# Patient Record
Sex: FEMALE | Race: WHITE | NOT HISPANIC OR LATINO | Employment: OTHER | URBAN - METROPOLITAN AREA
[De-identification: names, ages, dates, MRNs, and addresses within clinical notes are randomized per-mention and may not be internally consistent; named-entity substitution may affect disease eponyms.]

---

## 2017-02-06 NOTE — PROGRESS NOTES
SUBJECTIVE:                                                    Becky Lay is a 56 year old female who presents to clinic today for the following health issues    Gastrointestinal symptoms      Duration: one month on and off    Description:           Nausea only    Intensity:  mild    Accompanying signs and symptoms:  none    History  Previous {similar problem: no   Previous evaluation:  none    Aggravating factors: none    Alleviating factors: antacids x 2 only-helped but doesn't remember how long    Other Therapies tried: None     She notes about 2 mo ago, she had tooth pain and she has a hx of clenching. She started using NSAIDs and splint, and these symptoms improved, but she had worsening nausea. She saw her endodontist and she was dx'ed with infection and suggested tooth removal and abx. She had worsening nausea after 2-3 days and her endodontist switched to azithromycin. Tooth was then extracted. Since then, she noes her nausea would come and go and she had a follow up appt with endodontist yesterday who said her tooth and gums are healing nicely and didn't think it was related.     She notes her appetitive has changed, she stopped drinking coffee, chocolate or alcohol. Her symptoms are only nausea, no vomiting diarrhea or constipation or abdominal pain of any kind. No changes in diet. She does have mild dizziness when she has this. No hx of allergies.     Wt Readings from Last 4 Encounters:   07/27/16 125 lb 12.8 oz (57.063 kg)   07/19/16 125 lb 12.8 oz (57.063 kg)   12/14/15 130 lb (58.968 kg)   03/07/15 130 lb (58.968 kg)     -------------------------------------    Problem list and histories reviewed & adjusted, as indicated.  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, 6, 7 DJD     Other and unspecified malignant neoplasm of skin of other and unspecified parts of face     Generalized hyperhidrosis     CARDIOVASCULAR SCREENING; LDL GOAL LESS THAN 160      Menopausal syndrome (hot flashes)     Cervicalgia     Past Surgical History   Procedure Laterality Date     C arvizu w/o facetec foramot/dskc 1/2 vrt seg, cervical  2004     anterior c- 5, 6 fusion       Social History   Substance Use Topics     Smoking status: Never Smoker      Smokeless tobacco: Never Used     Alcohol Use: 6.0 oz/week      Comment: one glass per week     Family History   Problem Relation Age of Onset     OSTEOPOROSIS Mother      Lipids Mother      HEART DISEASE Father      MI age 50     HEART DISEASE Father      BP and Chol Meds     CANCER Maternal Grandmother      colon     CANCER Maternal Grandfather      lung ca           ROS:  Constitutional, HEENT, cardiovascular, pulmonary, GI, , musculoskeletal, neuro, skin, endocrine and psych systems are negative, except as otherwise noted.    OBJECTIVE:                                                    BP 98/76 mmHg  Pulse 72  Temp(Src) 98.2  F (36.8  C) (Tympanic)  Resp 16  Wt   LMP 06/20/2008  There is no weight on file to calculate BMI.  GENERAL: healthy, alert and no distress  EYES: Eyes grossly normal to inspection, PERRL and conjunctivae and sclerae normal  HENT: ear canals and TM's normal, nose and mouth without ulcers or lesions  NECK: no adenopathy, no asymmetry, masses, or scars and thyroid normal to palpation  RESP: lungs clear to auscultation - no rales, rhonchi or wheezes  CV: regular rate and rhythm, normal S1 S2, no S3 or S4, no murmur, click or rub, no peripheral edema and peripheral pulses strong  SKIN: no suspicious lesions or rashes  PSYCH: mentation appears normal, affect normal/bright         ASSESSMENT/PLAN:                                                      1. Nausea  We reviewed her symptoms in depth today. Her nausea is not accompanied with any other abd pain or symptoms, indicative of GI type phenomenon including h pylori, infection, GERD. She has had minimal dizziness, no fluid observed behind ears today. She does admit  "anxiety could be a factor, stating \"tooth problems\" is a particular fear for her. Will draw labs, could try atarax and monitor. She notes since hearing from her oral surgeon that htings look fine yesterday, she does feel better. These symptoms could resolve on their own, however if she does not see this improvement, she should follow up with me in clinic    - TSH with free T4 reflex  - CBC with platelets differential  - Comprehensive metabolic panel  - Vitamin B6  - Vitamin B12  - Vitamin D Deficiency  - hydrOXYzine (ATARAX) 25 MG tablet; Take 1-2 tablets (25-50 mg) by mouth every 6 hours as needed for anxiety or other  Dispense: 30 tablet; Refill: 0    Patient Instructions   We'll draw labs today to rule out some other potential diagnoses. It may go away with time. You could try atarax when you get this symptoms. Keep an eye on when you've eaten last when you get these symptoms.         NANCY Soriano [facility] [place]    "

## 2017-02-07 ENCOUNTER — OFFICE VISIT (OUTPATIENT)
Dept: PEDIATRICS | Facility: CLINIC | Age: 57
End: 2017-02-07
Payer: COMMERCIAL

## 2017-02-07 VITALS
HEART RATE: 72 BPM | SYSTOLIC BLOOD PRESSURE: 98 MMHG | RESPIRATION RATE: 16 BRPM | DIASTOLIC BLOOD PRESSURE: 76 MMHG | TEMPERATURE: 98.2 F

## 2017-02-07 DIAGNOSIS — R11.0 NAUSEA: Primary | ICD-10-CM

## 2017-02-07 LAB
BASOPHILS # BLD AUTO: 0 10E9/L (ref 0–0.2)
BASOPHILS NFR BLD AUTO: 0.9 %
DIFFERENTIAL METHOD BLD: NORMAL
EOSINOPHIL # BLD AUTO: 0.1 10E9/L (ref 0–0.7)
EOSINOPHIL NFR BLD AUTO: 2 %
ERYTHROCYTE [DISTWIDTH] IN BLOOD BY AUTOMATED COUNT: 12.5 % (ref 10–15)
HCT VFR BLD AUTO: 36.9 % (ref 35–47)
HGB BLD-MCNC: 12 G/DL (ref 11.7–15.7)
LYMPHOCYTES # BLD AUTO: 1.3 10E9/L (ref 0.8–5.3)
LYMPHOCYTES NFR BLD AUTO: 29.5 %
MCH RBC QN AUTO: 28.1 PG (ref 26.5–33)
MCHC RBC AUTO-ENTMCNC: 32.5 G/DL (ref 31.5–36.5)
MCV RBC AUTO: 86 FL (ref 78–100)
MONOCYTES # BLD AUTO: 0.3 10E9/L (ref 0–1.3)
MONOCYTES NFR BLD AUTO: 7.5 %
NEUTROPHILS # BLD AUTO: 2.6 10E9/L (ref 1.6–8.3)
NEUTROPHILS NFR BLD AUTO: 60.1 %
PLATELET # BLD AUTO: 187 10E9/L (ref 150–450)
RBC # BLD AUTO: 4.27 10E12/L (ref 3.8–5.2)
VIT B12 SERPL-MCNC: 464 PG/ML (ref 193–986)
WBC # BLD AUTO: 4.4 10E9/L (ref 4–11)

## 2017-02-07 PROCEDURE — 99000 SPECIMEN HANDLING OFFICE-LAB: CPT | Performed by: NURSE PRACTITIONER

## 2017-02-07 PROCEDURE — 82607 VITAMIN B-12: CPT | Performed by: NURSE PRACTITIONER

## 2017-02-07 PROCEDURE — 82306 VITAMIN D 25 HYDROXY: CPT | Performed by: NURSE PRACTITIONER

## 2017-02-07 PROCEDURE — 80050 GENERAL HEALTH PANEL: CPT | Performed by: NURSE PRACTITIONER

## 2017-02-07 PROCEDURE — 84207 ASSAY OF VITAMIN B-6: CPT | Mod: 90 | Performed by: NURSE PRACTITIONER

## 2017-02-07 PROCEDURE — 99214 OFFICE O/P EST MOD 30 MIN: CPT | Performed by: NURSE PRACTITIONER

## 2017-02-07 PROCEDURE — 36415 COLL VENOUS BLD VENIPUNCTURE: CPT | Performed by: NURSE PRACTITIONER

## 2017-02-07 RX ORDER — HYDROXYZINE HYDROCHLORIDE 25 MG/1
25-50 TABLET, FILM COATED ORAL EVERY 6 HOURS PRN
Qty: 30 TABLET | Refills: 0 | Status: SHIPPED | OUTPATIENT
Start: 2017-02-07 | End: 2018-03-16

## 2017-02-07 NOTE — NURSING NOTE
"Chief Complaint   Patient presents with     Nausea       Initial BP 98/76 mmHg  Pulse 72  Temp(Src) 98.2  F (36.8  C) (Tympanic)  Resp 16  Wt   LMP 06/20/2008 Estimated body mass index is 20.93 kg/(m^2) as calculated from the following:    Height as of 7/27/16: 5' 5\" (1.651 m).    Weight as of 7/27/16: 125 lb 12.8 oz (57.063 kg).  Medication Reconciliation: complete    "

## 2017-02-07 NOTE — PATIENT INSTRUCTIONS
We'll draw labs today to rule out some other potential diagnoses. It may go away with time. You could try atarax when you get this symptoms. Keep an eye on when you've eaten last when you get these symptoms.

## 2017-02-07 NOTE — MR AVS SNAPSHOT
After Visit Summary   2/7/2017    Becky Lay    MRN: [id number]           Patient Information     Date Of Birth          1960        Visit Information        Provider Department      2/7/2017 9:45 AM Paty Thomson APRN CNP [facility]        Today's Diagnoses     Nausea    -  1       Care Instructions    We'll draw labs today to rule out some other potential diagnoses. It may go away with time. You could try atarax when you get this symptoms. Keep an eye on when you've eaten last when you get these symptoms.         Follow-ups after your visit        Who to contact     If you have questions or need follow up information about today's clinic visit or your schedule please contact [facility] directly at [phone].  Normal or non-critical lab and imaging results will be communicated to you by MyChart, letter or phone within 4 business days after the clinic has received the results. If you do not hear from us within 7 days, please contact the clinic through MyChart or phone. If you have a critical or abnormal lab result, we will notify you by phone as soon as possible.  Submit refill requests through [employer] or call your pharmacy and they will forward the refill request to us. Please allow 3 business days for your refill to be completed.          Additional Information About Your Visit        MyChart Information     [employer] gives you secure access to your electronic health record. If you see a primary care provider, you can also send messages to your care team and make appointments. If you have questions, please call your primary care clinic.  If you do not have a primary care provider, please call [phone] and they will assist you.        Care EveryWhere ID     This is your Care EveryWhere ID. This could be used by other organizations to access your [place] medical records  [id number]        Your Vitals Were     Pulse Temperature Respirations Last  Period          72 98.2  F (36.8  C) (Tympanic) 16 06/20/2008         Blood Pressure from Last 3 Encounters:   02/07/17 98/76   07/27/16 106/72   07/19/16 94/60    Weight from Last 3 Encounters:   07/27/16 125 lb 12.8 oz (57.063 kg)   07/19/16 125 lb 12.8 oz (57.063 kg)   12/14/15 130 lb (58.968 kg)              We Performed the Following     CBC with platelets differential     Comprehensive metabolic panel     TSH with free T4 reflex     Vitamin B12     Vitamin B6     Vitamin D Deficiency          Today's Medication Changes          These changes are accurate as of: 2/7/17 10:22 AM.  If you have any questions, ask your nurse or doctor.               Start taking these medicines.        Dose/Directions    hydrOXYzine 25 MG tablet   Commonly known as:  ATARAX   Used for:  Nausea   Started by:  Paty Thomson APRN CNP        Dose:  25-50 mg   Take 1-2 tablets (25-50 mg) by mouth every 6 hours as needed for anxiety or other   Quantity:  30 tablet   Refills:  0            Where to get your medicines      These medications were sent to [id number] - [place] - [address] AT [facility]  [address] [address]     Phone:  [phone]    - hydrOXYzine 25 MG tablet             Primary Care Provider Office Phone # Fax #    NANCY Ramsay [phone]       [address] [place] [address]        Thank you!     Thank you for choosing [facility]  for your care. Our goal is always to provide you with excellent care. Hearing back from our patients is one way we can continue to improve our services. Please take a few minutes to complete the written survey that you may receive in the mail after your visit with us. Thank you!             Your Updated Medication List - Protect others around you: Learn how to safely use, store and throw away your medicines at www.disposemymeds.org.          This list is accurate as  of: 2/7/17 10:22 AM.  Always use your most recent med list.                   Brand Name Dispense Instructions for use    CITRACAL/VITAMIN D PO      2 a day       cyclobenzaprine 10 MG tablet    FLEXERIL    90 tablet    Take 1 tablet (10 mg) by mouth 2 times daily as needed for muscle spasms       hydrOXYzine 25 MG tablet    ATARAX    30 tablet    Take 1-2 tablets (25-50 mg) by mouth every 6 hours as needed for anxiety or other

## 2017-02-08 ENCOUNTER — HOSPITAL ENCOUNTER (OUTPATIENT)
Dept: MAMMOGRAPHY | Facility: CLINIC | Age: 57
Discharge: HOME | End: 2017-02-08
Attending: NURSE PRACTITIONER | Admitting: NURSE PRACTITIONER
Payer: COMMERCIAL

## 2017-02-08 DIAGNOSIS — Z00.00 ROUTINE GENERAL MEDICAL EXAMINATION AT A HEALTH CARE FACILITY: ICD-10-CM

## 2017-02-08 LAB
ALBUMIN SERPL-MCNC: 4.4 G/DL (ref 3.4–5)
ALP SERPL-CCNC: 55 U/L (ref 40–150)
ALT SERPL W P-5'-P-CCNC: 33 U/L (ref 0–50)
ANION GAP SERPL CALCULATED.3IONS-SCNC: 7 MMOL/L (ref 3–14)
AST SERPL W P-5'-P-CCNC: 18 U/L (ref 0–45)
BILIRUB SERPL-MCNC: 0.5 MG/DL (ref 0.2–1.3)
BUN SERPL-MCNC: 11 MG/DL (ref 7–30)
CALCIUM SERPL-MCNC: 8.9 MG/DL (ref 8.5–10.1)
CHLORIDE SERPL-SCNC: 105 MMOL/L (ref 94–109)
CO2 SERPL-SCNC: 27 MMOL/L (ref 20–32)
CREAT SERPL-MCNC: 0.78 MG/DL (ref 0.52–1.04)
DEPRECATED CALCIDIOL+CALCIFEROL SERPL-MC: 27 UG/L (ref 20–75)
GFR SERPL CREATININE-BSD FRML MDRD: 75 ML/MIN/1.7M2
GLUCOSE SERPL-MCNC: 82 MG/DL (ref 70–99)
POTASSIUM SERPL-SCNC: 4 MMOL/L (ref 3.4–5.3)
PROT SERPL-MCNC: 7.1 G/DL (ref 6.8–8.8)
SODIUM SERPL-SCNC: 139 MMOL/L (ref 133–144)
TSH SERPL DL<=0.005 MIU/L-ACNC: 1.8 MU/L (ref 0.4–4)

## 2017-02-08 PROCEDURE — G0202 SCR MAMMO BI INCL CAD: HCPCS

## 2017-02-11 LAB — VIT B6 SERPL-MCNC: 120.4 NG/ML

## 2018-03-16 ENCOUNTER — OFFICE VISIT (OUTPATIENT)
Dept: PEDIATRICS | Facility: CLINIC | Age: 58
End: 2018-03-16
Payer: COMMERCIAL

## 2018-03-16 VITALS
HEIGHT: 65 IN | HEART RATE: 61 BPM | OXYGEN SATURATION: 98 % | TEMPERATURE: 97.3 F | DIASTOLIC BLOOD PRESSURE: 64 MMHG | SYSTOLIC BLOOD PRESSURE: 96 MMHG | WEIGHT: 131.2 LBS | BODY MASS INDEX: 21.86 KG/M2

## 2018-03-16 DIAGNOSIS — M54.50 ACUTE BILATERAL LOW BACK PAIN WITHOUT SCIATICA: Primary | ICD-10-CM

## 2018-03-16 PROCEDURE — 99213 OFFICE O/P EST LOW 20 MIN: CPT | Performed by: INTERNAL MEDICINE

## 2018-03-16 RX ORDER — CYCLOBENZAPRINE HCL 10 MG
10 TABLET ORAL 3 TIMES DAILY PRN
Qty: 45 TABLET | Refills: 1 | Status: SHIPPED | OUTPATIENT
Start: 2018-03-16 | End: 2019-09-17

## 2018-03-16 NOTE — MR AVS SNAPSHOT
After Visit Summary   3/16/2018    Becky Lay    MRN: [id number]           Patient Information     Date Of Birth          1960        Visit Information        Provider Department      3/16/2018 9:40 AM Lisa Guajardo MD [facility]        Today's Diagnoses     Acute bilateral low back pain without sciatica    -  1      Care Instructions    1. Ibuprofen 600 mg (3 pills) three times a day for 5-7 days, then as needed - take with food  (could take up to 800 mg three times a day)  2. Flexeril at bedtime as needed to help relax muscles  3. You will be contacted to set up the physical therapy  4. Stretches below             Low Back Pain            What is low back pain?   Low back pain is pain and stiffness in the lower back. It is one of the most common reasons people miss work.   How does it occur?   Your lower back is called your lumbar spine. It is made up of 5 bones called lumbar vertebrae. In between the vertebrae are shock absorbers called disks. Back pain can occur from an injury to the vertebrae or when a disk bulges or herniates.   Low back pain is usually caused when a ligament or muscle holding a vertebra in its proper position is strained. Vertebrae are bones that make up the spinal column through which the spinal cord passes. When these muscles or ligaments become weak or strained, the spine loses its stability, resulting in pain.   Low back pain can occur if your job involves lifting and carrying heavy objects, or if you spend a lot of time sitting or standing in one position or bending over. It can be caused by a fall or by unusually strenuous exercise. It can be brought on by the tension and stress that cause headaches in some people. It can even be brought on by violent sneezing or coughing.   People who are overweight may have low back pain because of the added stress on their back.   Back pain may occur when the muscles, joints, bones, and connective  tissues of the back become inflamed as a result of an infection or an immune system problem. Arthritic disorders as well as some congenital and degenerative conditions may cause back pain.   Back pain accompanied by loss of bladder or bowel control, trouble moving your legs, or numbness or tingling in your arms or legs requires immediate medical treatment.   What are the symptoms?   Symptoms include:   pain in the back or legs   stiffness, spasm, or limited motion   The pain may be constant or may happen only in certain positions. It may get worse when you cough, sneeze, bend, twist, or strain during a bowel movement. The pain may be in only one spot or may spread to other areas, most commonly down the buttocks and into the back of the thigh.   A low back strain typically does not produce pain past the knee into the calf or foot. Tingling or numbness in the calf or foot may indicate a herniated disk or pinched nerve.   Be sure to see your healthcare provider if:   You have weakness in your leg, especially if you cannot lift your foot, because this may be a sign of nerve damage.   You have new bowel or bladder problems as well as back pain, which may be a sign of severe injury to your spinal cord.   You have pain that gets worse despite treatment.   How is it diagnosed?   Your healthcare provider will review your medical history and examine you. You may have X-rays, an MRI, CT scan, or a bone scan.   How is it treated?   To treat this condition:   Put an ice pack, gel pack, or package of frozen vegetables, wrapped in a cloth on the area every 3 to 4 hours, for up to 20 minutes at a time for the first 2 or 3 days.   Use a heating pad or hot water bottle. Don't let the heating pad get too hot, and don't fall asleep with it. You could get a burn.   Rest in bed on a firm mattress. Often it helps to lie on your back with your knees raised on a pillow. However, some people prefer to lie on their side with their knees bent.  It's best to try to stay active, so try not to rest in bed longer than 1 to 2 days.   Take muscle relaxants as recommended by your healthcare provider.   Take an anti-inflammatory such as ibuprofen, or other medicine as directed by your provider. Nonsteroidal anti-inflammatory medicines (NSAIDs) may cause stomach bleeding and other problems. These risks increase with age. Read the label and take as directed. Unless recommended by your healthcare provider, do not take for more than 10 days.   Get a back massage by a trained person.   Wear a belt or corset to support your back.   Do the exercises recommended by your provider. Your provider may also prescribe physical therapy.   Talk with a counselor, if your back pain is related to tension caused by emotional problems.   When the pain is gone, ask your healthcare provider about starting an exercise program such as the following:   Exercise moderately every day, using stretching and warm-up exercises suggested by your provider or physical therapist.   Exercise vigorously for about 30 minutes 3 times a week by walking, swimming, using a stationary bicycle, or doing low-impact aerobics.   Exercising regularly will not only help your back, it will also help keep you healthier overall.   How long will the effects last?   The effects of back pain last as long as the cause exists or until your body recovers from the strain, usually a day or two but sometimes weeks.   How can I take care of myself?   In addition to the treatment described above, keep in mind these suggestions:   Practice good posture. Stand with your head up, shoulders straight, chest forward, weight balanced evenly on both feet, and pelvis tucked in.   Lose weight if you are overweight   Keep your core muscles strong. These are your abdominal and back muscles.   Sleep without a pillow under your head.   Pain is the best way to  the pace you should set in increasing your activity and exercise. Minor  discomfort, stiffness, soreness, and mild aches need not interfere with activity. However, limit your activities temporarily if:   Your symptoms return.   The pain increases when you are more active.   The pain increases within 24 hours after a new or higher level of activity.   When can I return to my normal activities?   Everyone recovers from an injury at a different rate. Return to your activities depends on how soon your back recovers, not by how many days or weeks it has been since your injury has occurred. In general, the longer you have symptoms before you start treatment, the longer it will take to get better. The goal is to return to your normal activities as soon as is safely possible. If you return too soon you may worsen your injury.   It is important that you have fully recovered from your low back pain before you return to any strenuous activity. You must be able to have the same range of motion that you had before your injury. You must be able to walk and twist without pain.   What can I do to help prevent low back pain?   You can reduce the strain on your back by doing the following:   Don't push with your arms when you move a heavy object. Turn around and push backwards so the strain is taken by your legs.   Whenever you sit, sit in a straight-backed chair and hold your spine against the back of the chair.   Bend your knees and hips and keep your back straight when you lift a heavy object.   Avoid lifting heavy objects higher than your waist.   Hold packages you carry close to your body, with your arms bent.   Use a footrest for one foot when you stand or sit in one spot for a long time. This keeps your back straight.   Bend your knees when you bend over.   Sit close to the pedals when you drive and use your seat belt and a hard backrest or pillow.   Lie on your side with your knees bent when you sleep or rest. It may help to put a pillow between your knees.   Put a pillow under your knees when you  sleep on your back.   Raise the foot of the bed 8 inches to discourage sleeping on your stomach unless you have other problems that require that you keep your head elevated.   To rest your back, hold each of these positions for 5?minutes or longer:   Lie on your back, bend your knees, and put pillows under your knees.   Lie on your back on the floor with a pillow under your neck. Bend your knees to a 90-degree angle, and put your lower legs and feet on a chair.   Lie on your back, bend your knees, and bring one knee up to your chest and hold it there. Repeat with the other knee, then bring both knees to your chest. When holding your knee to your chest, grab your thigh rather than your lower leg to avoid over flexing your knee.     Published by [employer].  This content is reviewed periodically and is subject to change as new health information becomes available. The information is intended to inform and educate and is not a replacement for medical evaluation, advice, diagnosis or treatment by a healthcare professional.   Developed by Marian Bernal RN, [place], and [facility].   ? 2010 [facility] [place] and/or its affiliates. All Rights Reserved.           Low Back Pain Exercise          Standing hamstring stretch: Put the heel of one leg on a stool about 15 inches high. Keep your leg straight. Lean forward, bending at the hips until you feel a mild stretch in the back of your thigh. Make sure you do not roll your shoulders or bend at the waist when doing this. You want to stretch your leg, not your lower back. Hold the stretch for 15 to 30 seconds. Repeat with each leg 3 times.   Cat and camel: Get down on your hands and knees. Let your stomach sag, allowing your back to curve downward. Hold this position for 5 seconds. Then arch your back and hold for 5 seconds. Do 3 sets of 10.   Quadruped arm and leg raise: Get down on your hands and knees. Pull in your belly button and tighten your abdominal muscles to stiffen your  spine. While keeping your abdominals tight, raise one arm and the opposite leg away from you. Hold this position for 5 seconds. Lower your arm and leg slowly and change sides. Do this 10 times on each side.   Pelvic tilt: Lie on your back with your knees bent and your feet flat on the floor. Tighten your abdominal muscles and push your lower back into the floor. Hold this position for 5 seconds, then relax. Do 3 sets of 10.   Partial curl: Lie on your back with your knees bent and your feet flat on the floor. Tighten your stomach muscles. Tuck your chin to your chest. With your hands stretched out in front of you, curl your upper body forward until your shoulders clear the floor. Hold this position for 3 seconds. Don't hold your breath. It helps to breathe out as you lift your shoulders up. Relax back to the floor. Repeat 10 times. Build to 3 sets of 10. To challenge yourself, clasp your hands behind your head and keep your elbows out to the side.   Gluteal stretch: Lie on your back with both knees bent. Rest the ankle of one leg over the knee of your other leg. Grasp the thigh of the bottom leg and pull toward your chest. You will feel a stretch along the buttocks and possibly along the outside of your hip. Hold the stretch for 15 to 30 seconds. Repeat 3 times with each leg.   Extension exercise:   0. Lie face down on the floor for 5 minutes. If this hurts too much, lie face down with a pillow under your stomach. This should relieve your leg or back pain. When you can lie on your stomach for 5 minutes without a pillow, you can continue with Part B of this exercise.   0. After lying on your stomach for 5 minutes, prop yourself up on your elbows for another 5 minutes. If you can do this without having more leg or buttock pain, you can start doing part C of this exercise.   0. Lie on your stomach with your hands under your shoulders. Then press down on your hands and extend your elbows while keeping your hips flat on  the floor. Hold for 1 second and lower yourself to the floor. Do 3 to 5 sets of 10 repetitions. Rest for 1 minute between sets. You should have no pain in your legs when you do this, but it is normal to feel some pain in your lower back.   Do this exercise several times a day.   Side plank: Lie on your side with your legs, hips, and shoulders in a straight line. Prop yourself up onto your forearm so your elbow is directly under your shoulder. Lift your hips off the floor and balance on your forearm and the outside of your foot. Try to hold this position for 15 seconds, then slowly lower your hip to the ground. Switch sides and repeat. Work up to holding for 1 minute or longer. This exercise can be made easier by starting with your knees and hips flexed toward your chest.   Published by [employer].  This content is reviewed periodically and is subject to change as new health information becomes available. The information is intended to inform and educate and is not a replacement for medical evaluation, advice, diagnosis or treatment by a healthcare professional.   Written by Ana Lilia Faustin, MS, PT, and Marian Landa PT, [facility], [facility], for [facility]   ? 2010 [facility] and/or its affiliates. All Rights Reserved.         Copyright   [employer] 2011                      Follow-ups after your visit        Additional Services     [facility] PT, HAND, AND CHIROPRACTIC REFERRAL       === This order will print in the [facility] Scheduling  Office ===    Physical therapy, hand therapy and chiropractic care are available through:    [facility]  [facility]    Call one easy number to schedule at any of the above locations:  [phone].    Your provider has referred you to Physical Therapy at [facility] or [facility]    Indication/Reason for Referral: Low Back Pain  Onset of Illness:  5 days  Therapy Orders:  Evaluate and Treat  Special Programs:  None  Special Request:   "None    Additional Comments for the therapist or chiropractor:  none    Please be aware that coverage of these services is subject to the terms and limitations of your health insurance plan.  Call member services at your health plan with any benefit or coverage questions.      Please bring the following to your appointment:    >>   Your personal calendar for scheduling future appointments  >>   Comfortable clothing                  Who to contact     If you have questions or need follow up information about today's clinic visit or your schedule please contact [facility] directly at [phone].  Normal or non-critical lab and imaging results will be communicated to you by [email or web address], letter or phone within 4 business days after the clinic has received the results. If you do not hear from us within 7 days, please contact the clinic through [employer] or phone. If you have a critical or abnormal lab result, we will notify you by phone as soon as possible.  Submit refill requests through [employer] or call your pharmacy and they will forward the refill request to us. Please allow 3 business days for your refill to be completed.          Additional Information About Your Visit        [employer] Information     [employer] gives you secure access to your electronic health record. If you see a primary care provider, you can also send messages to your care team and make appointments. If you have questions, please call your primary care clinic.  If you do not have a primary care provider, please call [phone] and they will assist you.        Care EveryWhere ID     This is your Care EveryWhere ID. This could be used by other organizations to access your [place] medical records  [id number]        Your Vitals Were     Pulse Temperature Height Last Period Pulse Oximetry BMI (Body Mass Index)    61 97.3  F (36.3  C) (Tympanic) 5' 5\" (1.651 m) 06/20/2008 98% 21.83 kg/m2       Blood Pressure from Last 3 Encounters:   03/16/18 " 96/64   02/07/17 98/76   07/27/16 106/72    Weight from Last 3 Encounters:   03/16/18 131 lb 3.2 oz (59.5 kg)   07/27/16 125 lb 12.8 oz (57.1 kg)   07/19/16 125 lb 12.8 oz (57.1 kg)              We Performed the Following     [facility] PT, HAND, AND CHIROPRACTIC REFERRAL          Today's Medication Changes          These changes are accurate as of 3/16/18 10:13 AM.  If you have any questions, ask your nurse or doctor.               These medicines have changed or have updated prescriptions.        Dose/Directions    * cyclobenzaprine 10 MG tablet   Commonly known as:  FLEXERIL   This may have changed:  Another medication with the same name was added. Make sure you understand how and when to take each.   Used for:  Generalized osteoarthrosis, involving multiple sites, Cervicalgia   Changed by:  Lisa Guajardo MD        Dose:  10 mg   Take 1 tablet (10 mg) by mouth 2 times daily as needed for muscle spasms   Quantity:  90 tablet   Refills:  1       * cyclobenzaprine 10 MG tablet   Commonly known as:  FLEXERIL   This may have changed:  You were already taking a medication with the same name, and this prescription was added. Make sure you understand how and when to take each.   Used for:  Acute bilateral low back pain without sciatica   Changed by:  Lisa Guajardo MD        Dose:  10 mg   Take 1 tablet (10 mg) by mouth 3 times daily as needed for muscle spasms   Quantity:  45 tablet   Refills:  1       * Notice:  This list has 2 medication(s) that are the same as other medications prescribed for you. Read the directions carefully, and ask your doctor or other care provider to review them with you.         Where to get your medicines      These medications were sent to [facility] [id number] [facility]  [address] [address]     Phone:  [phone]     cyclobenzaprine 10 MG tablet                Primary Care Provider Office Phone # Fax #     Paty Thomson, APRN [phone]       [address] [place] [address]        Equal Access to Services     SWAPNA SAMUEL : Sonal sera roth marineo Soyoshi, wasarada luqadaha, qanydiata kaalmada jennifer, yvette diaz laocnlon rajesh. So [facility] [place] [phone].    ATENCIÓN: Si habla español, tiene a john disposición servicios gratuitos de asistencia lingüística. Llame al [phone].    We comply with applicable federal civil rights laws and [place] laws. We do not discriminate on the basis of race, color, national origin, age, disability, sex, sexual orientation, or gender identity.            Thank you!     Thank you for choosing [facility]  for your care. Our goal is always to provide you with excellent care. Hearing back from our patients is one way we can continue to improve our services. Please take a few minutes to complete the written survey that you may receive in the mail after your visit with us. Thank you!             Your Updated Medication List - Protect others around you: Learn how to safely use, store and throw away your medicines at www.disposemymeds.org.          This list is accurate as of 3/16/18 10:13 AM.  Always use your most recent med list.                   Brand Name Dispense Instructions for use Diagnosis    CITRACAL/VITAMIN D PO      2 a day        * cyclobenzaprine 10 MG tablet    FLEXERIL    90 tablet    Take 1 tablet (10 mg) by mouth 2 times daily as needed for muscle spasms    Generalized osteoarthrosis, involving multiple sites, Cervicalgia       * cyclobenzaprine 10 MG tablet    FLEXERIL    45 tablet    Take 1 tablet (10 mg) by mouth 3 times daily as needed for muscle spasms    Acute bilateral low back pain without sciatica       * Notice:  This list has 2 medication(s) that are the same as other medications prescribed for you. Read the directions carefully, and ask your doctor or other care provider to review them with you.

## 2018-03-16 NOTE — PROGRESS NOTES
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  History of back problems: previous osteoarthritis in neck and shoulders but not lumbar  Any previous MRI or X-rays: None  Sees a specialist for back pain:  No  Therapies tried without relief: none    Alleviating factors:   Improved by: cold, heat and NSAIDs      Precipitating factors:  Worsened by: Lifting, Bending, Standing and Lying Flat    Functional and Psychosocial Screen (Franco STarT Back):      Most recent score:    FRANCO START BACK TOTAL SCORE 3/16/2018   Total Score (all 9) 5       Accompanying Signs & Symptoms:  Risk of Fracture:  None  Risk of Cauda Equina:  None  Risk of Infection:  None  Risk of Cancer:  None  Risk of Ankylosing Spondylitis:  Onset at age <35, male, AND morning back stiffness. no     Low back pain started Monday night before went to bed. Worse on the right side. Pain has been constant. Worse with twisting. Able to bend forward ok. Best when standing. No pain in legs, numbness or weakness. Has tried ice and heat. Has been using some ibuprofen - was taking 400 mg. Last night took 800 mg. Takes a couple of times a day on average. Not sure that it helps. No particular injury. 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 Has history of cervical arthritis and radiculopathy for which she has done PT, flexeril, NSAIDs and prednisone  "in the past. Never has had low back pain. Was looking online and became concerned when she saw tumor on the DDx.    Reviewed and updated as needed this visit by clinical staff  Tobacco  Allergies  Meds  Problems  Med Hx  Surg Hx  Fam Hx  Soc Hx        Reviewed and updated as needed this visit by Provider  Allergies  Meds  Problems       -------------------------------------    ROS:  Constitutional, HEENT, cardiovascular, pulmonary, gi and gu systems are negative, except as otherwise noted.    OBJECTIVE:                                                    BP 96/64 (BP Location: Right arm, Patient Position: Sitting, Cuff Size: Adult Regular)  Pulse 61  Temp 97.3  F (36.3  C) (Tympanic)  Ht 5' 5\" (1.651 m)  Wt 131 lb 3.2 oz (59.5 kg)  LMP 06/20/2008  SpO2 98%  BMI 21.83 kg/m2   Body mass index is 21.83 kg/(m^2).  General Appearance: healthy, alert and no distress  Eyes:   no discharge, erythema.  Normal pupils.  Musculoskeletal: normal flexion of lower back. Some decreased extension. No pain to palpation. Points to right mid-lumbar paraspinal muscles as area of pain. Feels somewhat tighter in this area. Negative straight leg raise.  Skin: no rashes or lesions.  Well perfused and normal turgor.  Neurological: normal strength in bilateral lower extremities    Diagnostic Test Results:  none      ASSESSMENT/PLAN:                                                      (M54.5) Acute bilateral low back pain without sciatica  (primary encounter diagnosis)  Comment: likely muscular, no red flag symptoms  Plan: cyclobenzaprine (FLEXERIL) 10 MG tablet, [facility]         PT, HAND, AND CHIROPRACTIC REFERRAL  - reassurance given that tumor unlikely with symptoms  - will start with conservative treatment  - ibuprofen 600 mg TID for 5-7 days, then as needed - take with food  - flexeril at night as needed  - ice or heat to area  - referral to PT  - discussed prednisone unlikely to be helpful as does not seem to have a radicular " component    Follow up with Provider - if worsening or not improving     [facility]

## 2018-03-16 NOTE — PATIENT INSTRUCTIONS
1. Ibuprofen 600 mg (3 pills) three times a day for 5-7 days, then as needed - take with food  (could take up to 800 mg three times a day)  2. Flexeril at bedtime as needed to help relax muscles  3. You will be contacted to set up the physical therapy  4. Stretches below             Low Back Pain            What is low back pain?   Low back pain is pain and stiffness in the lower back. It is one of the most common reasons people miss work.   How does it occur?   Your lower back is called your lumbar spine. It is made up of 5 bones called lumbar vertebrae. In between the vertebrae are shock absorbers called disks. Back pain can occur from an injury to the vertebrae or when a disk bulges or herniates.   Low back pain is usually caused when a ligament or muscle holding a vertebra in its proper position is strained. Vertebrae are bones that make up the spinal column through which the spinal cord passes. When these muscles or ligaments become weak or strained, the spine loses its stability, resulting in pain.   Low back pain can occur if your job involves lifting and carrying heavy objects, or if you spend a lot of time sitting or standing in one position or bending over. It can be caused by a fall or by unusually strenuous exercise. It can be brought on by the tension and stress that cause headaches in some people. It can even be brought on by violent sneezing or coughing.   People who are overweight may have low back pain because of the added stress on their back.   Back pain may occur when the muscles, joints, bones, and connective tissues of the back become inflamed as a result of an infection or an immune system problem. Arthritic disorders as well as some congenital and degenerative conditions may cause back pain.   Back pain accompanied by loss of bladder or bowel control, trouble moving your legs, or numbness or tingling in your arms or legs requires immediate medical treatment.   What are the symptoms?    Symptoms include:   pain in the back or legs   stiffness, spasm, or limited motion   The pain may be constant or may happen only in certain positions. It may get worse when you cough, sneeze, bend, twist, or strain during a bowel movement. The pain may be in only one spot or may spread to other areas, most commonly down the buttocks and into the back of the thigh.   A low back strain typically does not produce pain past the knee into the calf or foot. Tingling or numbness in the calf or foot may indicate a herniated disk or pinched nerve.   Be sure to see your healthcare provider if:   You have weakness in your leg, especially if you cannot lift your foot, because this may be a sign of nerve damage.   You have new bowel or bladder problems as well as back pain, which may be a sign of severe injury to your spinal cord.   You have pain that gets worse despite treatment.   How is it diagnosed?   Your healthcare provider will review your medical history and examine you. You may have X-rays, an MRI, CT scan, or a bone scan.   How is it treated?   To treat this condition:   Put an ice pack, gel pack, or package of frozen vegetables, wrapped in a cloth on the area every 3 to 4 hours, for up to 20 minutes at a time for the first 2 or 3 days.   Use a heating pad or hot water bottle. Don't let the heating pad get too hot, and don't fall asleep with it. You could get a burn.   Rest in bed on a firm mattress. Often it helps to lie on your back with your knees raised on a pillow. However, some people prefer to lie on their side with their knees bent. It's best to try to stay active, so try not to rest in bed longer than 1 to 2 days.   Take muscle relaxants as recommended by your healthcare provider.   Take an anti-inflammatory such as ibuprofen, or other medicine as directed by your provider. Nonsteroidal anti-inflammatory medicines (NSAIDs) may cause stomach bleeding and other problems. These risks increase with age. Read the  label and take as directed. Unless recommended by your healthcare provider, do not take for more than 10 days.   Get a back massage by a trained person.   Wear a belt or corset to support your back.   Do the exercises recommended by your provider. Your provider may also prescribe physical therapy.   Talk with a counselor, if your back pain is related to tension caused by emotional problems.   When the pain is gone, ask your healthcare provider about starting an exercise program such as the following:   Exercise moderately every day, using stretching and warm-up exercises suggested by your provider or physical therapist.   Exercise vigorously for about 30 minutes 3 times a week by walking, swimming, using a stationary bicycle, or doing low-impact aerobics.   Exercising regularly will not only help your back, it will also help keep you healthier overall.   How long will the effects last?   The effects of back pain last as long as the cause exists or until your body recovers from the strain, usually a day or two but sometimes weeks.   How can I take care of myself?   In addition to the treatment described above, keep in mind these suggestions:   Practice good posture. Stand with your head up, shoulders straight, chest forward, weight balanced evenly on both feet, and pelvis tucked in.   Lose weight if you are overweight   Keep your core muscles strong. These are your abdominal and back muscles.   Sleep without a pillow under your head.   Pain is the best way to  the pace you should set in increasing your activity and exercise. Minor discomfort, stiffness, soreness, and mild aches need not interfere with activity. However, limit your activities temporarily if:   Your symptoms return.   The pain increases when you are more active.   The pain increases within 24 hours after a new or higher level of activity.   When can I return to my normal activities?   Everyone recovers from an injury at a different rate. Return to  your activities depends on how soon your back recovers, not by how many days or weeks it has been since your injury has occurred. In general, the longer you have symptoms before you start treatment, the longer it will take to get better. The goal is to return to your normal activities as soon as is safely possible. If you return too soon you may worsen your injury.   It is important that you have fully recovered from your low back pain before you return to any strenuous activity. You must be able to have the same range of motion that you had before your injury. You must be able to walk and twist without pain.   What can I do to help prevent low back pain?   You can reduce the strain on your back by doing the following:   Don't push with your arms when you move a heavy object. Turn around and push backwards so the strain is taken by your legs.   Whenever you sit, sit in a straight-backed chair and hold your spine against the back of the chair.   Bend your knees and hips and keep your back straight when you lift a heavy object.   Avoid lifting heavy objects higher than your waist.   Hold packages you carry close to your body, with your arms bent.   Use a footrest for one foot when you stand or sit in one spot for a long time. This keeps your back straight.   Bend your knees when you bend over.   Sit close to the pedals when you drive and use your seat belt and a hard backrest or pillow.   Lie on your side with your knees bent when you sleep or rest. It may help to put a pillow between your knees.   Put a pillow under your knees when you sleep on your back.   Raise the foot of the bed 8 inches to discourage sleeping on your stomach unless you have other problems that require that you keep your head elevated.   To rest your back, hold each of these positions for 5?minutes or longer:   Lie on your back, bend your knees, and put pillows under your knees.   Lie on your back on the floor with a pillow under your neck. Bend  your knees to a 90-degree angle, and put your lower legs and feet on a chair.   Lie on your back, bend your knees, and bring one knee up to your chest and hold it there. Repeat with the other knee, then bring both knees to your chest. When holding your knee to your chest, grab your thigh rather than your lower leg to avoid over flexing your knee.     Published by [employer].  This content is reviewed periodically and is subject to change as new health information becomes available. The information is intended to inform and educate and is not a replacement for medical evaluation, advice, diagnosis or treatment by a healthcare professional.   Developed by Marian Bernal RN, [place], and [facility].   ? 2010 [facility] and/or its affiliates. All Rights Reserved.           Low Back Pain Exercise          Standing hamstring stretch: Put the heel of one leg on a stool about 15 inches high. Keep your leg straight. Lean forward, bending at the hips until you feel a mild stretch in the back of your thigh. Make sure you do not roll your shoulders or bend at the waist when doing this. You want to stretch your leg, not your lower back. Hold the stretch for 15 to 30 seconds. Repeat with each leg 3 times.   Cat and camel: Get down on your hands and knees. Let your stomach sag, allowing your back to curve downward. Hold this position for 5 seconds. Then arch your back and hold for 5 seconds. Do 3 sets of 10.   Quadruped arm and leg raise: Get down on your hands and knees. Pull in your belly button and tighten your abdominal muscles to stiffen your spine. While keeping your abdominals tight, raise one arm and the opposite leg away from you. Hold this position for 5 seconds. Lower your arm and leg slowly and change sides. Do this 10 times on each side.   Pelvic tilt: Lie on your back with your knees bent and your feet flat on the floor. Tighten your abdominal muscles and push your lower back into the floor. Hold this position for  5 seconds, then relax. Do 3 sets of 10.   Partial curl: Lie on your back with your knees bent and your feet flat on the floor. Tighten your stomach muscles. Tuck your chin to your chest. With your hands stretched out in front of you, curl your upper body forward until your shoulders clear the floor. Hold this position for 3 seconds. Don't hold your breath. It helps to breathe out as you lift your shoulders up. Relax back to the floor. Repeat 10 times. Build to 3 sets of 10. To challenge yourself, clasp your hands behind your head and keep your elbows out to the side.   Gluteal stretch: Lie on your back with both knees bent. Rest the ankle of one leg over the knee of your other leg. Grasp the thigh of the bottom leg and pull toward your chest. You will feel a stretch along the buttocks and possibly along the outside of your hip. Hold the stretch for 15 to 30 seconds. Repeat 3 times with each leg.   Extension exercise:   0. Lie face down on the floor for 5 minutes. If this hurts too much, lie face down with a pillow under your stomach. This should relieve your leg or back pain. When you can lie on your stomach for 5 minutes without a pillow, you can continue with Part B of this exercise.   0. After lying on your stomach for 5 minutes, prop yourself up on your elbows for another 5 minutes. If you can do this without having more leg or buttock pain, you can start doing part C of this exercise.   0. Lie on your stomach with your hands under your shoulders. Then press down on your hands and extend your elbows while keeping your hips flat on the floor. Hold for 1 second and lower yourself to the floor. Do 3 to 5 sets of 10 repetitions. Rest for 1 minute between sets. You should have no pain in your legs when you do this, but it is normal to feel some pain in your lower back.   Do this exercise several times a day.   Side plank: Lie on your side with your legs, hips, and shoulders in a straight line. Prop yourself up onto  your forearm so your elbow is directly under your shoulder. Lift your hips off the floor and balance on your forearm and the outside of your foot. Try to hold this position for 15 seconds, then slowly lower your hip to the ground. Switch sides and repeat. Work up to holding for 1 minute or longer. This exercise can be made easier by starting with your knees and hips flexed toward your chest.   Published by [employer].  This content is reviewed periodically and is subject to change as new health information becomes available. The information is intended to inform and educate and is not a replacement for medical evaluation, advice, diagnosis or treatment by a healthcare professional.   Written by Ana Lilia Faustin, MS, PT, and Marian Landa PT, [facility], [facility], for [facility]   ? 2010 [facility] and/or its affiliates. All Rights Reserved.         Copyright   [employer] 2011

## 2018-04-14 ENCOUNTER — HEALTH MAINTENANCE LETTER (OUTPATIENT)
Age: 58
End: 2018-04-14

## 2018-06-19 ENCOUNTER — HOSPITAL ENCOUNTER (OUTPATIENT)
Dept: MAMMOGRAPHY | Facility: CLINIC | Age: 58
Discharge: HOME | End: 2018-06-19
Attending: NURSE PRACTITIONER | Admitting: NURSE PRACTITIONER
Payer: COMMERCIAL

## 2018-06-19 DIAGNOSIS — Z12.39 BREAST CANCER SCREENING: ICD-10-CM

## 2018-06-19 PROCEDURE — 77067 SCR MAMMO BI INCL CAD: CPT

## 2018-06-29 ENCOUNTER — OFFICE VISIT (OUTPATIENT)
Dept: PEDIATRICS | Facility: CLINIC | Age: 58
End: 2018-06-29
Payer: COMMERCIAL

## 2018-06-29 ENCOUNTER — MYC MEDICAL ADVICE (OUTPATIENT)
Dept: PEDIATRICS | Facility: CLINIC | Age: 58
End: 2018-06-29

## 2018-06-29 VITALS
OXYGEN SATURATION: 98 % | WEIGHT: 130.3 LBS | HEIGHT: 65 IN | DIASTOLIC BLOOD PRESSURE: 76 MMHG | SYSTOLIC BLOOD PRESSURE: 110 MMHG | TEMPERATURE: 97.5 F | BODY MASS INDEX: 21.71 KG/M2 | HEART RATE: 68 BPM

## 2018-06-29 DIAGNOSIS — M54.2 CERVICALGIA: ICD-10-CM

## 2018-06-29 DIAGNOSIS — Z23 NEED FOR TDAP VACCINATION: ICD-10-CM

## 2018-06-29 DIAGNOSIS — Z00.00 ROUTINE GENERAL MEDICAL EXAMINATION AT A HEALTH CARE FACILITY: Primary | ICD-10-CM

## 2018-06-29 PROCEDURE — 99213 OFFICE O/P EST LOW 20 MIN: CPT | Mod: 25 | Performed by: NURSE PRACTITIONER

## 2018-06-29 PROCEDURE — 90471 IMMUNIZATION ADMIN: CPT | Performed by: NURSE PRACTITIONER

## 2018-06-29 PROCEDURE — 99396 PREV VISIT EST AGE 40-64: CPT | Performed by: NURSE PRACTITIONER

## 2018-06-29 PROCEDURE — 90715 TDAP VACCINE 7 YRS/> IM: CPT | Performed by: NURSE PRACTITIONER

## 2018-06-29 ASSESSMENT — ENCOUNTER SYMPTOMS
CHILLS: 0
CONSTIPATION: 0
ABDOMINAL PAIN: 0
COUGH: 0
EYE PAIN: 0
HEMATOCHEZIA: 0
HEMATURIA: 0
DIARRHEA: 0
DIZZINESS: 0

## 2018-06-29 NOTE — PROGRESS NOTES
SUBJECTIVE:   CC: Becky Lay is an 57 year old woman who presents for preventive health visit.     Physical   Annual:     Getting at least 3 servings of Calcium per day:  Yes    Bi-annual eye exam:  Yes    Dental care twice a year:  Yes    Sleep apnea or symptoms of sleep apnea:  None    Diet:  Regular (no restrictions)    Frequency of exercise:  4-5 days/week    Duration of exercise:  30-45 minutes    Taking medications regularly:  Yes    Medication side effects:  Not applicable    Additional concerns today:  No    Hx of neck pain. She uses flexeril. Has done PT in the past, uses exercises and NSAIDs and flexeril. Can trigger migraine HAs.       Today's PHQ-2 Score:   PHQ-2 ( 1999 Pf[employer]) 6/29/2018   Q1: Little interest or pleasure in doing things 0   Q2: Feeling down, depressed or hopeless 0   PHQ-2 Score 0   Q1: Little interest or pleasure in doing things Not at all   Q2: Feeling down, depressed or hopeless Not at all   PHQ-2 Score 0       Abuse: Current or Past(Physical, Sexual or Emotional)- No  Do you feel safe in your environment - Yes    Social History   Substance Use Topics     Smoking status: Never Smoker     Smokeless tobacco: Never Used     Alcohol use 6.0 oz/week      Comment: one glass per week     Alcohol Use 6/29/2018   If you drink alcohol do you typically have greater than 3 drinks per day OR greater than 7 drinks per week? No   No flowsheet data found.    Reviewed orders with patient.  Reviewed health maintenance and updated orders accordingly - Yes  Labs reviewed in [facility]    Patient over age 50, mutual decision to screen reflected in health maintenance.    Pertinent mammograms are reviewed under the imaging tab.  History of abnormal Pap smear: NO - age 30-65 PAP every 5 years with negative HPV co-testing recommended  PAP / HPV 7/23/2014 8/18/2010 7/11/2007   PAP NIL NIL NIL     Reviewed and updated as needed this visit by clinical staff  Tobacco  Allergies  Med Hx  Surg Hx  Fam Hx   "Soc Hx        Reviewed and updated as needed this visit by Provider            Review of Systems   Constitutional: Negative for chills.   HENT: Negative for congestion and ear pain.    Eyes: Negative for pain.   Respiratory: Negative for cough.    Cardiovascular: Negative for chest pain.   Gastrointestinal: Negative for abdominal pain, constipation, diarrhea and hematochezia.   Genitourinary: Negative for hematuria.   Neurological: Negative for dizziness.        OBJECTIVE:   /76 (BP Location: Right arm, Patient Position: Chair, Cuff Size: Adult Regular)  Pulse 68  Temp 97.5  F (36.4  C) (Tympanic)  Ht 5' 5\" (1.651 m)  Wt 130 lb 4.8 oz (59.1 kg)  LMP 06/20/2008  SpO2 98%  BMI 21.68 kg/m2  Physical Exam  GENERAL: healthy, alert and no distress  EYES: Eyes grossly normal to inspection, PERRL and conjunctivae and sclerae normal  HENT: ear canals and TM's normal, nose and mouth without ulcers or lesions  NECK: no adenopathy, no asymmetry, masses, or scars and thyroid normal to palpation  RESP: lungs clear to auscultation - no rales, rhonchi or wheezes  CV: regular rate and rhythm, normal S1 S2, no S3 or S4, no murmur, click or rub, no peripheral edema and peripheral pulses strong  MS: no gross musculoskeletal defects noted, no edema  SKIN: no suspicious lesions or rashes  PSYCH: mentation appears normal, affect normal/bright      ASSESSMENT/PLAN:   1. Routine general medical examination at a health care facility    - GASTROENTEROLOGY ADULT REF PROCEDURE ONLY    2. Need for Tdap vaccination    - TDAP VACCINE (ADACEL)  - ADMIN 1st VACCINE    3. Cervicalgia  Order placed      COUNSELING:  Reviewed preventive health counseling, as reflected in patient instructions  Special attention given to:        Regular exercise       Healthy diet/nutrition    BP Readings from Last 1 Encounters:   06/29/18 110/76     Estimated body mass index is 21.68 kg/(m^2) as calculated from the following:    Height as of this encounter: " "5' 5\" (1.651 m).    Weight as of this encounter: 130 lb 4.8 oz (59.1 kg).           reports that she has never smoked. She has never used smokeless tobacco.      Counseling Resources:  ATP IV Guidelines  Pooled Cohorts Equation Calculator  Breast Cancer Risk Calculator  FRAX Risk Assessment  ICSI Preventive Guidelines  Dietary Guidelines for Americans, 2010  USDA's MyPlate  ASA Prophylaxis  Lung CA Screening    Paty Mason, NANCY [facility] SANDIE  "

## 2018-06-29 NOTE — PATIENT INSTRUCTIONS
I will connect with you via [employer] about trigger point injections (we could also consider dry needling).       Preventive Health Recommendations  Female Ages 50 - 64    Yearly exam: See your health care provider every year in order to  o Review health changes.   o Discuss preventive care.    o Review your medicines if your doctor has prescribed any.      Get a Pap test every three years (unless you have an abnormal result and your provider advises testing more often).    If you get Pap tests with HPV test, you only need to test every 5 years, unless you have an abnormal result.     You do not need a Pap test if your uterus was removed (hysterectomy) and you have not had cancer.    You should be tested each year for STDs (sexually transmitted diseases) if you're at risk.     Have a mammogram every 1 to 2 years.    Have a colonoscopy at age 50, or have a yearly FIT test (stool test). These exams screen for colon cancer.      Have a cholesterol test every 5 years, or more often if advised.    Have a diabetes test (fasting glucose) every three years. If you are at risk for diabetes, you should have this test more often.     If you are at risk for osteoporosis (brittle bone disease), think about having a bone density scan (DEXA).    Shots: Get a flu shot each year. Get a tetanus shot every 10 years.    Nutrition:     Eat at least 5 servings of fruits and vegetables each day.    Eat whole-grain bread, whole-wheat pasta and brown rice instead of white grains and rice.    Get adequate Calcium and Vitamin D.     Lifestyle    Exercise at least 150 minutes a week (30 minutes a day, 5 days a week). This will help you control your weight and prevent disease.    Limit alcohol to one drink per day.    No smoking.     Wear sunscreen to prevent skin cancer.     See your dentist every six months for an exam and cleaning.    See your eye doctor every 1 to 2 years.

## 2018-06-29 NOTE — MR AVS SNAPSHOT
After Visit Summary   6/29/2018    Becky Lay    MRN: [id number]           Patient Information     Date Of Birth          1960        Visit Information        Provider Department      6/29/2018 9:30 AM Paty Thomson APRN [facility]        Today's Diagnoses     Routine general medical examination at a health care facility    -  1    Need for Tdap vaccination        Cervicalgia          Care Instructions    I will connect with you via [email or web address] about trigger point injections (we could also consider dry needling).       Preventive Health Recommendations  Female Ages 50 - 64    Yearly exam: See your health care provider every year in order to  o Review health changes.   o Discuss preventive care.    o Review your medicines if your doctor has prescribed any.      Get a Pap test every three years (unless you have an abnormal result and your provider advises testing more often).    If you get Pap tests with HPV test, you only need to test every 5 years, unless you have an abnormal result.     You do not need a Pap test if your uterus was removed (hysterectomy) and you have not had cancer.    You should be tested each year for STDs (sexually transmitted diseases) if you're at risk.     Have a mammogram every 1 to 2 years.    Have a colonoscopy at age 50, or have a yearly FIT test (stool test). These exams screen for colon cancer.      Have a cholesterol test every 5 years, or more often if advised.    Have a diabetes test (fasting glucose) every three years. If you are at risk for diabetes, you should have this test more often.     If you are at risk for osteoporosis (brittle bone disease), think about having a bone density scan (DEXA).    Shots: Get a flu shot each year. Get a tetanus shot every 10 years.    Nutrition:     Eat at least 5 servings of fruits and vegetables each day.    Eat whole-grain bread, whole-wheat pasta and brown rice instead of white grains and  rice.    Get adequate Calcium and Vitamin D.     Lifestyle    Exercise at least 150 minutes a week (30 minutes a day, 5 days a week). This will help you control your weight and prevent disease.    Limit alcohol to one drink per day.    No smoking.     Wear sunscreen to prevent skin cancer.     See your dentist every six months for an exam and cleaning.    See your eye doctor every 1 to 2 years.            Follow-ups after your visit        Additional Services     GASTROENTEROLOGY ADULT REF PROCEDURE ONLY       Last Lab Result: Creatinine (mg/dL)       Date                     Value                 02/07/2017               0.78             ----------  Body mass index is 21.68 kg/(m^2).      Patient will be contacted to schedule procedure.     Please be aware that coverage of these services is subject to the terms and limitations of your health insurance plan.  Call member services at your health plan with any benefit or coverage questions.  Any procedures must be performed at a [facility] facility OR coordinated by your clinic's referral office.    Please bring the following with you to your appointment:    (1) Any X-Rays, CTs or MRIs which have been performed.  Contact the facility where they were done to arrange for  prior to your scheduled appointment.    (2) List of current medications   (3) This referral request   (4) Any documents/labs given to you for this referral                  Follow-up notes from your care team     Return in about 1 year (around 6/29/2019) for Annual preventative exam.      Who to contact     If you have questions or need follow up information about today's clinic visit or your schedule please contact [facility] directly at [phone].  Normal or non-critical lab and imaging results will be communicated to you by MyChart, letter or phone within 4 business days after the clinic has received the results. If you do not hear from us within 7 days, please contact the clinic  "through [employer] or phone. If you have a critical or abnormal lab result, we will notify you by phone as soon as possible.  Submit refill requests through [employer] or call your pharmacy and they will forward the refill request to us. Please allow 3 business days for your refill to be completed.          Additional Information About Your Visit        [employer] Information     [employer] gives you secure access to your electronic health record. If you see a primary care provider, you can also send messages to your care team and make appointments. If you have questions, please call your primary care clinic.  If you do not have a primary care provider, please call [phone] and they will assist you.        Care EveryWhere ID     This is your Care EveryWhere ID. This could be used by other organizations to access your [facility] medical records  [id number]        Your Vitals Were     Pulse Temperature Height Last Period Pulse Oximetry BMI (Body Mass Index)    68 97.5  F (36.4  C) (Tympanic) 5' 5\" (1.651 m) 06/20/2008 98% 21.68 kg/m2       Blood Pressure from Last 3 Encounters:   06/29/18 110/76   03/16/18 96/64   02/07/17 98/76    Weight from Last 3 Encounters:   06/29/18 130 lb 4.8 oz (59.1 kg)   03/16/18 131 lb 3.2 oz (59.5 kg)   07/27/16 125 lb 12.8 oz (57.1 kg)              We Performed the Following     ADMIN 1st VACCINE     GASTROENTEROLOGY ADULT REF PROCEDURE ONLY     TDAP VACCINE (ADACEL)        Primary Care Provider Office Phone # Fax #    NANCY Ramsay [phone] [address] [place] [address]        Equal Access to Services     [facility]: Hadii sera Sosa, waaxda lujennadaha, qaybta yvette preciado . So [facility] [phone].    ATENCIÓN: Si habla español, tiene a john disposición servicios gratuitos de asistencia lingüística. Llame al [phone].    We comply with applicable federal civil rights laws and " [place] laws. We do not discriminate on the basis of race, color, national origin, age, disability, sex, sexual orientation, or gender identity.            Thank you!     Thank you for choosing [facility]  for your care. Our goal is always to provide you with excellent care. Hearing back from our patients is one way we can continue to improve our services. Please take a few minutes to complete the written survey that you may receive in the mail after your visit with us. Thank you!             Your Updated Medication List - Protect others around you: Learn how to safely use, store and throw away your medicines at www.disposemymeds.org.          This list is accurate as of 6/29/18 10:28 AM.  Always use your most recent med list.                   Brand Name Dispense Instructions for use Diagnosis    CITRACAL/VITAMIN D PO      2 a day        cyclobenzaprine 10 MG tablet    FLEXERIL    45 tablet    Take 1 tablet (10 mg) by mouth 3 times daily as needed for muscle spasms    Acute bilateral low back pain without sciatica

## 2018-07-24 ENCOUNTER — OFFICE VISIT (OUTPATIENT)
Dept: PALLIATIVE MEDICINE | Facility: CLINIC | Age: 58
End: 2018-07-24
Payer: COMMERCIAL

## 2018-07-24 VITALS
BODY MASS INDEX: 21.63 KG/M2 | SYSTOLIC BLOOD PRESSURE: 108 MMHG | DIASTOLIC BLOOD PRESSURE: 74 MMHG | WEIGHT: 130 LBS | OXYGEN SATURATION: 100 % | HEART RATE: 72 BPM

## 2018-07-24 DIAGNOSIS — M79.18 MYOFASCIAL MUSCLE PAIN: Primary | ICD-10-CM

## 2018-07-24 PROCEDURE — 99207 ZZC NO BILLABLE SERVICE THIS VISIT: CPT | Performed by: PAIN MEDICINE

## 2018-07-24 ASSESSMENT — PAIN SCALES - GENERAL: PAINLEVEL: MODERATE PAIN (5)

## 2018-07-24 NOTE — PROGRESS NOTES
Patient reports that her neck/trap pain is relatively under control today.  On exam she has no tenderness to palpation.  Spent 10 minutes discussing the risks and benefits of the procedure.  After conversation decided to not to procedure today.  Instructed patient to call PCP if the symptoms returned.  May double block

## 2018-07-24 NOTE — MR AVS SNAPSHOT
After Visit Summary   7/24/2018    Becky Lay    MRN: [id number]           Patient Information     Date Of Birth          1960        Visit Information        Provider Department      7/24/2018 1:00 PM Ruiz Vigil MD [facility]        Today's Diagnoses     Myofascial muscle pain    -  1      Care Instructions    [facility]   Post Procedure Instructions    Today you had:  trigger point injections        Medications used:     bupivicaine   kenolog   dexamethasone          Go to the emergency room if you develop any shortness of breath    Monitor the injection sites for signs and symptoms of infection-fever, chills, redness, swelling, warmth, or drainage to areas.    You may have soreness at injection sites for up to 24 hours.    You may apply ice to the painful areas to help minimize the discomfort of the needle pokes.    Do not apply heat to sites for at least 12 hours.    You may use anti-inflammatory medications or Tylenol for pain control if necessary    Pain Clinic phone number during work hours Monday-Friday:  [phone]    After hours provider line: [phone]                Follow-ups after your visit        Your next 10 appointments already scheduled     Aug 06, 2018   Procedure with Maikol Bryan MD   [facility] ([facility])    [address] [place] [address] [phone]           [facility] is located at [address] [place]              Who to contact     If you have questions or need follow up information about today's clinic visit or your schedule please contact [facility] directly at [phone].  Normal or non-critical lab and imaging results will be communicated to you by MyChart, letter or phone within 4 business days after the clinic has received the results. If you do not hear from us within 7 days, please contact the clinic  through [employer] or phone. If you have a critical or abnormal lab result, we will notify you by phone as soon as possible.  Submit refill requests through [employer] or call your pharmacy and they will forward the refill request to us. Please allow 3 business days for your refill to be completed.          Additional Information About Your Visit        [employer] Information     [employer] gives you secure access to your electronic health record. If you see a primary care provider, you can also send messages to your care team and make appointments. If you have questions, please call your primary care clinic.  If you do not have a primary care provider, please call [phone] and they will assist you.        Care EveryWhere ID     This is your Care EveryWhere ID. This could be used by other organizations to access your [facility] medical records  [id number]        Your Vitals Were     Pulse Last Period Pulse Oximetry BMI (Body Mass Index)          72 06/20/2008 100% 21.63 kg/m2         Blood Pressure from Last 3 Encounters:   07/24/18 108/74   06/29/18 110/76   03/16/18 96/64    Weight from Last 3 Encounters:   07/24/18 59 kg (130 lb)   06/29/18 59.1 kg (130 lb 4.8 oz)   03/16/18 59.5 kg (131 lb 3.2 oz)              Today, you had the following     No orders found for display       Primary Care Provider Office Phone # Fax #    Paty CHADWICK NANCY Thomson [phone] [address] [place] [address]        Equal Access to Services     [facility]: Hadii aad ku hadasho Soomaali, waaxda luqadaha, qaybta kaalmada adeegyada, waxay omari fernandezn arun méndez . So [facility] [phone].    ATENCIÓN: Si habla español, tiene a john disposición servicios gratuitos de asistencia lingüística. Llame al [phone].    We comply with applicable federal civil rights laws and [place] laws. We do not discriminate on the basis of race, color, national origin, age, disability, sex, sexual orientation,  or gender identity.            Thank you!     Thank you for choosing [facility]  for your care. Our goal is always to provide you with excellent care. Hearing back from our patients is one way we can continue to improve our services. Please take a few minutes to complete the written survey that you may receive in the mail after your visit with us. Thank you!             Your Updated Medication List - Protect others around you: Learn how to safely use, store and throw away your medicines at www.disposemymeds.org.          This list is accurate as of 7/24/18  2:27 PM.  Always use your most recent med list.                   Brand Name Dispense Instructions for use Diagnosis    CITRACAL/VITAMIN D PO      2 a day        cyclobenzaprine 10 MG tablet    FLEXERIL    45 tablet    Take 1 tablet (10 mg) by mouth 3 times daily as needed for muscle spasms    Acute bilateral low back pain without sciatica

## 2018-07-24 NOTE — PATIENT INSTRUCTIONS
[facility]   Post Procedure Instructions    Today you had:  trigger point injections        Medications used:     bupivicaine   kenolog   dexamethasone          Go to the emergency room if you develop any shortness of breath    Monitor the injection sites for signs and symptoms of infection-fever, chills, redness, swelling, warmth, or drainage to areas.    You may have soreness at injection sites for up to 24 hours.    You may apply ice to the painful areas to help minimize the discomfort of the needle pokes.    Do not apply heat to sites for at least 12 hours.    You may use anti-inflammatory medications or Tylenol for pain control if necessary    Pain Clinic phone number during work hours Monday-Friday:  [phone]    After hours provider line: [phone]

## 2018-08-06 ENCOUNTER — HOSPITAL ENCOUNTER (OUTPATIENT)
Facility: CLINIC | Age: 58
Discharge: HOME | End: 2018-08-06
Attending: INTERNAL MEDICINE | Admitting: INTERNAL MEDICINE
Payer: COMMERCIAL

## 2018-08-06 VITALS
SYSTOLIC BLOOD PRESSURE: 99 MMHG | OXYGEN SATURATION: 96 % | DIASTOLIC BLOOD PRESSURE: 77 MMHG | RESPIRATION RATE: 12 BRPM

## 2018-08-06 LAB — COLONOSCOPY: NORMAL

## 2018-08-06 PROCEDURE — 45385 COLONOSCOPY W/LESION REMOVAL: CPT | Mod: PT | Performed by: INTERNAL MEDICINE

## 2018-08-06 PROCEDURE — G0500 MOD SEDAT ENDO SERVICE >5YRS: HCPCS | Performed by: INTERNAL MEDICINE

## 2018-08-06 PROCEDURE — 25000128 H RX IP 250 OP 636: Performed by: INTERNAL MEDICINE

## 2018-08-06 PROCEDURE — 88305 TISSUE EXAM BY PATHOLOGIST: CPT | Performed by: INTERNAL MEDICINE

## 2018-08-06 PROCEDURE — 88305 TISSUE EXAM BY PATHOLOGIST: CPT | Mod: 26 | Performed by: INTERNAL MEDICINE

## 2018-08-06 RX ORDER — LIDOCAINE 40 MG/G
CREAM TOPICAL
Status: DISCONTINUED | OUTPATIENT
Start: 2018-08-06 | End: 2018-08-06 | Stop reason: HOSPADM

## 2018-08-06 RX ORDER — NALOXONE HYDROCHLORIDE 0.4 MG/ML
.1-.4 INJECTION, SOLUTION INTRAMUSCULAR; INTRAVENOUS; SUBCUTANEOUS
Status: DISCONTINUED | OUTPATIENT
Start: 2018-08-06 | End: 2018-08-06 | Stop reason: HOSPADM

## 2018-08-06 RX ORDER — FLUMAZENIL 0.1 MG/ML
0.2 INJECTION, SOLUTION INTRAVENOUS
Status: DISCONTINUED | OUTPATIENT
Start: 2018-08-06 | End: 2018-08-06 | Stop reason: HOSPADM

## 2018-08-06 RX ORDER — ONDANSETRON 4 MG/1
4 TABLET, ORALLY DISINTEGRATING ORAL EVERY 6 HOURS PRN
Status: DISCONTINUED | OUTPATIENT
Start: 2018-08-06 | End: 2018-08-06 | Stop reason: HOSPADM

## 2018-08-06 RX ORDER — ONDANSETRON 2 MG/ML
4 INJECTION INTRAMUSCULAR; INTRAVENOUS
Status: DISCONTINUED | OUTPATIENT
Start: 2018-08-06 | End: 2018-08-06 | Stop reason: HOSPADM

## 2018-08-06 RX ORDER — ONDANSETRON 2 MG/ML
4 INJECTION INTRAMUSCULAR; INTRAVENOUS EVERY 6 HOURS PRN
Status: DISCONTINUED | OUTPATIENT
Start: 2018-08-06 | End: 2018-08-06 | Stop reason: HOSPADM

## 2018-08-06 RX ORDER — FENTANYL CITRATE 50 UG/ML
INJECTION, SOLUTION INTRAMUSCULAR; INTRAVENOUS PRN
Status: DISCONTINUED | OUTPATIENT
Start: 2018-08-06 | End: 2018-08-06 | Stop reason: HOSPADM

## 2018-08-06 NOTE — LETTER
July 16, 2018      Becky Lay  [address] [place] [address]       Thank you for choosing [facility]. You are scheduled for the following service.     Date:  Monday August 6, 2018             Procedure:  COLONOSCOPY  Doctor:        Dr Bryan   Arrival Time:  1pm  *Check in at Emergency/Endoscopy desk*  Procedure Time:  130p    Location:   [facility]        Endoscopy Department, First Floor (Enter through ER Doors) *        [address] [place], [place] [address]      [phone] or [phone] () to reschedule      MIRALAX -GATORADE  PREP  Colonoscopy is the most accurate test to detect colon polyps and colon cancer; and the only test where polyps can be removed. During this procedure, a doctor examines the lining of your large intestine and rectum through a flexible tube.           Transportation  Arrange for a ride for the day of your procedure with a responsible adult.  A taxi ride is not an option unless you are accompanied by a responsible adult. If you fail to arrange transportation with a responsible adult, your procedure will be cancelled and rescheduled.    Purchase the  following supplies at your local pharmacy:  - 2 (two) bisacodyl tablets: each tablet contains 5 mg.  (Dulcolax  laxative NOT Dulcolax  stool softener)   - 1 (one) 8.3 oz bottle of Polyethylene Glycol (PEG) 3350 Powder   (MiraLAX , Smooth LAX , ClearLAX  or equivalent)  - 64 oz Gatorade    Regular Gatorade, Gatorade G2 , Powerade , Powerade Zero  or Pedialyte  is acceptable. Red colored flavors are not allowed; all other colors (yellow, green, orange, purple and blue) are okay. It is also okay to buy two 2.12 oz packets of powdered Gatorade that can be mixed with water to a total volume of 64 oz of liquid.  - 1 (one) 10 oz bottle of Magnesium Citrate (Red colored flavors are not allowed)  It is also okay for you to use a 0.5 oz package of powdered magnesium  citrate (17 g) mixed with 10 oz of water.    PREPARATION FOR COLONOSCOPY    7 days before:    Discontinue fiber supplements and medications containing iron. This includes Metamucil  and Fibercon ; and multivitamins with iron.  3 days before:    Begin a low-fiber diet. A low-fiber diet helps making the cleanout more effective.     Examples of a low-fiber diet include (but are not limited to): white bread, white rice, pasta, crackers, fish, chicken, eggs, ground beef, creamy peanut butter, cooked/steamed/boiled vegetables, canned fruit, bananas, melons, milk, plain yogurt cheese, salad dressing and other condiments.     The following are not allowed on a low-fiber diet: seeds, nuts, popcorn, bran, whole wheat, corn, quinoa, raw fruits and vegetables, berries and dried fruit, beans and lentils.    For additional details on low-fiber diet, please refer to the table on the last page.  2 days before:    Continue the low-fiber diet.     Drink at least 8 glasses of water throughout the day.     Stop eating solid foods at 11:45 pm.  1 day before:    In the morning: begin a clear liquid diet (liquids you can see through).     Examples of a clear liquid diet include: water, clear broth or bouillon, Gatorade, Pedialyte or Powerade, carbonated and non-carbonated soft drinks (Sprite , 7-Up , ginger ale), strained fruit juices without pulp (apple, white grape, white cranberry), Jell-O  and popsicles.     The following are not allowed on a clear liquid diet: red liquids, alcoholic beverages, dairy products (milk, creamer, and yogurt), protein shakes, creamy broths, juice with pulp and chewing tobacco.    At noon: take 2 (two) bisacodyl tablets     At 4 (and no later than 6pm): start drinking the Miralax-Gatorade preparation (8.3 oz of Miralax mixed with 64 oz of Gatorade in a large pitcher). Drink 1(one) 8 oz glass every 15 minutes thereafter, until the mixture is gone.    COLON CLEANSING TIPS: drink adequate amounts of fluids  before and after your colon cleansing to prevent dehydration. Stay near a toilet because you will have diarrhea. Even if you are sitting on the toilet, continue to drink the cleansing solution every 15 minutes. If you feel nauseous or vomit, rinse your mouth with water, take a 15 to 30-minute-break and then continue drinking the solution. You will be uncomfortable until the stool has flushed from your colon (in about 2 to 4 hours). You may feel chilled.              Day of your procedure  You may take all of your morning medications including blood pressure medications, blood thinners (if you have not been instructed to stop these by our office), methadone, anti-seizure medications with sips of water 3 hours prior to your procedure or earlier. Do not take insulin or vitamins prior to your procedure. Continue the clear liquid diet.   4 hours prior: drink 10 oz of magnesium citrate. It may be easier to drink it with a straw.    STOP consuming all liquids after that.     Do not take anything by mouth during this time.     Allow extra time to travel to your procedure as you may need to stop and use a restroom along the way.  You are ready for the procedure, if you followed all instructions and your stool is no longer formed, but clear or yellow liquid. If you are unsure whether your colon is clean, please call our office at [phone] before you leave for your appointment.  Bring the following to your procedure:  - Insurance Card/Photo ID.   - List of current medications including over-the-counter medications and supplements.   - Your rescue inhaler if you currently use one to control asthma.      Canceling or rescheduling your appointment:   If you must cancel or reschedule your appointment, please call [phone] as soon as possible.      COLONOSCOPY PRE-PROCEDURE CHECKLIST  If you have diabetes, ask your regular doctor for diet and medication restrictions.  If you take an anticoagulant or anti-platelet medication  (such as Coumadin , Lovenox , Pradaxa , Xarelto , Eliquis , etc.), please call your primary doctor for advice on holding this medication.  If you take aspirin you may continue to do so.  If you are or may be pregnant, please discuss the risks and benefits of this procedure with your doctor.          What happens during a colonoscopy?    Plan to spend up to two hours, starting at registration time, at the endoscopy center the day of your procedure. The colonoscopy takes an average of 15 to 30 minutes. Recovery time is about 30 minutes.    Before the exam:    You will change into a gown.    Your medical history and medication list will be reviewed with you, unless that has been done over the phone prior to the procedure.     A nurse will insert an intravenous (IV) line into your hand or arm.    The doctor will meet with you and will give you a consent form to sign.    During the exam:     Medicine will be given through the IV line to help you relax.     Your heart rate and oxygen levels will be monitored. If your blood pressure is low, you may be given fluids through the IV line.     The doctor will insert a flexible hollow tube, called a colonoscope, into your rectum. The scope will be advanced slowly through the large intestine (colon).    You may have a feeling of fullness or pressure.     If an abnormal tissue or a polyp is found, the doctor may remove it through the endoscope for closer examination, or biopsy. Tissue removal is painless    After the exam:           Any tissue samples removed during the exam will be sent to a lab for evaluation. It may take 5-7 working days for you to be notified of the results.     A nurse will provide you with complete discharge instructions before you leave the endoscopy center. Be sure to ask the nurse for specific instructions if you take blood thinners such as Aspirin, Coumadin or Plavix.     The doctor will prepare a full report for you and for the physician who referred  you for the procedure.     Your doctor will talk with you about the initial results of your exam.      Medication given during the exam will prohibit you from driving for the rest of the day.     Following the exam, you may resume your normal diet. Your first meal should be light, no greasy foods. Avoid alcohol until the next day.     You may resume your regular activities the day after the procedure.     LOW-FIBER DIET    Foods RECOMMENDED Foods to AVOID   Breads, Cereal, Rice and Pasta:   White bread, rolls, biscuits, croissant and khris toast.   Waffles, Tamazight toast and pancakes.   White rice, noodles, pasta, macaroni and peeled cooked potatoes.   Plain crackers and saltines.   Cooked cereals: farina, cream of rice.   Cold cereals: Puffed Rice , Rice Krispies , Corn Flakes  and Special K    Breads, Cereal, Rice and Pasta:   Breads or rolls with nuts, seeds or fruit.   Whole wheat, pumpernickel, rye breads and cornbread.   Potatoes with skin, brown or wild rice, and kasha (buckwheat).     Vegetables:   Tender cooked and canned vegetables without seeds: carrots, asparagus tips, green or wax beans, pumpkin, spinach, lima beans. Vegetables:   Raw or steamed vegetables.   Vegetables with seeds.   Sauerkraut.   Winter squash, peas, broccoli, Brussel sprouts, cabbage, onions, cauliflower, baked beans, peas and corn.   Fruits:   Strained fruit juice.   Canned fruit, except pineapple.   Ripe bananas and melon. Fruits:   Prunes and prune juice.   Raw fruits.   Dried fruits: figs, dates and raisins.   Milk/Dairy:   Milk: plain or flavored.   Yogurt, custard and ice cream.   Cheese and cottage cheese Milk/Dairy:     Meat and other proteins:   ground, well-cooked tender beef, lamb, ham, veal, pork, fish, poultry and organ meats.   Eggs.   Peanut butter without nuts. Meat and other proteins:   Tough, fibrous meats with gristle.   Dry beans, peas and lentils.   Peanut butter with nuts.   Tofu.   Fats, Snack, Sweets, Condiments  and Beverages:   Margarine, butter, oils, mayonnaise, sour cream and salad dressing, plain gravy.   Sugar, hard candy, clear jelly, honey and syrup.   Spices, cooked herbs, bouillon, broth and soups made with allowed vegetable, ketchup and mustard.   Coffee, tea and carbonated drinks.   Plain cakes, cookies and pretzels.   Gelatin, plain puddings, custard, ice cream, sherbet and popsicles. Fats, Snack, Sweets, Condiments and Beverages:   Nuts, seeds and coconut.   Jam, marmalade and preserves.   Pickles, olives, relish and horseradish.   All desserts containing nuts, seeds, dried fruit and coconut; or made from whole grains or bran.   Candy made with nuts or seeds.   Popcorn.                     DIRECTIONS TO THE ENDOSCOPY DEPARTMENT     From the north ([facility])  Take [address] South, exit on [address] [address]. Get into the left hand pranav, turn left (east), go one-half mile to [address] and turn left. Go north to the first stoplight, take a right on [address] and follow it to the Emergency entrance.    From the south ([facility])  Take 35N to the 35E split and exit on [address] [address]. On [address] [address], turn left (west) to [address]. Turn right (north) on [address]. Go north to the first stoplight, take a right on [address] and follow it to the Emergency entrance.    From the east via 35E ([facility])  Take [address] south to [address] [address] exit. Turn right on [address] . Go west to [address]. Turn right (north) on [address]. Go to the first stoplight, take a right and follow on [address] to the Emergency entrance.    From the east via [address] ([facility])  Take [address] to [address]. Turn left (south) on [address] to [address]. Turn left (east) on [address] and follow it to the Emergency entrance.    From the west via [address] 13 ([place], [place])  Take [address] east to [address]. Turn right  (south) on [address] to [address]. Turn left (east) on [address] and follow it to the Emergency entrance.

## 2018-08-06 NOTE — H&P
Pre-Endoscopy History and Physical     Becky Lay MRN# [id number]   YOB: 1960 Age: 58 year old     Date of Procedure: 8/6/2018  Primary care provider: Paty Thomson  Type of Endoscopy: Colonoscopy with possible biopsy, possible polypectomy  Reason for Procedure: screen  Type of Anesthesia Anticipated: Conscious Sedation    HPI:    Becky is a 58 year old female who will be undergoing the above procedure.      A history and physical has been performed. The patient's medications and allergies have been reviewed. The risks and benefits of the procedure and the sedation options and risks were discussed with the patient.  All questions were answered and informed consent was obtained.      She denies a personal or family history of anesthesia complications or bleeding disorders.     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, 6, 7 DJD     Other and unspecified malignant neoplasm of skin of other and unspecified parts of face     Generalized hyperhidrosis     CARDIOVASCULAR SCREENING; LDL GOAL LESS THAN 160     Menopausal syndrome (hot flashes)     Cervicalgia        Past Medical History:   Diagnosis Date     DJD C56,7         Past Surgical History:   Procedure Laterality Date     C VASQUES W/O FACETEC FORAMOT/DSKC 1/2 VRT SEG, CERVICAL  2004    anterior c- 5, 6 fusion       Social History   Substance Use Topics     Smoking status: Never Smoker     Smokeless tobacco: Never Used     Alcohol use 6.0 oz/week      Comment: one glass per week       Family History   Problem Relation Age of Onset     Osteoperosis Mother      Lipids Mother      HEART DISEASE Father      MI age 50/BP and Chol Meds     Cancer Maternal Grandmother      colon     Cancer Maternal Grandfather      lung ca     Colon Cancer No family hx of        Prior to Admission medications    Medication Sig Start Date End Date Taking? Authorizing Provider   CITRACAL/VITAMIN D PO 2 a day    Reported, Patient   cyclobenzaprine (FLEXERIL) 10  "MG tablet Take 1 tablet (10 mg) by mouth 3 times daily as needed for muscle spasms 3/16/18   Casandra, Lisa Martínez MD       Allergies   Allergen Reactions     No Known Allergies         REVIEW OF SYSTEMS:   5 point ROS negative except as noted above in HPI, including Gen., Resp., CV, GI &  system review.    PHYSICAL EXAM:   [facility] 06/20/2008 Estimated body mass index is 21.63 kg/(m^2) as calculated from the following:    Height as of 6/29/18: 1.651 m (5' 5\").    Weight as of 7/24/18: 59 kg (130 lb).   GENERAL APPEARANCE: alert, and oriented  MENTAL STATUS: alert  AIRWAY EXAM: Mallampatti Class I (visualization of the soft palate, fauces, uvula, anterior and posterior pillars)  RESP: lungs clear to auscultation - no rales, rhonchi or wheezes  CV: regular rates and rhythm  DIAGNOSTICS:    Not indicated    IMPRESSION   ASA Class 2 - Mild systemic disease    PLAN:   Plan for Colonoscopy with possible biopsy, possible polypectomy. We discussed the risks, benefits and alternatives and the patient wished to proceed.    The above has been forwarded to the consulting provider.      Signed Electronically by: Maikol Bryan  August 6, 2018          "

## 2018-08-07 LAB — COPATH REPORT: NORMAL

## 2018-09-24 ENCOUNTER — OFFICE VISIT (OUTPATIENT)
Dept: PEDIATRICS | Facility: CLINIC | Age: 58
End: 2018-09-24
Payer: COMMERCIAL

## 2018-09-24 VITALS
DIASTOLIC BLOOD PRESSURE: 75 MMHG | WEIGHT: 127.3 LBS | HEART RATE: 80 BPM | BODY MASS INDEX: 21.18 KG/M2 | TEMPERATURE: 97 F | SYSTOLIC BLOOD PRESSURE: 106 MMHG

## 2018-09-24 DIAGNOSIS — R14.0 BLOATED ABDOMEN: Primary | ICD-10-CM

## 2018-09-24 DIAGNOSIS — Z23 NEED FOR PROPHYLACTIC VACCINATION AND INOCULATION AGAINST INFLUENZA: ICD-10-CM

## 2018-09-24 LAB
ALBUMIN SERPL-MCNC: 4.3 G/DL (ref 3.4–5)
ALP SERPL-CCNC: 57 U/L (ref 40–150)
ALT SERPL W P-5'-P-CCNC: 23 U/L (ref 0–50)
AMYLASE SERPL-CCNC: 55 U/L (ref 30–110)
ANION GAP SERPL CALCULATED.3IONS-SCNC: 7 MMOL/L (ref 3–14)
AST SERPL W P-5'-P-CCNC: 21 U/L (ref 0–45)
BASOPHILS # BLD AUTO: 0 10E9/L (ref 0–0.2)
BASOPHILS NFR BLD AUTO: 0.5 %
BILIRUB SERPL-MCNC: 0.6 MG/DL (ref 0.2–1.3)
BUN SERPL-MCNC: 12 MG/DL (ref 7–30)
CALCIUM SERPL-MCNC: 9.2 MG/DL (ref 8.5–10.1)
CHLORIDE SERPL-SCNC: 103 MMOL/L (ref 94–109)
CO2 SERPL-SCNC: 26 MMOL/L (ref 20–32)
CREAT SERPL-MCNC: 0.8 MG/DL (ref 0.52–1.04)
DIFFERENTIAL METHOD BLD: NORMAL
EOSINOPHIL # BLD AUTO: 0.1 10E9/L (ref 0–0.7)
EOSINOPHIL NFR BLD AUTO: 2.2 %
ERYTHROCYTE [DISTWIDTH] IN BLOOD BY AUTOMATED COUNT: 12.3 % (ref 10–15)
GFR SERPL CREATININE-BSD FRML MDRD: 73 ML/MIN/1.7M2
GLUCOSE SERPL-MCNC: 86 MG/DL (ref 70–99)
HCT VFR BLD AUTO: 39.3 % (ref 35–47)
HGB BLD-MCNC: 12.8 G/DL (ref 11.7–15.7)
LIPASE SERPL-CCNC: 148 U/L (ref 73–393)
LYMPHOCYTES # BLD AUTO: 1.5 10E9/L (ref 0.8–5.3)
LYMPHOCYTES NFR BLD AUTO: 27.1 %
MCH RBC QN AUTO: 28.1 PG (ref 26.5–33)
MCHC RBC AUTO-ENTMCNC: 32.6 G/DL (ref 31.5–36.5)
MCV RBC AUTO: 86 FL (ref 78–100)
MONOCYTES # BLD AUTO: 0.5 10E9/L (ref 0–1.3)
MONOCYTES NFR BLD AUTO: 8.6 %
NEUTROPHILS # BLD AUTO: 3.4 10E9/L (ref 1.6–8.3)
NEUTROPHILS NFR BLD AUTO: 61.6 %
PLATELET # BLD AUTO: 200 10E9/L (ref 150–450)
POTASSIUM SERPL-SCNC: 4.6 MMOL/L (ref 3.4–5.3)
PROT SERPL-MCNC: 7.6 G/DL (ref 6.8–8.8)
RBC # BLD AUTO: 4.56 10E12/L (ref 3.8–5.2)
SODIUM SERPL-SCNC: 136 MMOL/L (ref 133–144)
TSH SERPL DL<=0.005 MIU/L-ACNC: 1.63 MU/L (ref 0.4–4)
WBC # BLD AUTO: 5.6 10E9/L (ref 4–11)

## 2018-09-24 PROCEDURE — 99214 OFFICE O/P EST MOD 30 MIN: CPT | Mod: 25 | Performed by: NURSE PRACTITIONER

## 2018-09-24 PROCEDURE — 36415 COLL VENOUS BLD VENIPUNCTURE: CPT | Performed by: NURSE PRACTITIONER

## 2018-09-24 PROCEDURE — 85025 COMPLETE CBC W/AUTO DIFF WBC: CPT | Performed by: NURSE PRACTITIONER

## 2018-09-24 PROCEDURE — 82150 ASSAY OF AMYLASE: CPT | Performed by: NURSE PRACTITIONER

## 2018-09-24 PROCEDURE — 84443 ASSAY THYROID STIM HORMONE: CPT | Performed by: NURSE PRACTITIONER

## 2018-09-24 PROCEDURE — 90471 IMMUNIZATION ADMIN: CPT | Performed by: NURSE PRACTITIONER

## 2018-09-24 PROCEDURE — 80053 COMPREHEN METABOLIC PANEL: CPT | Performed by: NURSE PRACTITIONER

## 2018-09-24 PROCEDURE — 83690 ASSAY OF LIPASE: CPT | Performed by: NURSE PRACTITIONER

## 2018-09-24 PROCEDURE — 90682 RIV4 VACC RECOMBINANT DNA IM: CPT | Performed by: NURSE PRACTITIONER

## 2018-09-24 NOTE — MR AVS SNAPSHOT
After Visit Summary   9/24/2018    Becky Lay    MRN: [id number]           Patient Information     Date Of Birth          1960        Visit Information        Provider Department      9/24/2018 10:45 AM Paty Thomson APRN [facility]        Today's Diagnoses     Bloated abdomen    -  1    Need for prophylactic vaccination and inoculation against influenza          Care Instructions    Continue the gluten free diet for now. Expect a call to schedule the US and expect to hear from me within 2 days. We'll do lab work for now.           Follow-ups after your visit        Future tests that were ordered for you today     Open Future Orders        Priority Expected Expires Ordered    US Pelvic Complete with Transvaginal Routine  9/24/2019 9/24/2018            Who to contact     If you have questions or need follow up information about today's clinic visit or your schedule please contact [facility] directly at [phone].  Normal or non-critical lab and imaging results will be communicated to you by Quosishart, letter or phone within 4 business days after the clinic has received the results. If you do not hear from us within 7 days, please contact the clinic through Quosishart or phone. If you have a critical or abnormal lab result, we will notify you by phone as soon as possible.  Submit refill requests through PlayRaven or call your pharmacy and they will forward the refill request to us. Please allow 3 business days for your refill to be completed.          Additional Information About Your Visit        MyChart Information     PlayRaven gives you secure access to your electronic health record. If you see a primary care provider, you can also send messages to your care team and make appointments. If you have questions, please call your primary care clinic.  If you do not have a primary care provider, please call [phone] and they will assist you.        Care  EveryWhere ID     This is your Care EveryWhere ID. This could be used by other organizations to access your [place] medical records  [id number]        Your Vitals Were     Pulse Temperature Last Period BMI (Body Mass Index)          80 97  F (36.1  C) (Tympanic) 06/20/2008 21.18 kg/m2         Blood Pressure from Last 3 Encounters:   09/24/18 106/75   08/06/18 99/77   07/24/18 108/74    Weight from Last 3 Encounters:   09/24/18 127 lb 4.8 oz (57.7 kg)   07/24/18 130 lb (59 kg)   06/29/18 130 lb 4.8 oz (59.1 kg)              We Performed the Following     Amylase     CBC with platelets differential     Comprehensive metabolic panel     FLU VACCINE, (RIV4) RECOMBINANT HA  , IM (FluBlok, egg free) [23018]- >18 YRS (FMG recommended  50-64 YRS)     Lipase     TSH with free T4 reflex     Vaccine Administration, Initial [37692]        Primary Care Provider Office Phone # Fax #    NANCY Ramsay [phone] [address] [place] [address]        Equal Access to Services     [facility]: Hadii aad ku hadasho Soyoshi, waaxda luqadaha, qaybta kaalmada adestephie, yvette méndez . So [facility] [phone].    ATENCIÓN: Si habla español, tiene a john disposición servicios gratuitos de asistencia lingüística. Llame al [phone].    We comply with applicable federal civil rights laws and [place] laws. We do not discriminate on the basis of race, color, national origin, age, disability, sex, sexual orientation, or gender identity.            Thank you!     Thank you for choosing [facility]  for your care. Our goal is always to provide you with excellent care. Hearing back from our patients is one way we can continue to improve our services. Please take a few minutes to complete the written survey that you may receive in the mail after your visit with us. Thank you!             Your Updated Medication List - Protect others around you: Learn  how to safely use, store and throw away your medicines at www.disposemymeds.org.          This list is accurate as of 9/24/18 11:16 AM.  Always use your most recent med list.                   Brand Name Dispense Instructions for use Diagnosis    CITRACAL/VITAMIN D PO      2 a day        cyclobenzaprine 10 MG tablet    FLEXERIL    45 tablet    Take 1 tablet (10 mg) by mouth 3 times daily as needed for muscle spasms    Acute bilateral low back pain without sciatica

## 2018-09-24 NOTE — PROGRESS NOTES
SUBJECTIVE:   Becky Lay is a 58 year old female who presents to clinic today for the following health issues    Bloating      Duration: 2wls    Description (location/character/radiation): mid torso       Associated flank pain: None    Intensity:  moderate    Accompanying signs and symptoms:        Fever/Chills: no        Gas/Bloating: YES- bloating only       Nausea/vomitting: YES- slight nausea, no vomitting       Diarrhea: no        Dysuria or Hematuria: no     History (previous similar pain/trauma/previous testing): no    Precipitating or alleviating factors:       Pain worse with eating/BM/urination: with eating, feels full quicker       Pain relieved by BM: no     Therapies tried and outcome: Just started gluten free diet yesterday    LMP:  not applicable      She notes over the past 2 wks, she has had increase in abd bloating without pain. She does feel eating makes it worse, she has lost 3 labs since last office visit 2 month ago. She started gluten free yesterday. She notes her stools have been sluggish, constipated, stooling every 2-4 days in the past 2 wks, and typically she stools every day. She denies changes in consistency. She had a colonoscopy 8/6/18, and had polyp and was tubular adenoma, recommended Q5 yr colonoscopies. No blood in stool, nausea and vomiting. No recent changes in diet, fever. No specific foods that trigger her symptoms. She is lactose intolerant.     Wt Readings from Last 4 Encounters:   09/24/18 127 lb 4.8 oz (57.7 kg)   07/24/18 130 lb (59 kg)   06/29/18 130 lb 4.8 oz (59.1 kg)   03/16/18 131 lb 3.2 oz (59.5 kg)       Problem list and histories reviewed & adjusted, as indicated.  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, 6, 7 DJD     Other and unspecified malignant neoplasm of skin of other and unspecified parts of face     Generalized hyperhidrosis     CARDIOVASCULAR SCREENING; LDL GOAL LESS THAN 160     Menopausal syndrome  (hot flashes)     Cervicalgia     Past Surgical History:   Procedure Laterality Date     C VASQUES W/O FACETEC FORAMOT/DSKC 1/2 VRT SEG, CERVICAL  2004    anterior c- 5, 6 fusion       Social History   Substance Use Topics     Smoking status: Never Smoker     Smokeless tobacco: Never Used     Alcohol use 6.0 oz/week      Comment: one glass per week     Family History   Problem Relation Age of Onset     Osteoporosis Mother      Lipids Mother      HEART DISEASE Father      MI age 50/BP and Chol Meds     Cancer Maternal Grandmother      colon     Cancer Maternal Grandfather      lung ca     Colon Cancer No family hx of            Reviewed and updated as needed this visit by clinical staff       Reviewed and updated as needed this visit by Provider         ROS:  Constitutional, HEENT, cardiovascular, pulmonary, gi and gu systems are negative, except as otherwise noted.    OBJECTIVE:     /75  Pulse 80  Temp 97  F (36.1  C) (Tympanic)  Wt 127 lb 4.8 oz (57.7 kg)  LMP 06/20/2008  BMI 21.18 kg/m2  Body mass index is 21.18 kg/(m^2).  GENERAL: healthy, alert and no distress  ABDOMEN: soft, nontender, no hepatosplenomegaly, no masses and bowel sounds normal  SKIN: no suspicious lesions or rashes  PSYCH: mentation appears normal, affect normal/bright      ASSESSMENT/PLAN:     1. Bloated abdomen  We reviewed her symptoms in depth - she has had a 3 lbs weight loss, bloating symptoms without increase is gas, pain, fever, nausea or other concerning symptoms. She is specifically worried about ovarian cancer - will do US to rule this out. She did just have a colonoscopy recently which found polyp and no other concerning findings. She does admit to decreased stooling frequency which she does not think is contributory to her bloating symptoms, however I did point out that this could be the cause. She has not had diarrhea - suspicion is low for celiac, however encouraged her to stay on gluten free diet to see if it makes any  difference. Will also draw labs to r/o other potential causes.   - US Pelvic Complete with Transvaginal; Future  - TSH with free T4 reflex  - CBC with platelets differential  - Comprehensive metabolic panel  - Amylase  - Lipase    2. Need for prophylactic vaccination and inoculation against influenza    - FLU VACCINE, (RIV4) RECOMBINANT HA  , IM (FluBlok, egg free) [98138]- >18 YRS (FMG recommended  50-64 YRS)  - Vaccine Administration, Initial [40593]    Patient Instructions   Continue the gluten free diet for now. Expect a call to schedule the US and expect to hear from me within 2 days. We'll do lab work for now.       NANCY Soriano [facility] SANDIE    Injectable Influenza Immunization Documentation    1.  Is the person to be vaccinated sick today?   No    2. Does the person to be vaccinated have an allergy to a component   of the vaccine?   No  Egg Allergy Algorithm Link    3. Has the person to be vaccinated ever had a serious reaction   to influenza vaccine in the past?   No    4. Has the person to be vaccinated ever had Guillain-Barré syndrome?   No    Form completed by self       123

## 2018-09-24 NOTE — PATIENT INSTRUCTIONS
Continue the gluten free diet for now. Expect a call to schedule the US and expect to hear from me within 2 days. We'll do lab work for now.

## 2018-09-28 ENCOUNTER — HOSPITAL ENCOUNTER (OUTPATIENT)
Dept: ULTRASOUND IMAGING | Facility: CLINIC | Age: 58
Discharge: HOME | End: 2018-09-28
Attending: NURSE PRACTITIONER | Admitting: NURSE PRACTITIONER
Payer: COMMERCIAL

## 2018-09-28 ENCOUNTER — MYC MEDICAL ADVICE (OUTPATIENT)
Dept: PEDIATRICS | Facility: CLINIC | Age: 58
End: 2018-09-28

## 2018-09-28 DIAGNOSIS — R14.0 BLOATED ABDOMEN: ICD-10-CM

## 2018-09-28 PROCEDURE — 76830 TRANSVAGINAL US NON-OB: CPT

## 2018-09-28 NOTE — TELEPHONE ENCOUNTER
Patient reports symptoms improved with the GF diet, wants to hold on CT.     I updated Paty who agrees with the plan.

## 2019-08-19 ENCOUNTER — TELEPHONE (OUTPATIENT)
Dept: PEDIATRICS | Facility: CLINIC | Age: 59
End: 2019-08-19

## 2019-08-19 DIAGNOSIS — M54.2 CERVICALGIA: Primary | ICD-10-CM

## 2019-08-19 NOTE — TELEPHONE ENCOUNTER
Reason for Call:  Call back    Detailed comments: Needs new referral for pain clinic in [place] as the past referral was over a year old and that they stated she needs a new referral from provider in order to schedule with them.     Phone Number Patient can be reached at: Home number on file [phone] (home)    Best Time: any    Can we leave a detailed message on this number? YES    Call taken on 8/19/2019 at 12:17 PM by Jaycee Mayen

## 2019-08-21 NOTE — TELEPHONE ENCOUNTER
Routing to Paty - Ok for order? If ok, please route to MA/TC pool to give patient scheduling number.     Called patient, is having a flare of her neck arthritis. Was intermittent, but becoming more daily. Also having headaches with it.   Using Flexeril prn, icing, ibuprofen prn, PT exercises.     Patient would like Trigger Point injection order from  Pain Clinic.   Last order was placed 6/29/18.   Was seen by  Pain Clinic them on 7/24/18, but symptoms were resolved, so no injection was done.

## 2019-08-21 NOTE — TELEPHONE ENCOUNTER
I called and spoke to the pt. She is aware of the scheduling number.   Jalyn Alejo on 8/21/2019 at 6:26 PM

## 2019-08-22 NOTE — PROGRESS NOTES
[facility] - Procedure Note    Date of Visit: 8/23/2019    Pre procedure Diagnosis: myofascial pain/myositis 60.9   Post procedure Diagnosis: Same  Procedure performed: trigger point injections  Complications: none  Operators: Jacquelyn CRUZ [facility]    /77   Pulse 77   LMP 06/20/2008   SpO2 98%     Indications:   Becky Lay is a 59 year old female with a history of neck pain.  Exam shows myofascial pain of the muscle groups listed below and they have tried conservative treatment including PT and medications.    Options/alternatives, benefits and risks were discussed with the patient including bleeding, infection, tissue trauma and pnuemothorax.  Questions were answered to her satisfaction and she agrees to proceed. Voluntary informed consent was obtained and signed.     Vitals allergies and medications were reviewed.    This is her first trigger point injections.    Procedure:  After getting informed consent, a Pause for the Cause was performed.    Trigger points were identified by patient, and marked when appropriate.  The area was prepped with Chloroprep.    Using clean technique, injections were completed using a 25G, 1.5 inch needle.  After negative aspiration, injection was completed.  A total of 6 locations were injected.  When possible, tissue was retracted from the chest wall to avoid lung injury.    Muscle groups injected:  Bilateral rhomboids, trapezius, splenius capitis, and levator scapulae.     Injection solution contained:  9ml of 0.5% bupivacaine    Hemostasis was achieved, the area was cleaned, and bandaids were placed when appropriate.  The patient tolerated the procedure well.  Breath sounds were normal.      Follow-up includes:   -f/u with the referring provider, ask for referral for comprehensive management if needed  -repeat as needed        Jacquelyn CRUZ CNP  [facility]

## 2019-08-23 ENCOUNTER — OFFICE VISIT (OUTPATIENT)
Dept: PALLIATIVE MEDICINE | Facility: CLINIC | Age: 59
End: 2019-08-23
Payer: COMMERCIAL

## 2019-08-23 VITALS — OXYGEN SATURATION: 98 % | SYSTOLIC BLOOD PRESSURE: 116 MMHG | HEART RATE: 77 BPM | DIASTOLIC BLOOD PRESSURE: 77 MMHG

## 2019-08-23 DIAGNOSIS — M79.18 MYOFASCIAL PAIN: Primary | ICD-10-CM

## 2019-08-23 PROCEDURE — 20553 NJX 1/MLT TRIGGER POINTS 3/>: CPT | Performed by: NURSE PRACTITIONER

## 2019-08-23 RX ORDER — BUPIVACAINE HYDROCHLORIDE 5 MG/ML
9 INJECTION, SOLUTION EPIDURAL; INTRACAUDAL ONCE
Status: COMPLETED | OUTPATIENT
Start: 2019-08-23 | End: 2019-08-23

## 2019-08-23 RX ORDER — BUPIVACAINE HYDROCHLORIDE 5 MG/ML
9 INJECTION, SOLUTION PERINEURAL ONCE
Status: DISCONTINUED | OUTPATIENT
Start: 2019-08-23 | End: 2019-08-23 | Stop reason: CLARIF

## 2019-08-23 RX ADMIN — BUPIVACAINE HYDROCHLORIDE 45 MG: 5 INJECTION, SOLUTION EPIDURAL; INTRACAUDAL at 09:45

## 2019-08-23 ASSESSMENT — PAIN SCALES - GENERAL: PAINLEVEL: SEVERE PAIN (6)

## 2019-08-23 NOTE — PATIENT INSTRUCTIONS
[facility]   Post Procedure Instructions    Today you had:  trigger point injections        Medications used:  bupivicaine          Go to the emergency room if you develop any shortness of breath    Monitor the injection sites for signs and symptoms of infection-fever, chills, redness, swelling, warmth, or drainage to areas.    You may have soreness at injection sites for up to 24 hours.    You may apply ice to the painful areas to help minimize the discomfort of the needle pokes.    Do not apply heat to sites for at least 12 hours.    You may use anti-inflammatory medications or Tylenol for pain control if necessary    Pain Clinic phone number during work hours Monday-Friday:  [phone]    After hours provider line: [phone]

## 2019-08-27 ENCOUNTER — TELEPHONE (OUTPATIENT)
Dept: PALLIATIVE MEDICINE | Facility: CLINIC | Age: 59
End: 2019-08-27

## 2019-08-27 DIAGNOSIS — M54.2 CERVICALGIA: Primary | ICD-10-CM

## 2019-08-27 NOTE — TELEPHONE ENCOUNTER
Received call from patient who had TPI last week and just has a question about what to expect post-procedure. She states the muscles around her shoulders/neck have relaxed however she is still having headaches. She is wondering if it's normal to still have headaches or should that be resolved? Phone #[phone] -- ok to leave message        Hanna Nayak    [facility]

## 2019-08-27 NOTE — TELEPHONE ENCOUNTER
Called pt and LM to call back to discuss    Alyssa Ann  BSN, RN Care Coordinator  [facility]

## 2019-08-27 NOTE — TELEPHONE ENCOUNTER
Called patient. She reports that does have intermittent HA still, has had some relief from muscle tension in neck/shoulder area. Advised that she may be sore for first few days afterward due to needle stick tenderness. Advised that only numbing medication was used, no steroid. Advised that she may find more benefit in a few days when tenderness dissipates. Advised that still has some potential for HA relief, try adding heat and gentle stretching. She is currently using ice and Aleve. Advised that can contact PCP if would like to be seen in clinic for further discussion of other options, needs referral.     Alyssa HASTINGSN, RN Care Coordinator  [facility]

## 2019-09-03 ENCOUNTER — TELEPHONE (OUTPATIENT)
Dept: PEDIATRICS | Facility: CLINIC | Age: 59
End: 2019-09-03

## 2019-09-03 NOTE — LETTER
September 3, 2019      Becky Lay  [address] [place] [address]        Dear Becky,       We care about your health and have reviewed your health plan including your medical conditions, medications, and lab results.  Based on this review, it is recommended that you follow up regarding the following health topic(s):  -Cervical Cancer Screening    We recommend you take the following action(s):  -schedule a PAP SMEAR EXAM which is due.  Please disregard this reminder if you have had this exam elsewhere within the last year.  It would be helpful for us to have a copy of your recent pap smear report to update your records.     Please call us at the [facility] - [phone] (or use [email or web address]) to address the above recommendations.     Thank you for trusting [facility] and we appreciate the opportunity to serve you.  We look forward to supporting your healthcare needs in the future.    Healthy Regards,    Your Health Care Team  [facility]

## 2019-09-03 NOTE — TELEPHONE ENCOUNTER
Panel Management Review            Composite cancer screening  Chart review shows that this patient is due/due soon for the following Pap Smear  Summary:    Patient is due/failing the following:   PAP    Action needed:   Patient needs office visit for pap smear.    Type of outreach:    Sent letter.    Questions for provider review:    None                                                                                                                                    Darcy Alfaro CMA([facility])

## 2019-09-16 NOTE — TELEPHONE ENCOUNTER
Patient called requested another order so that the patient can return to the pain clinic, please place order so that she can go back as she is still having pain     Please call the patient and let her know that this was done so she can call and make the appt

## 2019-09-16 NOTE — TELEPHONE ENCOUNTER
Please see messages below patient is still having headaches and pain.     8/23/19 visit note:  Follow-up includes:   -f/u with the referring provider, ask for referral for comprehensive management if needed  -repeat as needed      Please review and advise.     Lawanda Briceño RN Flex

## 2019-09-17 ENCOUNTER — OFFICE VISIT (OUTPATIENT)
Dept: PEDIATRICS | Facility: CLINIC | Age: 59
End: 2019-09-17
Payer: COMMERCIAL

## 2019-09-17 ENCOUNTER — TELEPHONE (OUTPATIENT)
Dept: PALLIATIVE MEDICINE | Facility: CLINIC | Age: 59
End: 2019-09-17

## 2019-09-17 ENCOUNTER — ANCILLARY PROCEDURE (OUTPATIENT)
Dept: MAMMOGRAPHY | Facility: CLINIC | Age: 59
End: 2019-09-17
Attending: NURSE PRACTITIONER
Payer: COMMERCIAL

## 2019-09-17 VITALS
OXYGEN SATURATION: 97 % | BODY MASS INDEX: 22.02 KG/M2 | DIASTOLIC BLOOD PRESSURE: 73 MMHG | HEART RATE: 90 BPM | TEMPERATURE: 99.1 F | SYSTOLIC BLOOD PRESSURE: 110 MMHG | WEIGHT: 132.2 LBS | HEIGHT: 65 IN

## 2019-09-17 DIAGNOSIS — M79.18 MYOFASCIAL PAIN: Primary | ICD-10-CM

## 2019-09-17 DIAGNOSIS — Z12.31 VISIT FOR SCREENING MAMMOGRAM: ICD-10-CM

## 2019-09-17 DIAGNOSIS — Z12.4 SCREENING FOR MALIGNANT NEOPLASM OF CERVIX: ICD-10-CM

## 2019-09-17 DIAGNOSIS — N95.2 POST-MENOPAUSAL ATROPHIC VAGINITIS: ICD-10-CM

## 2019-09-17 DIAGNOSIS — M54.50 ACUTE BILATERAL LOW BACK PAIN WITHOUT SCIATICA: ICD-10-CM

## 2019-09-17 DIAGNOSIS — Z00.00 ROUTINE GENERAL MEDICAL EXAMINATION AT A HEALTH CARE FACILITY: Primary | ICD-10-CM

## 2019-09-17 PROCEDURE — 90682 RIV4 VACC RECOMBINANT DNA IM: CPT | Performed by: NURSE PRACTITIONER

## 2019-09-17 PROCEDURE — G0145 SCR C/V CYTO,THINLAYER,RESCR: HCPCS | Performed by: NURSE PRACTITIONER

## 2019-09-17 PROCEDURE — 90471 IMMUNIZATION ADMIN: CPT | Performed by: NURSE PRACTITIONER

## 2019-09-17 PROCEDURE — 87624 HPV HI-RISK TYP POOLED RSLT: CPT | Performed by: NURSE PRACTITIONER

## 2019-09-17 PROCEDURE — 99396 PREV VISIT EST AGE 40-64: CPT | Mod: 25 | Performed by: NURSE PRACTITIONER

## 2019-09-17 PROCEDURE — 77067 SCR MAMMO BI INCL CAD: CPT | Mod: TC

## 2019-09-17 RX ORDER — ESTRADIOL 0.1 MG/G
2 CREAM VAGINAL
Qty: 42.5 G | Refills: 3 | Status: SHIPPED | OUTPATIENT
Start: 2019-09-19 | End: 2019-09-25

## 2019-09-17 RX ORDER — CYCLOBENZAPRINE HCL 10 MG
10 TABLET ORAL 3 TIMES DAILY PRN
Qty: 45 TABLET | Refills: 1 | Status: SHIPPED | OUTPATIENT
Start: 2019-09-17 | End: 2019-09-23 | Stop reason: ALTCHOICE

## 2019-09-17 ASSESSMENT — ENCOUNTER SYMPTOMS
JOINT SWELLING: 0
PALPITATIONS: 0
NAUSEA: 0
BREAST MASS: 0
HEADACHES: 1
ARTHRALGIAS: 1
FEVER: 0
HEMATOCHEZIA: 0
CHILLS: 0
NERVOUS/ANXIOUS: 0
DIZZINESS: 0
EYE PAIN: 0
CONSTIPATION: 0
SORE THROAT: 0
FREQUENCY: 0
PARESTHESIAS: 0
DYSURIA: 0
HEARTBURN: 0
COUGH: 0
DIARRHEA: 0
MYALGIAS: 0
ABDOMINAL PAIN: 0
SHORTNESS OF BREATH: 0
WEAKNESS: 0
HEMATURIA: 0

## 2019-09-17 ASSESSMENT — MIFFLIN-ST. JEOR: SCORE: 1161.57

## 2019-09-17 NOTE — TELEPHONE ENCOUNTER
Hello -      Order received from your clinic states Trigger Point Injection and Comprehensive Management. If you would like the patient scheduled for both of these they would need separate orders. If you are wanting the patient to be scheduled for a Comprehensive Evaluation we would need a new order (9050.118) with all of the drop down questions answered. This current referral is not correct for that as that part of the referral has been deleted. Thank you.      Hanna Nayak    [facility]

## 2019-09-18 ENCOUNTER — TELEPHONE (OUTPATIENT)
Dept: PEDIATRICS | Facility: CLINIC | Age: 59
End: 2019-09-18

## 2019-09-18 DIAGNOSIS — M54.2 CERVICALGIA: Primary | ICD-10-CM

## 2019-09-18 NOTE — TELEPHONE ENCOUNTER
New order came in yesterday for Comprehensive Management however the pt would like to be seen at [facility] and not [facility]. Pt calling to get order corrected. Order has to stated [facility].      Isabel SIMPSON    [facility]

## 2019-09-18 NOTE — TELEPHONE ENCOUNTER
"Reason for Call:  Other     Detailed comments: Referral for Pain Management Clinic is supposed to state \"[facility]\" not [facility] per the pt while she tried to make an appt with them and was told this. Please change as soon as possible and advise further if necessary. Thanks!    Phone Number Patient can be reached at: Home number on file [phone] (home)    Best Time: any    Can we leave a detailed message on this number? YES    Call taken on 9/18/2019 at 10:06 AM by Jaycee Mayen      "

## 2019-09-18 NOTE — TELEPHONE ENCOUNTER
1st attempt l/m to let the pt know the referral has been placed.   Jalyn Alejo on 9/18/2019 at 5:51 PM

## 2019-09-19 NOTE — TELEPHONE ENCOUNTER
Pain Management Center Referral      1. Confirmed address with patient? Yes  2. Confirmed phone number with patient? Yes  3. Confirmed referring provider? Yes  4. Is the PCP the same as the referring provider? Yes  5. Has the patient been to any previous pain clinics? Yes  (If yes, send BIBIANA with welcome letter)  6. Which insurance are we to bill for this appointment?  [employer]    7. Informed pt of cancellation (48 hour) policy? Yes    REGARDING OPIOID MEDICATIONS: We will always address appropriateness of opioid pain medications, but we generally will not automatically take on a prescribing role. When we do take on prescribing of opioids for chronic pain, it is in collaboration with the referring physician for an intermediate period of time (months), with an expectation that the primary physician or provider will assume the prescribing role if medications are effective at stable doses with demonstrated compliance. Therefore, please do not assume that your prescribing responsibilities end on the day of pain clinic consultation.  8. Informed pt of prescribing policy? Yes    9.Please be aware that once you are established with a pain provider and location, you will need to continue have all future visits with that provider and location. It is best to determine what location is the most convenient for you and schedule with that one.    ** PATIENT INFORMED OF THIS POLICY Yes      9. Referring Provider: Paty Thomson     10. Criteria for Triage Eval:   -Missed/Failed 1st DUAL appointment? N/A   -Medication Focused? N/A   -Mental Health Concerns? (e.g. Recent psych hospitalization/snap shot)? N/A   -Active substance abuse? N/A   -Patient behaviors (e.g. Offensive language/raised voice)? N/A

## 2019-09-19 NOTE — PROGRESS NOTES
"      [facility]     Date of visit: 9/19/2019    Reason for consultation:    Becky Lay is a 59 year old female who is seen in consultation today at the request of her PCP,  Paty Thomson for evaluation of her pain issues and recommendations for management, with specific emphasis on  Reason for Referral: Evaluation for comprehensive services- patients will be evaluated if appropriate for comprehensive service including medication changes, procedures, pain psychology, and pain physical therapy.  While involved with comprehensive services, pain providers will work with referring provider/PCP to stabilize appropriate medication management, with long-term plan of transition of prescribing back to referring provider/PCP upon completion of comprehensive services.      Please complete the following questions:    Do you have any specific questions for the pain specialist? No    Are there any red flags that may impact the assessment or management of the patient? None      What is your diagnosis for the patient's pain? cervalgia     Please see the [facility] and Home health questionnaire which the patient completed and reviewed with me in detail.    Review of [place] Prescription Monitoring Program (): No concern for abuse or misuse of controlled medications based on this report. No .    Pain medications are being prescribed by NA.     Subjective:    Chief Complaint:    Chief Complaint   Patient presents with     Pain       Pain history:  Becky Lay is a 59 year old female who presents for initial evaluation of chief complaint of neck pain and headaches.      She first started having problems with neck pain and headaches in her 20s. States she, \"tweaked her neck easily.\" Insidious onset, without acute precipitating event. Her pain gradually worsened over the years, significantly in 2002. She completed physical therapy without benefit. She was referred to " neurosurgery in 2003. She had a C5-6 discectomy and fusion with Dr. Jose at [facility] in March of 2003. She notes her neck pain improved greatly post operatively, numbness/ache in left arm subsided. Her neck pain has gradually worsened since 2013, states neck pain usually comes in bouts. She was referred to [facility] around 2013, had x1 visit (?) and a cervical MRI completed. She usually manages her flares with ibuprofen, Flexeril, and occasionally Prednisone with good benefit. This most recent flare started in mid July while on vacation, reports the most significant flare in last six years. Her pain initially subsided with Aleve but then returned. She was referred to me for trigger point injections in August, had on 8/23/19. She notes 2 weeks of significant benefit, possibly 2.5 weeks. Pain returned and has persisted. The pain is located at the right base of her neck, often in left side as well. She has a hx of migraines, the pain starts in the back of her head, can be right sided or bilateral and often radiates over head. Describes the pain as an ache, has photophobia and phonophobia with headaches, also has nausea with headaches. Her headaches are equally as painful as her neck pain. Headaches have been nonstop over the last week, usually 15+ a month, lasting for 24 hours or longer. Very rare numbness and tingling in left forearm only. Subjective weakness in left arm since surgery.       Pain description:  Location: neck/head  Quality: aching  Severity/Intensity: 2/10 at best, 9/10 at worst, 5/10 on average  Aggravating factors include: turning head to left or right, up and down  Relieving factors include: ice, tylenol, ibuprofen, Flexeril    The patient otherwise denies bowel or bladder incontinence, parasthesias, weakness, saddle anesthesia, unintentional weight loss, or fever/chills/sweats.     Becky Lay has not been seen at a pain clinic in the past.  x1 trigger point  "injections with me.     Pain Treatments:  (H--helped; HI--Helped initially; [facility]--Somewhat helpful; NH--No help; W--worse; SE--side effects; ?--Unsure if helpful)   1. Medications:       Current pain medications:   Flexeril 10mg TID prn- [facility], usually once a month, this last week every night, not helping now   Ibuprofen 400mg prn- ?   Tylenol 1000mg prn - ?      Current calculated MME: 0    1. Previous Pain Relevant Medications:  NOTE: This medication information taken from patient's intake form, not medical records.    Opiates:  Vicodin- SE, nausea/vomiting, \"no I don't want it\"    NSAIDS: ibuprofen- NH, naproxen- H    Muscle Relaxants: Flexeril- [facility] usually   Anti-migraine mediations: no   Anti-depressants: no   Sleep aids: no   Anxiolytics: no   Neuropathics: no    Topicals: no   Other medications not covered above: Tylenol- ?, Prednisone- H    2. Physical Therapy: yes prior to surgery and after- NH, practices occasionally  3. Pain Psychology: no  4. Surgery:  C5-6 discectomy and fusion with Dr. Jose at [facility] in March of 2003  5. Injections: trigger point injections with me 8/23/19 - H for 2 weeks neck pain and headaches, 2.5 weeks tops  6. Chiropractic: no  7. Acupuncture: no  8. TENS Unit: no    Imaging:  [facility] for [facility] completed today.     Past Medical History:  Past Medical History:   Diagnosis Date     DJD C56,7        Past Surgical History:  Past Surgical History:   Procedure Laterality Date     C VASQUES W/O FACETEC FORAMOT/DSKC 1/2 VRT SEG, CERVICAL  2004    anterior c- 5, 6 fusion       Medications:  Current Outpatient Medications   Medication Sig Dispense Refill     acetaminophen (TYLENOL) 500 MG tablet Take 500-1,000 mg by mouth every 6 hours as needed for mild pain       CITRACAL/VITAMIN D PO 2 a day       estradiol (ESTRACE VAGINAL) 0.1 MG/GM vaginal cream Place 2 g vaginally twice a week 42.5 g 3     ibuprofen (ADVIL/MOTRIN) 200 MG tablet Take 200 mg by mouth every 4 " hours as needed for mild pain       methocarbamol (ROBAXIN) 500 MG tablet Take 1-2 tablets (500-1,000 mg) by mouth 3 times daily as needed for muscle spasms 60 tablet 0       Allergies:     Allergies   Allergen Reactions     No Known Allergies        Social History:  Home situation: lives in a town home  Support system:  and kids  Occupation/Schooling: retired  Tobacco use: no  Drug use: no  Alcohol use: once in while  History of chemical dependency treatment: no  Mental health admissions: no    Family history:  Family History   Problem Relation Age of Onset     Osteoporosis Mother      Lipids Mother      Dementia Mother      Heart Disease Father         MI age 50/BP and Chol Meds     Cancer Maternal Grandmother         colon     Cancer Maternal Grandfather         lung ca     Colon Cancer No family hx of        Review of Systems:    POSTIVE IN BOLD  GENERAL: fever/chills, fatigue, general unwell feeling, weight gain/loss.  HEAD/EYES:  headache, dizziness, or vision changes.    EARS/NOSE/THROAT: nosebleeds, hearing loss, sinus infection, earache, tinnitus.  IMMUNE:  allergies, cancer, immune deficiency, or infections.  SKIN:  itching, rash, hives  HEME/Lymphatic: anemia, easy bruising, easy bleeding.  RESPIRATORY: cough, wheezing, or shortness of breath  CARDIOVASCULAR/Circulation: extremity edema, syncope, hypertension, tachycardia, or angina.  GASTROINTESTINAL: abdominal pain, nausea/emesis, diarrhea, constipation,  hematochezia, or melena.  ENDOCRINE:  diabetes, steroid use,  thyroid disease or osteoporosis.  MUSCULOSKELETAL: joint pain, stiffness, neck pain, back pain, arthritis, or gout.  GENITOURINARY: frequency, urgency, dysuria, difficulty voiding, hematuria or incontinence.  NEUROLOGIC: weakness, numbness, paresthesias, seizure, tremor, stroke or memory loss.  PSYCHIATRIC: depression, anxiety, stress, suicidal thoughts or mood swings.     Objective:    Physical Exam:  Vitals:    09/23/19 1247   BP:  136/85   Pulse: 110   SpO2: 96%     Exam:  Constitutional: Well developed, well nourished, appears stated age.  HEENT: Head atraumatic, normocephalic. Eyes without conjunctival injection or jaundice. Oropharynx clear. Neck supple. No obvious neck masses.  Skin: No rash, lesions, or petechiae of exposed skin.   Extremities: Peripheral pulses intact. No clubbing, cyanosis, or edema.  Psychiatric/mental status: Alert, without lethargy or stupor. Speech fluent. Appropriate affect. Mood normal. Tearful at times. Able to follow commands without difficulty.     Musculoskeletal exam:  Able to walk on the heels and toes without difficulty. Patient does not have an antalgic gait.   Normal bulk and tone. Unremarkable spinal curvature.     Cervical spine:  Range of motion within normal limits.   Tenderness in the cervical paraspinal muscles.Yes  Rotation/ext to right: painful   Rotation/ext to left: painful     Thoracic spine:    Kyphosis. No   Tenderness in the thoracic paraspinal muscles.No    Lumbar spine:    Flex:  90 degrees   Ext: 25 degrees   Tenderness in the lumbar paraspinal muscles.No    Myofascial tenderness:  Cervical paraspinals  Focal tenderness: No SI joint, gluteal, piriformis, or GT tenderness    Neurologic exam:  CN:  Cranial nerves 2-12 are grossly intact  Motor:  5/5 UE and LE strength  Strength:       C4 (shoulder shrug)  symmetric 5/5       C5 (shoulder abduction) symmetric 5/5       C6 (elbow flexion) symmetric 5/5       C7 (elbow extension) symmetric 5/5       C8 (finger abduction, thumb flexion) symmetric 5/5    Reflexes:     Biceps:     R:  2/4 L: 2/4   Brachioradialis   R:  2/4 L: 21/4   Patella:  R:  1/4 L: 1/4   Achilles:  R:  2/4 L: 2/4    Sensory:   Light touch: normal bilateral upper and lower extremities    No allodynia, dysesthesia, or hyperalgesia.    Assessment:  Becky Lay is a 59 year old female with a past medical history significant for hyperhidrosis who presents with complaints of  neck pain.     1. Neck pain- etiology likely combination of facet arthropathy and myofascial pain, s/p C5-6 discectomy and fusion with Dr. Jose at [facility] in March of 2003  2. Mental Health - the patient's mental health concerns, specifically situational depression, affect her experience of pain and contribute to her clinically significant distress.    1. Cervical spondylosis without myelopathy    2. Bilateral occipital neuralgia    3. Myofascial pain    4. Muscle spasm        Plan:  The following recommendations were given to the patient. Diagnosis, treatment options, risks, benefits, and alternatives were discussed, and all questions were answered. The patient expressed understanding of the plan for management.     I am recommending a multidisciplinary treatment plan to help this patient better manage her pain.  This includes:      1.  Pain Physical Therapy:  YES      Discussed pain physical therapy today, would highly recommend and she is interested. Notes previous physical therapy has been too aggressive. Schedule first visit with Yoselin Duarte PT and try to have 2-3 sessions prior to next visit.    2.  Pain Psychologist to address relaxation, behavioral change, coping style, and other factors important to improvement.  YES     Discussed pain psychology today, would also recommend. Schedule first visit with Meg Hammer PsyD, LP.    3.  Diagnostic Studies: no cervical MRI available for review. Request for records from [facility] completed today.    4.  Medication Management:     1. Becky would prefer not to start a daily medication if possible. Flexeril was previously helpful but less so recently. We will discontinue Flexeril and start Robaxin. Take 1-2 tablets, up to three times daily as needed for muscle spasm/tightness.     2. Discussed TCA antidepessants or gabapentin as options to start next. She would like to hold off for now but would consider.      3. Can continue  ibuprofen and Tylenol.    5.  Potential procedures: trigger point injections completed today. If not helpful, could consider an occipital nerve block. If this isn't helpful, could consider TON, C3,4 medial branch blocks. Briefly discussed these options today.    6.  Consider the purchase of a Theracane.     7.  Follow up with NANCY Henderson CNP in 4 weeks.    Review of Electronic Chart: Today I have also reviewed available medical information in the patient's medical record at [facility] ([facility]), including relevant provider notes, laboratory work, and imaging.       I spent 60 minutes of time face to face with the patient.  Greater than 50% of this time was spent in patient counseling and/or coordination of care regarding principles of multidisciplinary care, medication management, and treatment options as discussed above.      NANCY Henderson CNP  [facility]    [facility] - Procedure Note    Date of Visit: 9/23/2019    Pre procedure Diagnosis: myofascial pain/myositis 60.9   Post procedure Diagnosis: Same  Procedure performed: trigger point injections  Complications: none  Operators: Jacquelyn CRUZ CNP    /85   Pulse 110   LMP 06/20/2008   SpO2 96%     Indications:   Becky Lay is a 59 year old female with a history of neck pain.  Exam shows myofascial pain of the muscle groups listed below and they have tried conservative treatment including PT and medications.    Options/alternatives, benefits and risks were discussed with the patient including bleeding, infection, tissue trauma and pnuemothorax.  Questions were answered to her satisfaction and she agrees to proceed. Voluntary informed consent was obtained and signed.     Vitals allergies and medications were reviewed.    This is a repeat injection. Previous TPI on 8/23/19 was helpful for 2.5 weeks.     Procedure:  After getting informed consent, with review of additional risks with steroid, ella  Pause for the Cause was performed.    Trigger points were identified by patient, and marked when appropriate.  The area was prepped with Chloroprep.    Using clean technique, injections were completed using a 25G, 1.5 inch needle.  After negative aspiration, injection was completed.  A total of 6 locations were injected.  When possible, tissue was retracted from the chest wall to avoid lung injury.    Muscle groups injected:  Bilateral trapezius, splenius capitis, levator scapulae       Injection solution contained:  9ml of 0.5% bupivacaine & 20mg kenolog.    Hemostasis was achieved, the area was cleaned, and bandaids were placed when appropriate.  The patient tolerated the procedure well.  Respirations appeared equal and unlabored.     Follow-up includes:   -f/u with the referring provider  -repeat as needed      Jacquelyn CRUZ [facility] [place]

## 2019-09-23 ENCOUNTER — OFFICE VISIT (OUTPATIENT)
Dept: PALLIATIVE MEDICINE | Facility: CLINIC | Age: 59
End: 2019-09-23
Attending: NURSE PRACTITIONER
Payer: COMMERCIAL

## 2019-09-23 VITALS — OXYGEN SATURATION: 96 % | DIASTOLIC BLOOD PRESSURE: 85 MMHG | HEART RATE: 110 BPM | SYSTOLIC BLOOD PRESSURE: 136 MMHG

## 2019-09-23 DIAGNOSIS — M79.18 MYOFASCIAL PAIN: ICD-10-CM

## 2019-09-23 DIAGNOSIS — M62.838 MUSCLE SPASM: ICD-10-CM

## 2019-09-23 DIAGNOSIS — M54.81 BILATERAL OCCIPITAL NEURALGIA: ICD-10-CM

## 2019-09-23 DIAGNOSIS — M47.812 CERVICAL SPONDYLOSIS WITHOUT MYELOPATHY: Primary | ICD-10-CM

## 2019-09-23 PROCEDURE — 99215 OFFICE O/P EST HI 40 MIN: CPT | Mod: 25 | Performed by: NURSE PRACTITIONER

## 2019-09-23 PROCEDURE — 20553 NJX 1/MLT TRIGGER POINTS 3/>: CPT | Performed by: NURSE PRACTITIONER

## 2019-09-23 RX ORDER — METHOCARBAMOL 500 MG/1
500-1000 TABLET, FILM COATED ORAL 3 TIMES DAILY PRN
Qty: 60 TABLET | Refills: 0 | Status: SHIPPED | OUTPATIENT
Start: 2019-09-23 | End: 2019-12-27

## 2019-09-23 RX ORDER — ACETAMINOPHEN 500 MG
500-1000 TABLET ORAL EVERY 6 HOURS PRN
COMMUNITY

## 2019-09-23 RX ORDER — IBUPROFEN 200 MG
200 TABLET ORAL EVERY 4 HOURS PRN
COMMUNITY
End: 2024-05-16

## 2019-09-23 ASSESSMENT — PAIN SCALES - GENERAL: PAINLEVEL: EXTREME PAIN (8)

## 2019-09-24 LAB
COPATH REPORT: NORMAL
PAP: NORMAL

## 2019-09-25 ENCOUNTER — HOSPITAL ENCOUNTER (EMERGENCY)
Facility: CLINIC | Age: 59
Discharge: HOME | End: 2019-09-25
Attending: EMERGENCY MEDICINE | Admitting: EMERGENCY MEDICINE
Payer: COMMERCIAL

## 2019-09-25 VITALS
DIASTOLIC BLOOD PRESSURE: 85 MMHG | HEIGHT: 64 IN | HEART RATE: 82 BPM | BODY MASS INDEX: 22.2 KG/M2 | SYSTOLIC BLOOD PRESSURE: 124 MMHG | WEIGHT: 130 LBS | TEMPERATURE: 98.1 F | RESPIRATION RATE: 16 BRPM | OXYGEN SATURATION: 98 %

## 2019-09-25 DIAGNOSIS — R51.9 ACUTE NONINTRACTABLE HEADACHE, UNSPECIFIED HEADACHE TYPE: ICD-10-CM

## 2019-09-25 LAB
FINAL DIAGNOSIS: NORMAL
HPV HR 12 DNA CVX QL NAA+PROBE: NEGATIVE
HPV16 DNA SPEC QL NAA+PROBE: NEGATIVE
HPV18 DNA SPEC QL NAA+PROBE: NEGATIVE
SPECIMEN DESCRIPTION: NORMAL
SPECIMEN SOURCE CVX/VAG CYTO: NORMAL

## 2019-09-25 PROCEDURE — 99284 EMERGENCY DEPT VISIT MOD MDM: CPT | Mod: 25

## 2019-09-25 PROCEDURE — 25000128 H RX IP 250 OP 636: Performed by: EMERGENCY MEDICINE

## 2019-09-25 PROCEDURE — 96376 TX/PRO/DX INJ SAME DRUG ADON: CPT

## 2019-09-25 PROCEDURE — 96374 THER/PROPH/DIAG INJ IV PUSH: CPT

## 2019-09-25 PROCEDURE — 96361 HYDRATE IV INFUSION ADD-ON: CPT

## 2019-09-25 PROCEDURE — 96375 TX/PRO/DX INJ NEW DRUG ADDON: CPT

## 2019-09-25 RX ORDER — DIPHENHYDRAMINE HYDROCHLORIDE 50 MG/ML
25 INJECTION INTRAMUSCULAR; INTRAVENOUS ONCE
Status: COMPLETED | OUTPATIENT
Start: 2019-09-25 | End: 2019-09-25

## 2019-09-25 RX ORDER — DEXAMETHASONE SODIUM PHOSPHATE 10 MG/ML
10 INJECTION, SOLUTION INTRAMUSCULAR; INTRAVENOUS ONCE
Status: COMPLETED | OUTPATIENT
Start: 2019-09-25 | End: 2019-09-25

## 2019-09-25 RX ORDER — KETOROLAC TROMETHAMINE 15 MG/ML
15 INJECTION, SOLUTION INTRAMUSCULAR; INTRAVENOUS ONCE
Status: COMPLETED | OUTPATIENT
Start: 2019-09-25 | End: 2019-09-25

## 2019-09-25 RX ORDER — SODIUM CHLORIDE 9 MG/ML
1000 INJECTION, SOLUTION INTRAVENOUS CONTINUOUS
Status: DISCONTINUED | OUTPATIENT
Start: 2019-09-25 | End: 2019-09-25 | Stop reason: HOSPADM

## 2019-09-25 RX ORDER — DIPHENHYDRAMINE HCL 25 MG
25 TABLET ORAL 3 TIMES DAILY PRN
Qty: 15 TABLET | Refills: 0 | Status: SHIPPED | OUTPATIENT
Start: 2019-09-25 | End: 2019-12-27

## 2019-09-25 RX ORDER — METOCLOPRAMIDE 10 MG/1
10 TABLET ORAL 3 TIMES DAILY PRN
Qty: 12 TABLET | Refills: 0 | Status: SHIPPED | OUTPATIENT
Start: 2019-09-25 | End: 2019-12-27

## 2019-09-25 RX ADMIN — SODIUM CHLORIDE 1000 ML: 9 INJECTION, SOLUTION INTRAVENOUS at 10:49

## 2019-09-25 RX ADMIN — KETOROLAC TROMETHAMINE 15 MG: 15 INJECTION, SOLUTION INTRAMUSCULAR; INTRAVENOUS at 11:52

## 2019-09-25 RX ADMIN — DEXAMETHASONE SODIUM PHOSPHATE 10 MG: 10 INJECTION, SOLUTION INTRAMUSCULAR; INTRAVENOUS at 10:53

## 2019-09-25 RX ADMIN — DIPHENHYDRAMINE HYDROCHLORIDE 25 MG: 50 INJECTION, SOLUTION INTRAMUSCULAR; INTRAVENOUS at 11:20

## 2019-09-25 RX ADMIN — DIPHENHYDRAMINE HYDROCHLORIDE 25 MG: 50 INJECTION, SOLUTION INTRAMUSCULAR; INTRAVENOUS at 10:50

## 2019-09-25 RX ADMIN — PROCHLORPERAZINE EDISYLATE 10 MG: 5 INJECTION, SOLUTION INTRAMUSCULAR; INTRAVENOUS at 10:57

## 2019-09-25 ASSESSMENT — ENCOUNTER SYMPTOMS
FEVER: 1
NAUSEA: 1
HEADACHES: 1
NUMBNESS: 0
WEAKNESS: 0
NECK PAIN: 1
CHILLS: 1
PHOTOPHOBIA: 1
APPETITE CHANGE: 1

## 2019-09-25 ASSESSMENT — MIFFLIN-ST. JEOR: SCORE: 1139.68

## 2019-09-25 NOTE — ED TRIAGE NOTES
"ABCs intact. Pt c/o headaches/neck pain since July. Pt has been going to a pain management clinic. Pt c/o headache ongoing \"for weeks\", no relief. Pt had a steroid injection on Monday. Pt took tylenol 1000mg about 0730 this morning.   "

## 2019-09-25 NOTE — ED PROVIDER NOTES
History     Chief Complaint:  Headache      The history is provided by the patient, the spouse and medical records.      Becky Lay is a 59 year old female with a history of degenerative joint disease C5, 6 and 7 with chronic headaches and neck pain s/p C5-6 discectomy and fusion (2003 by Dr. Jose) who presents with a headache.  She had previously been seen by neurosurgery in 2013 with cervical MRI completed (not available in Epic). She had acute worsening of her pain in July, continuing to worsen into August. Becky has flare ups about once a year -- she does have extended periods without headaches. For her current flare, she received trigger point injections in August with 2.5 weeks of alleviation of her pain. Patient was most recently seen by pain management in an intake appointment 9/23 for chronic headaches and neck pain. She was given trigger point injections again 9/23, referred to pain PT, pain psychology, and started on Robaxin. Becky comes into the emergency department today for worsening headache and neck pain. She states that the trigger point injection she most recently received has not alleviated any pain whatsoever. Her  states that her current episode of headaches is the most debilitating that they have been. There has been nothing that alleviates her pain since she received the trigger point injection. The neck pain occurs bilaterally, shifting between sides. In addition, she endorses decreased appetite, nausea, photophobia, subjective fever last night, and chills. Denies numbness or weakness.     Allergies:  No Known Drug Allergies      Medications:    Estradiol   Robaxin     Past Medical History:    Degenerative joint disease C5, 6, and 7  Generalized hyperhidrosis     Past Surgical History:    Tim w/o Facetec foramot/dskc 1/2 VRT seg, cervical     Family History:    Mother - osteoporosis, lipids, dementia  Father - myocardial infarction, hypertension, hyperlipidemia      Social  "History:  The patient was accompanied to the ED by her .  Smoking Status: Never  Smokeless Tobacco: Never  Alcohol Use: Yes   Marital Status:        Review of Systems   Constitutional: Positive for appetite change, chills and fever.   Eyes: Positive for photophobia.   Gastrointestinal: Positive for nausea.   Musculoskeletal: Positive for neck pain.   Neurological: Positive for headaches. Negative for weakness and numbness.   All other systems reviewed and are negative.    Physical Exam     Patient Vitals for the past 24 hrs:   BP Temp Temp src Pulse Resp SpO2 Height Weight   09/25/19 0925 106/85 98.1  F (36.7  C) Temporal 114 18 97 % 1.626 m (5' 4\") 59 kg (130 lb)      Physical Exam  VS: Reviewed per above  HENT: Mucous membranes moist, no nuchal rigidity. TTP at skull base where paraspinal cervical musculature inserts BL  EYES: sclera anicteric  CV: Rate as noted,  regular rhythm.   RESP: Effort normal. Breath sounds are normal bilaterally.  GI: tenderness/rebound/guarding, not distended.  NEURO: GCS 15, cranial nerves II through XII are intact, 5 out of 5 strength in all 4 extremities, sensation is intact light touch in all 4 extremities  MSK: No deformity of the extremities  SKIN: Warm and dry    Emergency Department Course     Procedures:  None     Interventions:  1049 - NS Bolus 1,000mL IV   1050 - Benadryl 25mg IV   1053 - Decadron 10mg IV   1057 - Compazine 10mg IV   1120 - Benadryl 25mg IV   1152 - Toradol 15mg IV      Emergency Department Course:  Past medical records, nursing notes, and vitals reviewed.    1029: I performed an exam of the patient as documented above.     1226: Patient rechecked and updated.      Findings and plan explained to the Patient and spouse. Patient discharged home with instructions regarding supportive care, medications, and reasons to return. The importance of close follow-up was reviewed. The patient was prescribed Benadryl and Reglan.     Impression & Plan " "    Medical Decision Making:  Patient presents to the ER for evaluation of skull base/upper neck pain.  Unfortunately Becky has been dealing with this discomfort for the past few months.  She has a history of similar headache/pain dating back to 2013 when she was seen in the ER and had negative CT of the head and negative CTA of the head and neck.  She most recently had been seen in pain clinic a few days ago for trigger point injections which only provided transient relief.  Patient has not had new numbness or weakness in the extremities. No vision changes. She describes nausea and photophobia but no ataxia. Based on my exam and hx, I do not see signs of neuro deficits associated with this HA to suggest occult stroke, dissection, increased ICP secondary to processes such as tumor, intracranial hemorrhage, nor venous sinus thrombosis.  Given that she does have some transient relief with trigger point injections and has tenderness localized over the paraspinal cervical musculature near the insertion of the skull, it seems to have an alternate etiology for her headaches.  There is no fever or nuchal rigidity or history to suggest occult meningitis either.  Patient and  were interested in obtaining CNS imaging today.  I explained that based on my exam and assessment, I have a low suspicion for occult aforementioned sinister intracranial processes.  I did recommend a follow-up with neurology to discuss ongoing management of headaches and determine if they felt that additional imaging would be indicated.  After IV medications per above, her headache had \"been the best that it had been over the past few days\".  I placed a referral to the [place] [facility].  Close return precautions discussed prior to discharge.    Discharge Diagnosis:    ICD-10-CM    1. Acute nonintractable headache, unspecified headache type R51        Disposition:  Discharged to home.     Discharge Medications:  New Prescriptions    " DIPHENHYDRAMINE (BENADRYL) 25 MG TABLET    Take 1 tablet (25 mg) by mouth 3 times daily as needed (take with reglan for headache)    METOCLOPRAMIDE (REGLAN) 10 MG TABLET    Take 1 tablet (10 mg) by mouth 3 times daily as needed (take with benadryl for headache)       Scribe Disclosure:  I, Jennifer Cueto, am serving as a scribe at 10:16 AM on 9/25/2019 to document services personally performed by Emanuel Pardo MD based on my observations and the provider's statements to me.     Jennifer Cueto  9/25/2019   [facility] EMERGENCY DEPARTMENT       Emanuel Pardo MD  09/25/19 5481

## 2019-09-25 NOTE — ED AVS SNAPSHOT
[facility] Emergency Department  [address]  [facility] [address]  Phone:  [phone]  Fax:  [phone]                                    Becky Lay   MRN: [id number]    Department:  [facility] Emergency Department   Date of Visit:  9/25/2019           After Visit Summary Signature Page    I have received my discharge instructions, and my questions have been answered. I have discussed any challenges I see with this plan with the nurse or doctor.    ..........................................................................................................................................  Patient/Patient Representative Signature      ..........................................................................................................................................  Patient Representative Print Name and Relationship to Patient    ..................................................               ................................................  Date                                   Time    ..........................................................................................................................................  Reviewed by Signature/Title    ...................................................              ..............................................  Date                                               Time          22EPIC Rev 08/18

## 2019-09-30 ENCOUNTER — HEALTH MAINTENANCE LETTER (OUTPATIENT)
Age: 59
End: 2019-09-30

## 2019-09-30 ENCOUNTER — MYC MEDICAL ADVICE (OUTPATIENT)
Dept: PALLIATIVE MEDICINE | Facility: CLINIC | Age: 59
End: 2019-09-30

## 2019-10-23 ENCOUNTER — TRANSFERRED RECORDS (OUTPATIENT)
Dept: HEALTH INFORMATION MANAGEMENT | Facility: CLINIC | Age: 59
End: 2019-10-23

## 2019-10-24 NOTE — TELEPHONE ENCOUNTER
Moy message from patient on 10/23 at 1142:    Brodie Valladares,   Since seeing you last I was in the ER on 9/25 for a severe headache.  After receiving a IV headache cocktail I was discharged with a recommendation to continue with the Pain Clinic and to also see a Neurologist.  I was seen by Dr. Asa Encinas at [facility] this morning and he recommended a MRI scan and also to continue working with the Pain Clinic.  Two of his recommendations were to have an occipital block and to have PT.  I did cancel my psych and PT appointments scheduled for 9/26 as I was in the ER and wasn't sure I would be up to going to the appointments the next day. My plan is to reschedule with you, psych and PT.   Becky Lay   ------------  Will route to Jacquelyn Nj CNP for review.     Venecia Gallardo, DARLINGN, RN-BC  Patient Care Supervisor  [facility] [place]

## 2019-10-25 NOTE — TELEPHONE ENCOUNTER
Thank you, information noted. It sounds like she has a good plan in place. Fine to reschedule, please advise her to be careful with physical therapy- insurance only covers one form at a time.     NANCY Henderson [facility] [place]

## 2019-10-25 NOTE — TELEPHONE ENCOUNTER
Will leave encounter open for patient response/chart review by nursing.     Francheskat below sent to patient    Benny MontenegroJacquelyn was able to review your messages. She thought that it was a good plan and that you can go ahead and reschedule for appointments.  However, if you are going to do a traditional physical therapy and also pain physical therapy, I would suggest checking with your insurance to make sure you will be covered for both at the same time. Thanks!    Alyssa FLOWERS, RN Care Coordinator  [facility]  Pain Management

## 2019-10-28 ENCOUNTER — OFFICE VISIT (OUTPATIENT)
Dept: PALLIATIVE MEDICINE | Facility: CLINIC | Age: 59
End: 2019-10-28
Payer: COMMERCIAL

## 2019-10-28 DIAGNOSIS — M47.812 CERVICAL SPONDYLOSIS WITHOUT MYELOPATHY: Primary | ICD-10-CM

## 2019-10-28 DIAGNOSIS — M54.81 BILATERAL OCCIPITAL NEURALGIA: ICD-10-CM

## 2019-10-28 DIAGNOSIS — M79.18 MYOFASCIAL PAIN: ICD-10-CM

## 2019-10-28 PROCEDURE — 97162 PT EVAL MOD COMPLEX 30 MIN: CPT | Mod: GP | Performed by: PHYSICAL THERAPIST

## 2019-10-28 PROCEDURE — 97530 THERAPEUTIC ACTIVITIES: CPT | Mod: GP | Performed by: PHYSICAL THERAPIST

## 2019-10-28 NOTE — PROGRESS NOTES
[facility]     Date of visit: 10/29/2019    Chief complaint:   Chief Complaint   Patient presents with     Pain       Interval history:  Becky Lay is a 59 year old female last seen by me on 9/23/19.      Recommendations/plan at the last visit included:              1.  Pain Physical Therapy:  YES                 Discussed pain physical therapy today, would highly recommend and she is interested. Notes previous physical therapy has been too aggressive. Schedule first visit with Yoselin Duarte PT and try to have 2-3 sessions prior to next visit.               2.  Pain Psychologist to address relaxation, behavioral change, coping style, and other factors important to improvement.  YES                Discussed pain psychology today, would also recommend. Schedule first visit with Meg Hammer PsyD, LP.               3.  Diagnostic Studies: no cervical MRI available for review. Request for records from [facility] completed today.               4.  Medication Management:                           1. Becky would prefer not to start a daily medication if possible. Flexeril was previously helpful but less so recently. We will discontinue Flexeril and start Robaxin. Take 1-2 tablets, up to three times daily as needed for muscle spasm/tightness.                           2. Discussed TCA antidepessants or gabapentin as options to start next. She would like to hold off for now but would consider.                            3. Can continue ibuprofen and Tylenol.               5.  Potential procedures: trigger point injections completed today. If not helpful, could consider an occipital nerve block. If this isn't helpful, could consider TON, C3,4 medial branch blocks. Briefly discussed these options today.               6.  Consider the purchase of a Theracane.                7.  Follow up with NANCY Henderson CNP in 4 weeks.    Since her last visit, Becky Lay  "reports:  -Her pain is much better than it was at last visit.   -She had trigger point injections with me on 9/23/19. States she did have some improvement in neck pain, reports, \"some loosening of the neck.\"   -Unfortunately her headache did not improve after TPI. She went into the ED on 9/25/19 for evaluation. She was given a headache cocktail with improvement. She was given Benadryl and Reglan to take prn.  -She states that she thinks it was a combination of factors that contributed to the problematic headache. States she did not sleep over the previous weekend, took care of her three young grandchildren for three days, and already had an ongoing headache prior to this..   -She tried Robaxin in place of Flexeril. She finds these medications to be comparably effective but Robaxin is less sedating. She is not interested in adding on more medications at this time.   -She had a visit with Yoselin Duarte PT yesterday, held off on starting because of everything else going on. She plans to continue, has follow up next week. Starts pain psychology next week as well.   -She had a visit with [facility], Dr. Encinas. He did not think that her headaches were typical of migraines, more suggestive of tension headaches and headaches associated with neck pathology. He recommend a cervical MRI, pain physical therapy, and consideration of an occipital nerve block . He also suggested consideration of cervical medial branch block to radiofrequency ablation if not helpful.   -She had her cervical MRI completed this morning.       Pain Information:   Pain quality: aching and tiring    Pain timing: constant     Pain rating: intensity ranges from 2/10 to 6/10, and averages 4/10 on a 0-10 scale.   Aggravating factors include: turning head   Relieving factors include: medicine, ice    Current pain medications:               Robaxin 500-1000mg prn- [facility], less sedating than Flexeril, most days 1-3 tabs/day              " "Ibuprofen 400mg BID or TID- [facility], tried taking 800mg at a time with comparable benefit              Tylenol 1000mg prn - ?    Current MME: 0    Review of [place] Prescription Monitoring Program (): No concern for abuse or misuse of controlled medications based on this report.     Annual Controlled Substance Agreement/UDS due date: NA    Past pain treatments:  1. Previous Pain Relevant Medications:  NOTE: This medication information taken from patient's intake form, not medical records.               Opiates:  Vicodin- [facility], nausea/vomiting, \"no I don't want it\"               NSAIDS: ibuprofen- NH, naproxen- H              Muscle Relaxants: Flexeril- [facility] usually              Anti-migraine mediations: no              Anti-depressants: no              Sleep aids: no              Anxiolytics: no              Neuropathics: no                       Topicals: no              Other medications not covered above: Tylenol- ?, Prednisone- H     2. Physical Therapy: yes prior to surgery and after- NH, practices occasionally  3. Pain Psychology: no  4. Surgery:  C5-6 discectomy and fusion with Dr. Jose at [facility] in March of 2003  5. Injections: trigger point injections with me on 9/23/19- [facility] for neck pain  trigger point injections with me 8/23/19 - H for 2 weeks neck pain and headaches, 2.5 weeks tops  6. Chiropractic: no  7. Acupuncture: no  8. TENS Unit: no    Medications:  Current Outpatient Medications   Medication Sig Dispense Refill     acetaminophen (TYLENOL) 500 MG tablet Take 500-1,000 mg by mouth every 6 hours as needed for mild pain       CITRACAL/VITAMIN D PO 2 a day       ibuprofen (ADVIL/MOTRIN) 200 MG tablet Take 200 mg by mouth every 4 hours as needed for mild pain       methocarbamol (ROBAXIN) 500 MG tablet Take 1-2 tablets (500-1,000 mg) by mouth 3 times daily as needed for muscle spasms 60 tablet 0     diphenhydrAMINE (BENADRYL) 25 MG tablet Take 1 tablet (25 mg) by mouth 3 times daily as " needed (take with reglan for headache) 15 tablet 0     metoclopramide (REGLAN) 10 MG tablet Take 1 tablet (10 mg) by mouth 3 times daily as needed (take with benadryl for headache) 12 tablet 0       Medical History: any changes in medical history since they were last seen? No    Review of Systems:  The 14 system ROS was reviewed from the intake questionnaire, and is positive for: neck pain, joint pain, stiffness  Any bowel or bladder problems: denies  Mood: denies depression or anxiety    Physical Exam:  Blood pressure 127/82, pulse 67, last menstrual period 06/20/2008, SpO2 100 %, not currently breastfeeding.  General: A&O x4, no signs of distress. Well groomed.   Gait: Normal  Neuro exam: 5/5 upper and lower extremity strength.    Imaging:  Had cervical MRI completed today, awaiting records.     Assessment:   Becky Lay is a 59 year old female with a past medical history significant for hyperhidrosis who presents with complaints of neck pain.      1. Neck pain- etiology likely combination of facet arthropathy and occipital neuralgia, s/p C5-6 discectomy and fusion with Dr. Jose at [facility] in March of 2003  2. Mental Health - the patient's mental health concerns, specifically situational depression, affect her experience of pain and contribute to her clinically significant distress.     1. Bilateral occipital neuralgia    2. Cervical spondylosis without myelopathy        Plan:     1.  Pain Physical Therapy:     YES  Has had x1 visit with pain physical therapy so far, unsure of if benefit. Recommend she continue visits with Yoselin Duarte PT as able.     2.  Pain Psychologist to address relaxation, behavioral change, coping style, and other factors important to improvement.     YES  Go to  First visit with Meg Hammer PsyD, LP as planned.    3.  Medication Management:     1. Continue ibuprofen and Robaxin as needed.    2. Becky Montenegro is not interested in starting a daily preventative medication at  this time. She will let me know if interested.   4.  Potential procedures: it sounds at though her neck pain and headaches may be due to a combination of occipital neuralgia and neck pathology. We will start with bilateral occipital nerve blocks, ordered today. Hopefully this will be of great benefit. If not, would recommend cervical medial branch block to radiofrequency ablation (likely TON, C3,4 but will review results of cervical MRI first). She will call if not having benefit about 2 weeks after occipital nerve block.     5.  Continue to work with Dr. Encinas with [facility]. She will request cervical MRI be sent to our clinic.    6.  Follow up with NANCY Henderson CNP in 6 weeks.       I spent 30 minutes of time face to face with the patient.  Greater than 50% of this time was spent in patient counseling and/or coordination of care.    Jacquelyn CRUZ CNP  [facility]

## 2019-10-28 NOTE — PROGRESS NOTES
"PHYSICAL THERAPY INITIAL EVALUATION and PLAN OF CARE    Patient Name: Becky Lay     : 1960    MRN: [id number]   Pain Management Provider:  NANCY Singletary CNP    Diagnosis:    Cervical spondylosis without myelopathy  Bilateral occipital neuralgia  Myofascial pain    SUBJECTIVE:    PRESENTATION AND ETIOLOGY    Chief Complaint: headache pain that starts in occipital area; started having problems with HA pain in 2019; diagnosed with cervicogenic HA; HA's occur daily; pain and muscle tension vary in intensity  TPI in August helped to relax neck muscles and lasted a few weeks    She first started having problems with neck pain and headaches in her 20s. States she, \"tweaked her neck easily.\" Insidious onset, without acute precipitating event. Her pain gradually worsened over the years, significantly in . She completed physical therapy without benefit. She was referred to neurosurgery in . She had a C5-6 discectomy and fusion with Dr. Jose at [facility] in 2003. She notes her neck pain improved greatly post operatively, numbness/ache in left arm subsided. Her neck pain has gradually worsened since , states neck pain usually comes in bouts. She was referred to [place] [facility] around , had x1 visit (?) and a cervical MRI completed. She usually manages her flares with ibuprofen, Flexeril, and occasionally Prednisone with good benefit. This most recent flare started in mid July while on vacation, reports the most significant flare in last six years. Her pain initially subsided with Aleve but then returned. She was referred to me for trigger point injections in August, had on 19. She notes 2 weeks of significant benefit, possibly 2.5 weeks. Pain returned and has persisted. The pain is located at the right base of her neck, often in left side as well. She has a hx of migraines, the pain starts in the back of her head, can be right sided or bilateral " and often radiates over head. Describes the pain as an ache, has photophobia and phonophobia with headaches, also has nausea with headaches. Her headaches are equally as painful as her neck pain. Headaches have been nonstop over the last week, usually 15+ a month, lasting for 24 hours or longer. Very rare numbness and tingling in left forearm only. Subjective weakness in left arm since surgery.      Pain description:  Location: neck/head  Quality: aching  Severity/Intensity: 2/10 at best, 9/10 at worst, 5/10 on average  Aggravating factors include: turning head to left or right, up and down  Relieving factors include: ice, tylenol, ibuprofen, Flexeril  LEVEL OF FUNCTION AT START OF CARE  Walking tolerance: next  Sitting tolerance: next  Standing tolerance: next  Housework tolerance: next  Sleep: next  CURRENT / PREVIOUS INTERVENTION(S):   Previous / Current therapies for current chief complaint:   2. Physical Therapy: yes prior to surgery and after- NH, practices occasionally  3. Pain Psychology: no  4. Surgery:  C5-6 discectomy and fusion with Dr. Jose at [facility] in March of 2003  5. Injections: trigger point injections with me 8/23/19 - H for 2 weeks neck pain and headaches, 2.5 weeks tops  6. Chiropractic: no  7. Acupuncture: no  8. TENS Unit: no  DEMOGRAPHICS  Employment Status: Retired school nurse  Social Support: lives in town house with   Pertinent Medical  / Surgical History: Epic Snapshot Reviewed, See provider's note: Neck pain- etiology likely combination of facet arthropathy and myofascial pain, s/p C5-6 discectomy and fusion March of 2003    Patient's goals for physical therapy: to be headache free; be able to read and go dancing; be able to enjoy spending time (taking care x 10 hours once per week) with grandchildren ages 4 and 2 yrs old    ===============================================================  OBJECTIVE:  POSTURE:  Observation: Patient demonstrates forward head,  protracted shoulders and thoracic kyphosis in standing and sitting posture.   Noted reduced lumbar lordosis and posterior weight shift in standing.  Poor kinesthetic/body awareness  GAIT, LOCOMOTION, and BALANCE:  Gait and Locomotion: normal velocity; impaired arm swing R>L  RANGE OF MOTION:   Cervical: limited to 75% WFL all directions  Lumbar: limited to 75% WFL right SB, limited 50% left SB mostly upper ribcage/thoracic spine  Shoulders: WFL  Hips: next  MUSCLE PERFORMANCE:   Strength: demonstrates core and scapular weakness  Flexibility: tightness noted in upper traps, cervical paraspinals, suboccipitals L > R  FUNCTIONAL TESTING/OBSERVATION: independent chair mobility; not able to turn head easily so repositions with chair  Pain behaviors: None  ===============================================================  Today's Treatment:  Initial evaluation  Therapeutic Activity:  For 30 minutes including Pt educated on the concept of the nervous system as a hypersensitive and hyperactive alarm system and the role of physical therapy in desensitizing the nerves and reducing pain.  Issued self cares handout, continue next  Focus next session: self cares, body scan, SL left shoulder glides  ===============================================================  ASSESSMENT:  Physical Therapy Diagnosis:Impaired Posture and Impaired Muscle Performance    Patient requires PT intervention for the following impairments: Limited knowledge of condition and / or self care - inability to control symptoms, Impaired functional mobility, Impaired gait / weight bearing tolerance, Impaired posture / muscle imbalance, Pain, ROM limitations and Deconditioning    Anticipated Goals and Expected Outcomes:  8 weeks  Patient will report the use of 2 self care practices during their day.  Patient will report the participation in 20 minutes of aerobic activity daily and practicing stretching daily.  Patient will demonstrate the ability to find core  strength in neutral posture.  Patient will demonstrate the ability to relax muscle group before stretching.  16 weeks  Patient will be independent with a home exercise program.  Patient will be independent with posture correction.  Patient will report independence with a self care/flare management program.  Patient will demonstrate improved functional strength and endurance as reports by increased tolerance for IADLs and more consistent participation in daily activity.     Rehab potential for achieving goals: good.    ===============================================================  PLAN:   Patient will benefit from skilled physical therapy consisting of:  neuromuscular reeducation of: kinesthetic sense and posture for sitting and/or standing activities, education in self care / home management training to include instruction in: symptom control techniques, therapeutic activities to achieve improved functional performance in: daily actvities and therapeutic exercise to develop: strength and endurance, flexibility and core stability    Assessment will be ongoing with changes in treatment as indicated.  Benefits/risks/alternatives to treatment have been reviewed and the patient has been instructed to contact this office if they have any questions or concerns.  This plan of care has been discussed with the patient and the patient is in agreement.     Frequency / Duration:  Patient will be seen for a total of 4-6 visits; 12 weeks    Total Visit Time: 45  minutes            Yoselin Duarte, PT                                  Date:  10/28/2019      =====================================================  **  Referring Provider Certification: Referring Provider reviewing certifies that the above treatment / plan of care is required and authorized, and that the patient's plan will be reviewed every thirty (30) days **.   ======================================================     PRESENT:  NA    MULTIDISCIPLINARY  PATIENT / FAMILY EDUCATION RECORD  Department:  Physical Therapy    Readiness to Learn: Ability to understand verbal instructions, Ability to understand written instructions, Knowledge of educational needs / treatment plan  Specific Barriers to Learning: None  Referrals: None  Learning Needs: Rehabilitation techniques to improve functional independence Pain management education to improve daily activity tolerance.  Who: Patient  How: Demonstration, Verbal instructions, Written instructions  Response: Appropriate verbal response, Asked questions, Demonstrated ability, Verbalized recall / understanding

## 2019-10-29 ENCOUNTER — TRANSFERRED RECORDS (OUTPATIENT)
Dept: HEALTH INFORMATION MANAGEMENT | Facility: CLINIC | Age: 59
End: 2019-10-29

## 2019-10-29 ENCOUNTER — OFFICE VISIT (OUTPATIENT)
Dept: PALLIATIVE MEDICINE | Facility: CLINIC | Age: 59
End: 2019-10-29
Payer: COMMERCIAL

## 2019-10-29 VITALS — SYSTOLIC BLOOD PRESSURE: 127 MMHG | DIASTOLIC BLOOD PRESSURE: 82 MMHG | HEART RATE: 67 BPM | OXYGEN SATURATION: 100 %

## 2019-10-29 DIAGNOSIS — M54.81 BILATERAL OCCIPITAL NEURALGIA: Primary | ICD-10-CM

## 2019-10-29 DIAGNOSIS — M47.812 CERVICAL SPONDYLOSIS WITHOUT MYELOPATHY: ICD-10-CM

## 2019-10-29 PROCEDURE — 99214 OFFICE O/P EST MOD 30 MIN: CPT | Performed by: NURSE PRACTITIONER

## 2019-10-29 SDOH — HEALTH STABILITY: MENTAL HEALTH: HOW OFTEN DO YOU HAVE A DRINK CONTAINING ALCOHOL?: 2-4 TIMES A MONTH

## 2019-10-29 ASSESSMENT — PAIN SCALES - GENERAL: PAINLEVEL: MILD PAIN (2)

## 2019-10-29 NOTE — PATIENT INSTRUCTIONS
1.  Pain Physical Therapy:     YES  Continue visits with Yoselin Duarte PT as recommended.    2.  Pain Psychologist to address relaxation, behavioral change, coping style, and other factors important to improvement.     YES  Go to visit with Meg Hammer PsyD, LP as planned.    3.  Medication Management:     1. Continue ibuprofen as needed.     2. Continue Robaxin as needed. Let me know if interested in taking a daily preventative medication.   4.  Potential procedures: bilateral occipital nerve blocks ordered today. Hopefully this will be greatly beneficial. If not, would recommend cervical medial branch block to radiofrequency ablation. Call if not having benefit about 2 weeks after injections.     5.  Continue to work with Dr. Encinas as planned.    .  Follow up with NANCY Henderson CNP in 6 weeks.       ----------------------------------------------------------------  Clinic Number:  [phone]     Call with any questions about your care and for scheduling assistance.     Calls are returned Monday through Friday between 8 AM and 4:30 PM. We usually get back to you within 2 business days depending on the issue/request.    If we are prescribing your medications:    For opioid medication refills, call the clinic or send a Mobile [employer] message 7 days in advance.  Please include:    Name of requested medication    Name of the pharmacy.    For non-opioid medications, call your pharmacy directly to request a refill. Please allow 3-4 days to be processed.     Per [place] State Law:    All controlled substance prescriptions must be filled within 30 days of being written.      For those controlled substances allowing refills, pickup must occur within 30 days of last fill.      We believe regular attendance is key to your success in our program!      Any time you are unable to keep your appointment we ask that you call us at least 24 hours in advance to cancel.This will allow us to offer the appointment time to another  patient.     Multiple missed appointments may lead to dismissal from the clinic.

## 2019-10-31 NOTE — PROGRESS NOTES
Pre procedure Diagnosis: Occipital neuralgia   Post procedure Diagnosis: Same  Procedure performed: Bilateral occipital nerve blocks  Anesthesia: none  Complications: none  Operators: Domi Gross MD    Indications:   Becky Lay is a 59 year old female with a history of neck pain and headaches and they have tried conservative treatment including medications and trigger point injections.     Options/alternatives, benefits and risks were discussed with the patient including bleeding, infection, hematoma, nerve damage, and stroke, Questions were answered to her satisfaction and she agrees to proceed. Voluntary informed consent was obtained and signed.     Vitals were reviewed: Yes  Allergies were reviewed:  Yes   Medications were reviewed:  Yes   Pre-procedure pain score: 4/10    Procedure:  After getting informed consent, a Pause for the Cause was performed.    The occipital ridge, occipital protuberance, and mastoid process were palpated on the right and left sides.  The location of maximal tenderness which was consistent with the location of the greater occipital nerve was palpated.  The area was cleaned.    Palpation for a pulse was completed, with no pulse noted at the site of the injection.  A 25G, 1.5 inch needle was introduced, aimed cephalad, in the superficial tissues at this area of tenderness.  The injection was completed at this location, fanning in 3 different directions.  Aspiration for heme was negative before all injections.    In total, 3 ml of 0.5% bupivacaine, 3ml of 1% lidocaine was injected equally between both sides.     The patient tolerated the procedure well, hemostasis was achieved.      Post-procedure pain score: 2/10  Follow-up includes:   -f/u with NANCY Mora CNP, MD  [facility]

## 2019-11-01 ENCOUNTER — OFFICE VISIT (OUTPATIENT)
Dept: PALLIATIVE MEDICINE | Facility: CLINIC | Age: 59
End: 2019-11-01
Payer: COMMERCIAL

## 2019-11-01 VITALS — SYSTOLIC BLOOD PRESSURE: 116 MMHG | OXYGEN SATURATION: 99 % | HEART RATE: 69 BPM | DIASTOLIC BLOOD PRESSURE: 83 MMHG

## 2019-11-01 DIAGNOSIS — M54.81 BILATERAL OCCIPITAL NEURALGIA: Primary | ICD-10-CM

## 2019-11-01 PROCEDURE — 64405 NJX AA&/STRD GR OCPL NRV: CPT | Mod: 50 | Performed by: PHYSICAL MEDICINE & REHABILITATION

## 2019-11-01 RX ORDER — BUPIVACAINE HYDROCHLORIDE 5 MG/ML
3 INJECTION, SOLUTION EPIDURAL; INTRACAUDAL ONCE
Status: COMPLETED | OUTPATIENT
Start: 2019-11-01 | End: 2019-11-01

## 2019-11-01 RX ADMIN — BUPIVACAINE HYDROCHLORIDE 15 MG: 5 INJECTION, SOLUTION EPIDURAL; INTRACAUDAL at 13:45

## 2019-11-01 ASSESSMENT — PAIN SCALES - GENERAL: PAINLEVEL: MODERATE PAIN (4)

## 2019-11-01 NOTE — PATIENT INSTRUCTIONS
[facility] [place] [facility]   Post Procedure Instructions    Today you had:   occipital nerve block     Medications used:  lidocaine   bupivicaine        Monitor the injection sites for signs and symptoms of infection-fever, chills, redness, swelling, warmth, or drainage to areas.    You may have soreness at injection sites for up to 24 hours.    You may apply ice to the painful areas to help minimize the discomfort of the needle pokes.    Do not apply heat to sites for at least 12 hours.    You may use anti-inflammatory medications or Tylenol for pain control if necessary    Pain Clinic phone number during work hours Monday-Friday:  [phone]    After hours provider line: [phone]

## 2019-11-08 ENCOUNTER — OFFICE VISIT (OUTPATIENT)
Dept: PALLIATIVE MEDICINE | Facility: CLINIC | Age: 59
End: 2019-11-08
Payer: COMMERCIAL

## 2019-11-08 DIAGNOSIS — M54.81 BILATERAL OCCIPITAL NEURALGIA: Primary | ICD-10-CM

## 2019-11-08 DIAGNOSIS — M79.18 MYOFASCIAL PAIN: ICD-10-CM

## 2019-11-08 DIAGNOSIS — M47.812 CERVICAL SPONDYLOSIS WITHOUT MYELOPATHY: ICD-10-CM

## 2019-11-08 DIAGNOSIS — M62.838 MUSCLE SPASM: ICD-10-CM

## 2019-11-08 DIAGNOSIS — G89.4 CHRONIC PAIN SYNDROME: ICD-10-CM

## 2019-11-08 PROCEDURE — 97112 NEUROMUSCULAR REEDUCATION: CPT | Mod: GP | Performed by: PHYSICAL THERAPIST

## 2019-11-08 PROCEDURE — 96150 HC HEALTH & BEHAVIOR ASSESS INITIAL, EA 15MIN: CPT | Performed by: PSYCHOLOGIST

## 2019-11-08 NOTE — PROGRESS NOTES
[facility]     BEHAVIORAL DIAGNOSTIC ASSESSMENT    IDENTIFYING INFORMATION:IDENTIFYING INFORMATION: The patient is a 59 year old, , ,  Individual, who was seen today for a behavioral assessment as part of the evaluation process at the [facility].         This patient is referred for consultation by NANCY Henderson CNP; please see their notes for more details of their pain symptoms. This patient is referred to pain services by NANCY Stover CNP. Patient's primary complaint today is chronic pain.     Patient reports difficulty with function in relationship to their pain. Patient reports onset of their pain symptoms longer standing, first addressed in 2003, with worsening of symptoms in July 2019. Describes flares of pain with periods of little to no pain. Patient  believes the following factors contribute to their pain: arthritis and tension. Their pain is generally located in her neck.  Factors that increase their pain include movement of her neck, lifting her grandchildren. Patient utilizes the following strategies to find relief from their pain: ice, muscle relaxants, baths, exercising, socializing. Patient reports their pain ranges in intensity from 1 to 9. They describe their pain as knot, tension, starts in occipital area and radiates up her head, photophobia . 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 Patient spends time thinking about their pain only when it is preventing her from engaging in activity. Patient is able to pace their activity or obey limitations their chronic pain places on them. Patient s pain negatively impacts their ability to relax. Patient has not worked with a pain clinic in the past. As a result of their pain, they rate their stress level as low to severe depending on pain flare. Patient reports current stressors include pain, mother's health issues.    Patient reports the following as it relates to how their pain impacts their sleep hygiene (endorsed in BOLD):  Difficulty falling asleep / problems mid-awakenings / poor quality of sleep / daytime sleepiness or fatigue / napping    Patient reports obtaining approximately 7-9 hours of sleep per night. This sleep is occasionally by insomnia, however not from her pain.   Patient reports their exercise regimen currently includes walking with her  3-4 times per week, or walks on her own, used to dance with her  however has taken a break recently.    PSYCHOLOGY SYMPTOMS:     DEPRESSION: Denied         Symptom List: ENDORSED in bold: Anhedonia / depressed or sad / hopelessness / sleep impairment / low energy / appetite changes / low self-esteem / concentration issues / restlessness or slowed down / suicidal ideation        Total PHQ-9 = 1 (5-9 mild, 10-14 mod, 15-19 mod sev,          >20 Sev). Note: The full PHQ-9 has been scanned - see media tab.      ANXIETY: Denied            Symptom List :  ENDORSED in bold:  Nervous, anxious or on edge / can t stop worry / too much worry about different things / trouble relaxing / restlessness or hard to sit still / easily annoyed or irritable / fearful of awful thing happening       Total FRANCISCO-7= 0{ (5-9 mild, 10-14 mod, 15-21 severe)          Note the full FRANCISCO-7 has been scanned-see media tab     MENTAL  HEALTH HISTORY:        Patient reports being diagnosed with nothing in the past. Patient reports no history of hospitalization for mental health reasons.     Patient reports the following psychotropic medications are currently prescribed: no  and the following have been prescribed in the past: n/a. Patient reports n/a side effects from their medications. Patient s mental health history and support includes nothing. Patient reports no history of suicidal ideation. Patient reports no history of homicidal ideation. Patient reports no history of  abuse. Patient denies symptoms of eren, psychosis, disordered eating, PTSD. Other symptoms patient reports include past anxiety related to public speaking.               STRENGTHS INCLUDE:    Caring person, simple pleasures make her happy, overall happy, easy going, good wife and mother and friend    SOCIAL HISTORY:  [place]  [facility] Patient has 4 children. Patient's social support network includes , daughters, friends, Faith friends. Patient was raised in [place] and has 1 brother (-4). Patient states her parents relationship with each other was wonderful. Describes her family as very loving, mother was stay at home mom until father's heart attack at age 14. Reports her parents immigrated to [place] after WWII from [place]. Parents were both used for labor by the Nazis. Father was a , mother worked for [employer] after father's education. Father had 2nd grade education and mother had a 5th grade education. Patient reports positive family history of mental health issues - states her mother was quite anxious and her mother has Lewy-Body dementia. Patient reports no family history of substance abuse issues.                CHEMICAL HEALTH BEHAVIORS:        Patient reports no current alcohol use - doesn't mix well with headaches, no problem drinking.  Patient reports no recreational drug use. Patient  reports no tobacco use. Patient reports no caffeine use - drinks decaf occasionally and herbal tea. Patient reports no previous chemical dependency or other addictive behavior treatment.            CAGE/ AID QUESTIONNAIRE:             1. Have you ever felt you should cut down on your drinking of drug use?   no            2. Have people annoyed you by criticizing your drinking or drug use?  no            3. Have you ever felt bad or guilty about your drinking of drug use?  no            4. Have you ever had a drink of used drugs the first thing in the morning to steady      your nerves or get rid of a hangover?  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 Patient's highest level of education completed is BA in nursing. Patient has no history in the . Patient is not pursuing disability status.    PATIENT'S GOALS:   'I'd like to learn more coping skills to reduce headaches.'    MINI MENTAL STATUS EXAM  Assessment: Current Emotional / Mental Status    Appearance:   Appropriate   Eye Contact:   Fair   Psychomotor Behavior: Normal   Attitude:   Cooperative  Guarded   Orientation:   All  Speech Rate              Normal   Speech Volume:                     Normal   Mood:    Normal  Affect:    Appropriate    Thought Content:  Clear   Thought Form:  Coherent  Logical   Insight:               Fair         Pain Diagnoses:  Per pain provider:   Cervical spondylosis without myelopathy, Bilateral occipital neuralgia,    Myofascial pain, Muscle spasm, chronic pain syndrome      PLAN:  The importance of pain treatment planning was briefly discussed with the patient.            ASSESSMENT:   Patient is here today to determine whether pain psychology could be of benefit to their pain management services.  Patient reports longer-standing pain in her neck that radiates up and over her cranium, leading to headaches with photophobia. Her pain has impacted her in several life areas, including socially, occupationally, more emotional than typical for her, however, she denies any diagnosis of or symptoms of mental health disorder. She reports good relief from her pain by using ice, muscle relaxants, baths, exercising, socializing. Discussed that she may benefit from shorter-term pain psychology to address development of a pain self-care plan and pain flare plan, exploration of trigger points that she can use during pain flares, relaxation techniques to include hypnosis and glove anaesthesia, and basic psychoeducation on how pain and mood interplay.      The patient participated in a health and behavioral evaluation (billed 27874).  The limits of confidentiality and mandated reporting requirements were discussed.   Time spent with patient: 1 hour in direct patient contact for a psychological diagnostic assessment and pain evaluation.               Meg Hammer PsyD, LP ...............  November 8, 2019  Outpatient Clinic Therapist  [facility]    Disclaimer: This note consists of symbols derived from keyboarding, dictation and/or voice recognition software. As a result, there may be errors in the script that have gone undetected. Please consider this when interpreting information found in this chart.

## 2019-11-08 NOTE — PROGRESS NOTES
"PAIN PHYSICAL THERAPY PROGRESS NOTE  Patient Name: Becky Lay      YOB: 1960     Medical Record Number: [id number]  Diagnosis:    Bilateral occipital neuralgia  Cervical spondylosis without myelopathy  Myofascial pain    Visit: 2/4-6  Chief Complaint: headache pain that starts in occipital area; started having problems with HA pain in July 2019; diagnosed with cervicogenic HA; HA's occur daily; pain and muscle tension vary in intensity  TPI in August helped to relax neck muscles and lasted a few weeks    She first started having problems with neck pain and headaches in her 20s. States she, \"tweaked her neck easily.\" Insidious onset, without acute precipitating event. Her pain gradually worsened over the years, significantly in 2002. She completed physical therapy without benefit. She was referred to neurosurgery in 2003. She had a C5-6 discectomy and fusion with Dr. Jose at [facility] in March of 2003. She notes her neck pain improved greatly post operatively, numbness/ache in left arm subsided. Her neck pain has gradually worsened since 2013, states neck pain usually comes in bouts. She was referred to [place] [facility] around 2013, had x1 visit (?) and a cervical MRI completed. She usually manages her flares with ibuprofen, Flexeril, and occasionally Prednisone with good benefit. This most recent flare started in mid July while on vacation, reports the most significant flare in last six years. Her pain initially subsided with Aleve but then returned. She was referred to me for trigger point injections in August, had on 8/23/19. She notes 2 weeks of significant benefit, possibly 2.5 weeks. Pain returned and has persisted. The pain is located at the right base of her neck, often in left side as well. She has a hx of migraines, the pain starts in the back of her head, can be right sided or bilateral and often radiates over head. Describes the pain as an ache, has photophobia and " "phonophobia with headaches, also has nausea with headaches. Her headaches are equally as painful as her neck pain. Headaches have been nonstop over the last week, usually 15+ a month, lasting for 24 hours or longer. Very rare numbness and tingling in left forearm only. Subjective weakness in left arm since surgery.     Patient's goals for physical therapy: to be headache free; be able to read/go dancing; be able to enjoy spending time (10 hours once per week childcare) with grandchildren ages 4 and 2 yrs old    Subjective: Patient reports very stressful week with family medical emergencies.  Feeling better since occipital nerve blocks.  Today reports feeling suboccipital muscle tightness on the drive here today.    Self Care  HEP: next  Walking/Aerobic Activity: next  Posture: next  Breathing/Relaxation: next  Heat/Ice: next  Mini Breaks: next  Pacing: next      Objective Findings:   POSTURE:  Observation: Patient demonstrates forward head, protracted shoulders and thoracic kyphosis in standing and sitting posture.   Noted reduced lumbar lordosis and posterior weight shift in standing.  Poor kinesthetic/body awareness  GAIT, LOCOMOTION, and BALANCE:  Gait and Locomotion: normal velocity; impaired arm swing R>L  RANGE OF MOTION:   Cervical: limited to 75% WFL all directions  Lumbar: limited to 75% WFL right SB, limited 50% left SB mostly upper ribcage/thoracic spine  Shoulders: WFL  Hips: next  MUSCLE PERFORMANCE:   Strength: demonstrates core and scapular weakness  Flexibility: tightness noted in upper traps, cervical paraspinals, suboccipitals L > R  FUNCTIONAL TESTING/OBSERVATION: independent chair mobility; not able to turn head easily so repositions with chair  Pain behaviors: None    Instructed in supine body scan.  Instructed in side lying right shoulder glides; progressing to right shoulder circles.  Feels more relaxed and \"centered\" at end of session.     Treatment Interventions:  Neuromuscular Reeducation:  "  For 45 minutes as above.  __________________________________________________________________    Assessment:  Ongoing Functional Limitations Include:  Patient tolerated/responded well to treatment    Intensity Level: 3 (1=low intensity; 5=high intensity)  Demonstrates/Verbalizes Technique: 4 (1= poor technique-difficulty performing exercises,significant cues required; 5= good technique-performs exercises without cues)  Body Awareness: 4 (1=low awareness; 5=high awareness)  Posture/Stability: 3 (1= poor posture, stability; 5= good posture, stability)  Motivational Level: Ask questions, Eager to learn and Cooperative  Response to Teaching: cooperative  Factors that affect learning: None    _______________________________________________________________________  Plan of Care  Continue PT to support reactivation and integration of self regulation pain management skills;  Continue with prescribed plan of care - progress as tolerated.  Focus next session will be on: self cares, supine thoracic extension/differentiation of scapula/ribs, SOR     Present:  PRISCILA     Total Visit Time:  45 minutes    Therapist: Yoselin Duarte, PT          Date: 11/8/2019

## 2019-11-15 ENCOUNTER — OFFICE VISIT (OUTPATIENT)
Dept: PALLIATIVE MEDICINE | Facility: CLINIC | Age: 59
End: 2019-11-15
Payer: COMMERCIAL

## 2019-11-15 DIAGNOSIS — G89.4 CHRONIC PAIN SYNDROME: ICD-10-CM

## 2019-11-15 DIAGNOSIS — M47.812 CERVICAL SPONDYLOSIS WITHOUT MYELOPATHY: ICD-10-CM

## 2019-11-15 DIAGNOSIS — M54.81 BILATERAL OCCIPITAL NEURALGIA: Primary | ICD-10-CM

## 2019-11-15 DIAGNOSIS — M79.18 MYOFASCIAL PAIN: ICD-10-CM

## 2019-11-15 PROCEDURE — 97530 THERAPEUTIC ACTIVITIES: CPT | Mod: GP | Performed by: PHYSICAL THERAPIST

## 2019-11-15 NOTE — PROGRESS NOTES
"PAIN PHYSICAL THERAPY PROGRESS NOTE  Patient Name: Becky Lay      YOB: 1960     Medical Record Number: [id number]  Diagnosis:    Bilateral occipital neuralgia  Cervical spondylosis without myelopathy  Myofascial pain  Chronic pain syndrome    Visit: 3/4-6  Chief Complaint: headache pain that starts in occipital area; started having problems with HA pain in July 2019; diagnosed with cervicogenic HA; HA's occur daily; pain and muscle tension vary in intensity  TPI in August helped to relax neck muscles and lasted a few weeks    She first started having problems with neck pain and headaches in her 20s. States she, \"tweaked her neck easily.\" Insidious onset, without acute precipitating event. Her pain gradually worsened over the years, significantly in 2002. She completed physical therapy without benefit. She was referred to neurosurgery in 2003. She had a C5-6 discectomy and fusion with Dr. Jose at [facility] in March of 2003. She notes her neck pain improved greatly post operatively, numbness/ache in left arm subsided. Her neck pain has gradually worsened since 2013, states neck pain usually comes in bouts. She was referred to [facility] around 2013, had x1 visit (?) and a cervical MRI completed. She usually manages her flares with ibuprofen, Flexeril, and occasionally Prednisone with good benefit. This most recent flare started in mid July while on vacation, reports the most significant flare in last six years. Her pain initially subsided with Aleve but then returned. She was referred to me for trigger point injections in August, had on 8/23/19. She notes 2 weeks of significant benefit, possibly 2.5 weeks. Pain returned and has persisted. The pain is located at the right base of her neck, often in left side as well. She has a hx of migraines, the pain starts in the back of her head, can be right sided or bilateral and often radiates over head. Describes the pain as an " "ache, has photophobia and phonophobia with headaches, also has nausea with headaches. Her headaches are equally as painful as her neck pain. Headaches have been nonstop over the last week, usually 15+ a month, lasting for 24 hours or longer. Very rare numbness and tingling in left forearm only. Subjective weakness in left arm since surgery.     Patient's goals for physical therapy: to be headache free; be able to read/go dancing; be able to enjoy spending time (10 hours once per week childcare) with grandchildren ages 4 and 2 yrs old    Subjective: Patient reports another very stressful week with family medical issues.  Feeling better since occipital nerve blocks.  Today reports feeling suboccipital and left side neck muscle tightness   Pt admits she mostly operates in \"stress mode\" during the day.  Self Care  HEP: upper trap, scalene, levator, shoulder circles  Walking/Aerobic Activity: next  Posture: neutral pelvis/spine in sitting  Breathing/Relaxation: issued handout  Heat/Ice: next  Mini Breaks: indep  Pacing: indep      Objective Findings:   POSTURE:  Observation: Patient demonstrates forward head, protracted shoulders and thoracic kyphosis in standing and sitting posture.   Noted reduced lumbar lordosis and posterior weight shift in standing.  Poor kinesthetic/body awareness  GAIT, LOCOMOTION, and BALANCE:  Gait and Locomotion: normal velocity; impaired arm swing R>L  RANGE OF MOTION:   Cervical: limited to 75% WFL all directions  Lumbar: limited to 75% WFL right SB, limited 50% left SB mostly upper ribcage/thoracic spine  Shoulders: WFL  Hips: next  MUSCLE PERFORMANCE:   Strength: demonstrates core and scapular weakness  Flexibility: tightness noted in upper traps, cervical paraspinals, suboccipitals L > R  FUNCTIONAL TESTING/OBSERVATION: independent chair mobility; not able to turn head easily so repositions with chair  Pain behaviors: None       Treatment Interventions:  Therapeutic activities:   For 45 " minutes as above including instruction in HEP, breathing and posture  __________________________________________________________________    Assessment:  Ongoing Functional Limitations Include:  Patient tolerated/responded well to treatment    Intensity Level: 3 (1=low intensity; 5=high intensity)  Demonstrates/Verbalizes Technique: 4 (1= poor technique-difficulty performing exercises,significant cues required; 5= good technique-performs exercises without cues)  Body Awareness: 4 (1=low awareness; 5=high awareness)  Posture/Stability: 3 (1= poor posture, stability; 5= good posture, stability)  Motivational Level: Ask questions, Eager to learn and Cooperative  Response to Teaching: cooperative  Factors that affect learning: None    _______________________________________________________________________  Plan of Care  Continue PT to support reactivation and integration of self regulation pain management skills;  Continue with prescribed plan of care - progress as tolerated.  Focus next session will be on: monitor self cares, supine thoracic extension/differentiation of scapula/ribs, SOR     Present:  PRISCILA     Total Visit Time:  45 minutes    Therapist: Yoselin Duarte, PT          Date: 11/15/2019

## 2019-11-22 ENCOUNTER — OFFICE VISIT (OUTPATIENT)
Dept: PALLIATIVE MEDICINE | Facility: CLINIC | Age: 59
End: 2019-11-22
Payer: COMMERCIAL

## 2019-11-22 DIAGNOSIS — G89.4 CHRONIC PAIN SYNDROME: ICD-10-CM

## 2019-11-22 DIAGNOSIS — M54.81 BILATERAL OCCIPITAL NEURALGIA: Primary | ICD-10-CM

## 2019-11-22 PROCEDURE — 96152 HC HEALTH AND BEHAVIOR INTERVENTION, INDIVIDUAL, EACH 15 MINUTES: CPT | Performed by: PSYCHOLOGIST

## 2019-11-22 NOTE — PROGRESS NOTES
[facility]   [facility]  Behavioral Medicine Visit    Patient Name: Becky Lay     YOB: 1960   Medical Record Number: [id number]  Date: 11/22/2019                SUBJECTIVE: Patient reports her pain and mood have both greatly improved. She feels the injections were very successful. Activity level is greatly increased. Stress the same. Sleep the same. Engages in self-care for her pain 2-3 times per day. Explored imagery to induce relaxation, which seemed to work quite well. Discussed other strategies to manage distress, including using imagery as practice today, using acupressure points for headaches, continuing to engage in body scans throughout the day, using prayer and stretching.    OBJECTIVE: Patient presents with a fairly robust set of coping skills, and may only require another couple sessions to fine tune skills.    Length of Visit: 60 minutes     Pain Diagnoses per pain provider:   Bilateral occipital neuralgia, chronic pain syndrome     Assessment: Current Emotional / Mental Status    Appearance:   Appropriate   Eye Contact:   Good   Psychomotor Behavior: Normal   Attitude:   Cooperative   Orientation:   All  Speech  Rate / Production:             Normal   Volume:              Normal   Mood:    Normal  Affect:    Appropriate  Bright   Thought Content:  Clear   Thought Form:  Coherent  Logical   Insight:    Good     ASSESSMENT:   Progress toward goals: good.    Pain Status: improved    Emotional Status: improved              Medication / chemical use concerns:  None    PLAN:   Next Appointment: Becky Lay will schedule a follow-up appointment in 2-3 week(s).  Assignment: Practice imagery daily.  Objectives / interventions for next session: Explore any barriers to effective use of imagery or other coping skills, fine tune as needed.    Meg Hammer PsyD LP  Outpatient Clinic Therapist  [facility]  [facility]    Disclaimer: This note consists of symbols derived from keyboarding, dictation and/or voice recognition software. As a result, there may be errors in the script that have gone undetected. Please consider this when interpreting information found in this chart.

## 2019-12-26 NOTE — PROGRESS NOTES
[facility]     Date of visit: 12/27/2019    Chief complaint:   Chief Complaint   Patient presents with     Pain       Interval history:  Becky Lay is a 59 year old female last seen by me on 10/29/19.      Recommendations/plan at the last visit included:   1.  Pain Physical Therapy:     YES  Has had x1 visit with pain physical therapy so far, unsure of if benefit. Recommend she continue visits with Yoselin Duarte PT as able.     2.  Pain Psychologist to address relaxation, behavioral change, coping style, and other factors important to improvement.     YES  Go to  First visit with Meg Hammer PsyD, LP as planned.    3.  Medication Management:     1. Continue ibuprofen and Robaxin as needed.    2. Becky Montenegro is not interested in starting a daily preventative medication at this time. She will let me know if interested.   4.  Potential procedures: it sounds at though her neck pain and headaches may be due to a combination of occipital neuralgia and neck pathology. We will start with bilateral occipital nerve blocks, ordered today. Hopefully this will be of great benefit. If not, would recommend cervical medial branch block to radiofrequency ablation (likely TON, C3,4 but will review results of cervical MRI first). She will call if not having benefit about 2 weeks after occipital nerve block.     5.  Continue to work with Dr. Encinas with [facility]. She will request cervical MRI be sent to our clinic.    6.  Follow up with NANCY Henderson CNP in 6 weeks.       Since her last visit, Becky Lay reports:  -Her pain is MUCH better than it was at last visit.   -She returns today to discuss long term plan moving forward.   -She attributes improvement to be due to injections, pain physical therapy, and pain psychology.   -She had bilateral occipital nerve blocks with Dr. Gross on 11/1/19. She noticed substantial improvement immediately afterwards but it took  "about a week for full benefit. She notes continued improvement slowly over that month that has continued. She now occasionally has a \"twinge of a headache\" but feels substantially improved.    -She continues to have benefit from trigger point injections for neck pain, states it helped her \"loosen up.\" States, \"I don't remember the last time I felt this good in the neck.\"   -She has had two visits with Yoselin Duarte PT and Meg Hammer PsyD, LP, both of these resources have been helpful but has completed.   -She has a cruise coming up in January, will be going to the [place].       Pain Information:   Pain quality: aching   Pain timing: constant     Pain rating: intensity ranges from 0/10 to 1-2/10, and averages 0/10 on a 0-10 scale.   Aggravating factors include: turning head   Relieving factors include: medicine, ice, injetions    Current pain medications:               Flexeril 10mg prn- [facility] now takes prn at bedtime as somewhat more effective                Ibuprofen 400mg BID or TID- [facility], not taking regularly              Tylenol 1000mg prn - ?, not taking currently     Current MME: 0    Review of [place] Prescription Monitoring Program (): No concern for abuse or misuse of controlled medications based on this report.     Annual Controlled Substance Agreement/UDS due date: NA    Past pain treatments:  1. Previous Pain Relevant Medications:  NOTE: This medication information taken from patient's intake form, not medical records.               Opiates:  Vicodin- SE, nausea/vomiting, \"no I don't want it\"               NSAIDS: ibuprofen- [facility], naproxen- H              Muscle Relaxants: Flexeril- [facility] usually              Anti-migraine mediations: no              Anti-depressants: no              Sleep aids: no              Anxiolytics: no              Neuropathics: no                       Topicals: no              Other medications not covered above: Tylenol- ?, Prednisone- H     2. Physical Therapy: yes " prior to surgery and after- NH, practices occasionally, pain physical therapy- [facility]  3. Pain Psychology: yes Meg Hammer [facility]- [facility]  4. Surgery:  C5-6 discectomy and fusion with Dr. Jose at [facility] in March of 2003  5. Injections: bilateral occipital nerve block with Dr. Gross on 11/1/19- H!!!!   trigger point injections with me on 9/23/19- H for neck pain, continue to have some benefit  trigger point injections with me 8/23/19 - H for 2 weeks neck pain and headaches, 2.5 weeks tops  6. Chiropractic: no  7. Acupuncture: no  8. TENS Unit: no    Medications:  Current Outpatient Medications   Medication Sig Dispense Refill     acetaminophen (TYLENOL) 500 MG tablet Take 500-1,000 mg by mouth every 6 hours as needed for mild pain       CITRACAL/VITAMIN D PO 2 a day       ibuprofen (ADVIL/MOTRIN) 200 MG tablet Take 200 mg by mouth every 4 hours as needed for mild pain         Medical History: any changes in medical history since they were last seen? No    Review of Systems:  The 14 system ROS was reviewed from the intake questionnaire, and is positive for: neck pain, joint pain, stiffness  Any bowel or bladder problems: denies  Mood: denies depression or anxiety    Physical Exam:  Blood pressure 111/76, pulse 73, last menstrual period 06/20/2008, SpO2 100 %, not currently breastfeeding.  General: A&O x4, no signs of distress. Well groomed.   Gait: Normal  Neuro exam: 5/5 upper and lower extremity strength.    Imaging:  BIBIANA for cervical MRI completed today.     Assessment:   Becky Lay is a 59 year old female with a past medical history significant for hyperhidrosis who presents with complaints of neck pain.      1. Neck pain- etiology likely combination of facet arthropathy and occipital neuralgia, s/p C5-6 discectomy and fusion with Dr. Jose at [facility] in March of 2003  2. Mental Health - the patient's mental health concerns, specifically situational depression, affect her experience  of pain and contribute to her clinically significant distress.     1. Bilateral occipital neuralgia    2. Myofascial pain        Plan:     1.  Pain Physical Therapy:  Completed with benefit.    2.  Pain Psychologist to address relaxation, behavioral change, coping style, and other factors important to improvement.  Completed with benefit.    3.  Medication Management:     1. Okay to take ibuprofen, Tylenol, and Flexeril as needed for headaches.     4.  Potential procedures:  occipital nerve blocks (on 11/1) has essentially eliminated daily headaches, continues to have great benefit! I recommend we monitor benefit and repeat as needed. Could add in steroid if needed. Trigger point injections (on 9/23) have greatly reduced muscle tension and neck pain, continues to have excellent benefit. Okay to repeat trigger point injections as needed. Moving forward, she can call or MyChart when feel need to repeat.    5.  Follow up with NANCY Henderson CNP in 8 weeks, okay to push out another 8 weeks if doing well.     I spent 30 minutes of time face to face with the patient.  Greater than 50% of this time was spent in patient counseling and/or coordination of care.    Jacquelyn CRUZ CNP  [facility]

## 2019-12-27 ENCOUNTER — OFFICE VISIT (OUTPATIENT)
Dept: PALLIATIVE MEDICINE | Facility: CLINIC | Age: 59
End: 2019-12-27
Payer: COMMERCIAL

## 2019-12-27 VITALS — SYSTOLIC BLOOD PRESSURE: 111 MMHG | DIASTOLIC BLOOD PRESSURE: 76 MMHG | HEART RATE: 73 BPM | OXYGEN SATURATION: 100 %

## 2019-12-27 DIAGNOSIS — M54.81 BILATERAL OCCIPITAL NEURALGIA: Primary | ICD-10-CM

## 2019-12-27 DIAGNOSIS — M79.18 MYOFASCIAL PAIN: ICD-10-CM

## 2019-12-27 PROCEDURE — 99214 OFFICE O/P EST MOD 30 MIN: CPT | Performed by: NURSE PRACTITIONER

## 2019-12-27 ASSESSMENT — PAIN SCALES - GENERAL: PAINLEVEL: NO PAIN (0)

## 2019-12-27 NOTE — PATIENT INSTRUCTIONS
1.  Pain Physical Therapy:  Completed.    2.  Pain Psychologist to address relaxation, behavioral change, coping style, and other factors important to improvement.  Completed.    3.  Medication Management:     1. Okay to take ibuprofen, Tylenol, and Flexeril as needed for headaches.     4.  Potential procedures: okay to repeat occipital nerve blocks (11/1) and trigger point injections (9/23) as needed. You can call or MyChart when feel need to repeat. Monitor how long benefit.     5.  Follow up with NANCY Henderson CNP in 8 weeks, okay to push out another 8 weeks if doing well.       ----------------------------------------------------------------  Clinic Number:  [phone]     Call with any questions about your care and for scheduling assistance.     Calls are returned Monday through Friday between 8 AM and 4:30 PM. We usually get back to you within 2 business days depending on the issue/request.    If we are prescribing your medications:    For opioid medication refills, call the clinic or send a [employer] message 7 days in advance.  Please include:    Name of requested medication    Name of the pharmacy.    For non-opioid medications, call your pharmacy directly to request a refill. Please allow 3-4 days to be processed.     Per [place] State Law:    All controlled substance prescriptions must be filled within 30 days of being written.      For those controlled substances allowing refills, pickup must occur within 30 days of last fill.      We believe regular attendance is key to your success in our program!      Any time you are unable to keep your appointment we ask that you call us at least 24 hours in advance to cancel.This will allow us to offer the appointment time to another patient.     Multiple missed appointments may lead to dismissal from the clinic.

## 2020-01-03 NOTE — PROGRESS NOTES
Cervical MRI records received from [facility]. Unable to locate cervical MRI records from 2019.     Cervical MRI was completed on 8/22/13 and shows:        NANCY Henderson CNP  [facility]

## 2020-01-09 ENCOUNTER — OFFICE VISIT (OUTPATIENT)
Dept: PALLIATIVE MEDICINE | Facility: CLINIC | Age: 60
End: 2020-01-09
Payer: COMMERCIAL

## 2020-01-09 ENCOUNTER — OFFICE VISIT (OUTPATIENT)
Dept: URGENT CARE | Facility: URGENT CARE | Age: 60
End: 2020-01-09
Payer: COMMERCIAL

## 2020-01-09 VITALS
WEIGHT: 130 LBS | TEMPERATURE: 98.6 F | DIASTOLIC BLOOD PRESSURE: 81 MMHG | HEART RATE: 81 BPM | SYSTOLIC BLOOD PRESSURE: 125 MMHG | BODY MASS INDEX: 22.31 KG/M2 | OXYGEN SATURATION: 98 %

## 2020-01-09 VITALS — HEART RATE: 77 BPM | OXYGEN SATURATION: 98 % | SYSTOLIC BLOOD PRESSURE: 125 MMHG | DIASTOLIC BLOOD PRESSURE: 86 MMHG

## 2020-01-09 DIAGNOSIS — M54.81 BILATERAL OCCIPITAL NEURALGIA: Primary | ICD-10-CM

## 2020-01-09 DIAGNOSIS — B02.9 HERPES ZOSTER WITHOUT COMPLICATION: Primary | ICD-10-CM

## 2020-01-09 DIAGNOSIS — M79.18 MYOFASCIAL PAIN: ICD-10-CM

## 2020-01-09 PROCEDURE — 99214 OFFICE O/P EST MOD 30 MIN: CPT | Performed by: FAMILY MEDICINE

## 2020-01-09 PROCEDURE — 64405 NJX AA&/STRD GR OCPL NRV: CPT | Mod: 50 | Performed by: NURSE PRACTITIONER

## 2020-01-09 PROCEDURE — 99214 OFFICE O/P EST MOD 30 MIN: CPT | Mod: 25 | Performed by: NURSE PRACTITIONER

## 2020-01-09 RX ORDER — BUPIVACAINE HYDROCHLORIDE 5 MG/ML
10 INJECTION, SOLUTION EPIDURAL; INTRACAUDAL ONCE
Status: COMPLETED | OUTPATIENT
Start: 2020-01-09 | End: 2020-01-09

## 2020-01-09 RX ORDER — BUPIVACAINE HYDROCHLORIDE 5 MG/ML
2.5 INJECTION, SOLUTION EPIDURAL; INTRACAUDAL ONCE
Status: COMPLETED | OUTPATIENT
Start: 2020-01-09 | End: 2020-01-09

## 2020-01-09 RX ORDER — CYCLOBENZAPRINE HCL 10 MG
TABLET ORAL
COMMUNITY
End: 2020-05-28

## 2020-01-09 RX ORDER — TRIAMCINOLONE ACETONIDE 40 MG/ML
40 INJECTION, SUSPENSION INTRA-ARTICULAR; INTRAMUSCULAR ONCE
Status: DISCONTINUED | OUTPATIENT
Start: 2020-01-09 | End: 2020-01-09 | Stop reason: CLARIF

## 2020-01-09 RX ORDER — VALACYCLOVIR HYDROCHLORIDE 1 G/1
1000 TABLET, FILM COATED ORAL 3 TIMES DAILY
Qty: 21 TABLET | Refills: 0 | Status: SHIPPED | OUTPATIENT
Start: 2020-01-09 | End: 2020-08-25

## 2020-01-09 RX ADMIN — BUPIVACAINE HYDROCHLORIDE 50 MG: 5 INJECTION, SOLUTION EPIDURAL; INTRACAUDAL at 09:45

## 2020-01-09 RX ADMIN — BUPIVACAINE HYDROCHLORIDE 12.5 MG: 5 INJECTION, SOLUTION EPIDURAL; INTRACAUDAL at 09:30

## 2020-01-09 ASSESSMENT — PAIN SCALES - GENERAL: PAINLEVEL: MILD PAIN (3)

## 2020-01-09 NOTE — PATIENT INSTRUCTIONS
1.  Pain Physical Therapy:     Completed. Continue to practice gentle stretches and exercises.    2.  Pain Psychologist to address relaxation, behavioral change, coping style, and other factors important to improvement.   Completed.   3.  Medication Management:     1. Continue ibuprofen, Tylenol, and Flexeril as needed.    4.  Potential procedures: trigger point injections and occipital nerve blocks completed today. Monitor benefit.     5.  Follow up with NANCY Henderson CNP in 8 weeks, okay to push out further if doing well.     [facility]   Post Procedure Instructions    Today you had:  trigger point injections  and occipital nerve block       Medications used:  lidocaine   bupivicaine   kenolog           Go to the emergency room if you develop any shortness of breath    Monitor the injection sites for signs and symptoms of infection-fever, chills, redness, swelling, warmth, or drainage to areas.    You may have soreness at injection sites for up to 24 hours.    You may apply ice to the painful areas to help minimize the discomfort of the needle pokes.    Do not apply heat to sites for at least 12 hours.    You may use anti-inflammatory medications or Tylenol for pain control if necessary    Pain Clinic phone number during work hours Monday-Friday:  [phone]    After hours provider line: [phone]        ----------------------------------------------------------------  Clinic Number:  [phone]     Call with any questions about your care and for scheduling assistance.     Calls are returned Monday through Friday between 8 AM and 4:30 PM. We usually get back to you within 2 business days depending on the issue/request.    If we are prescribing your medications:    For opioid medication refills, call the clinic or send a [employer] message 7 days in advance.  Please include:    Name of requested medication    Name of the pharmacy.    For non-opioid medications, call your  pharmacy directly to request a refill. Please allow 3-4 days to be processed.     Per [place] State Law:    All controlled substance prescriptions must be filled within 30 days of being written.      For those controlled substances allowing refills, pickup must occur within 30 days of last fill.      We believe regular attendance is key to your success in our program!      Any time you are unable to keep your appointment we ask that you call us at least 24 hours in advance to cancel.This will allow us to offer the appointment time to another patient.     Multiple missed appointments may lead to dismissal from the clinic.

## 2020-01-09 NOTE — PROGRESS NOTES
"[facility]     Date of visit: 1/9/2020    Chief complaint:   Chief Complaint   Patient presents with     Pain       Interval history:  Becky Lay is a 59 year old female last seen by me on 12/27/19.      Recommendations/plan at the last visit included:   1.  Pain Physical Therapy:  Completed with benefit.    2.  Pain Psychologist to address relaxation, behavioral change, coping style, and other factors important to improvement.  Completed with benefit.    3.  Medication Management:     1. Okay to take ibuprofen, Tylenol, and Flexeril as needed for headaches.     4.  Potential procedures:  occipital nerve blocks (on 11/1) has essentially eliminated daily headaches, continues to have great benefit! I recommend we monitor benefit and repeat as needed. Could add in steroid if needed. Trigger point injections (on 9/23) have greatly reduced muscle tension and neck pain, continues to have excellent benefit. Okay to repeat trigger point injections as needed. Moving forward, she can call or MyChart when feel need to repeat.    5.  Follow up with NANCY Henderson CNP in 8 weeks, okay to push out another 8 weeks if doing well.     Since her last visit, Becky Lay reports:  -Her pain is somewhat worse than it was at last visit.  -She states her headaches \"pressure and fullness in the head\" and aching in her neck gradually started returning on Sunday of last week. She notes yesterday she had the most pain, today less but still bothersome.    -She continued to report good headache benefit from occipital nerve blocks on 11/1/19 until 1/5/20, pain gradually started to return since then.   -She has been taking Flexeril at bedtime since that time, has been taking ibuprofen as needed with some benefit as well.  -She has continued stretching and relaxation exercises with minimal benefit.   -She is leaving on a cruise to the [facility] on Sunday.        Pain Information:   Pain quality: aching and " "tiring   Pain timing: constant     Pain rating: intensity ranges from 0/10 to 4/10, and averages 2/10 on a 0-10 scale.   Aggravating factors include: turning head, movement   Relieving factors include: medicine, ice, injetions    Current pain medications:               Flexeril 10mg prn- SW now takes prn at bedtime as somewhat more effective                Ibuprofen 400mg BID or TID- [facility], not taking regularly              Tylenol 1000mg prn - ?, not taking currently     Current MME: 0    Review of [place] Prescription Monitoring Program (): No concern for abuse or misuse of controlled medications based on this report.     Annual Controlled Substance Agreement/UDS due date: NA    Past pain treatments:  1. Previous Pain Relevant Medications:  NOTE: This medication information taken from patient's intake form, not medical records.               Opiates:  Vicodin- SE, nausea/vomiting, \"no I don't want it\"               NSAIDS: ibuprofen- NH, naproxen- H              Muscle Relaxants: Flexeril- [facility] usually              Anti-migraine mediations: no              Anti-depressants: no              Sleep aids: no              Anxiolytics: no              Neuropathics: no                       Topicals: no              Other medications not covered above: Tylenol- ?, Prednisone- H     2. Physical Therapy: yes prior to surgery and after- NH, practices occasionally, pain physical therapy- [facility]  3. Pain Psychology: yes Meg Hammer [facility]- [facility]  4. Surgery:  C5-6 discectomy and fusion with Dr. Jose at [facility] in March of 2003  5. Injections: bilateral occipital nerve block with Dr. Gross on 11/1/19- H!!!!   trigger point injections with me on 9/23/19- H for neck pain, continue to have some benefit  trigger point injections with me 8/23/19 - H for 2 weeks neck pain and headaches, 2.5 weeks tops  6. Chiropractic: no  7. Acupuncture: no  8. TENS Unit: no    Medications:  Current Outpatient Medications " "  Medication Sig Dispense Refill     acetaminophen (TYLENOL) 500 MG tablet Take 500-1,000 mg by mouth every 6 hours as needed for mild pain       CITRACAL/VITAMIN D PO 2 a day       ibuprofen (ADVIL/MOTRIN) 200 MG tablet Take 200 mg by mouth every 4 hours as needed for mild pain         Medical History: any changes in medical history since they were last seen? No    Review of Systems:  The 14 system ROS was reviewed from the intake questionnaire, and is positive for: headache, nausea, neck pain, joint pain, stiffness, numbness and tingling   Any bowel or bladder problems: denies  Mood: \"same\"     Physical Exam:  Blood pressure 125/86, pulse 77, last menstrual period 06/20/2008, SpO2 98 %, not currently breastfeeding.  General: A&O x4, no signs of distress. Well groomed.   Gait: Normal  Neuro exam: 5/5 upper and lower extremity strength.    Imaging:  MRI of cervical spine was completed on 10/29/19 and shows:        Assessment:   Becky Lay is a 59 year old female with a past medical history significant for hyperhidrosis who presents with complaints of neck pain.      1. Neck pain- etiology likely combination of facet arthropathy and occipital neuralgia, s/p C5-6 discectomy and fusion with Dr. Jose at [facility] in March of 2003  2. Mental Health - the patient's mental health concerns, specifically situational depression, affect her experience of pain and contribute to her clinically significant distress.     1. Bilateral occipital neuralgia    2. Myofascial pain        Plan:     1.  Pain Physical Therapy:     Completed. Continue to practice gentle stretches and exercises.    2.  Pain Psychologist to address relaxation, behavioral change, coping style, and other factors important to improvement.   Completed.   3.  Medication Management:     1. Continue ibuprofen, Tylenol, and Flexeril as needed.    4.  Potential procedures: trigger point injections and occipital nerve blocks completed today. Monitor " benefit.     5.  Follow up with NANCY Henderson CNP in 8 weeks, okay to push out further if doing well.     I spent 30 minutes of time face to face with the patient.  Greater than 50% of this time was spent in patient counseling and/or coordination of care.    Jacquelyn CRUZ CNP  [facility]       [facility] - Procedure Note    Date of Visit: 1/9/2020    Pre procedure Diagnosis: myofascial pain/myositis 60.9   Post procedure Diagnosis: Same  Procedure performed: trigger point injections  Complications: none  Operators: Jacquelyn CRUZ CNP    /86   Pulse 77   LMP 06/20/2008   SpO2 98%     Indications:   Becky Lay is a 59 year old female with a history of neck pain.  Exam shows myofascial pain of the muscle groups listed below and they have tried conservative treatment including PT and medications.    Options/alternatives, benefits and risks were discussed with the patient including bleeding, infection, tissue trauma and pnuemothorax.  Questions were answered to her satisfaction and she agrees to proceed. Voluntary informed consent was obtained and signed.     Vitals allergies and medications were reviewed.    This is a repeat injection. Previous TPI on 8/23/19 was helpful for about a month and on 9/23/19 was helpful for 3-4 months.     Procedure:  After getting informed consent, a Pause for the Cause was performed.    Trigger points were identified by patient, and marked when appropriate.  The area was prepped with Chloroprep.    Using clean technique, injections were completed using a 25G, 1.5 inch needle.  After negative aspiration, injection was completed.  A total of 6 locations were injected.  When possible, tissue was retracted from the chest wall to avoid lung injury.    Muscle groups injected:  Bilateral trapezius, splenius capitis, levator scapulae     Injection solution contained:  10ml of 0.5% bupivacaine.    Hemostasis was  achieved, the area was cleaned, and bandaids were placed when appropriate.  The patient tolerated the procedure well.  Respirations were equal and unlabored.    Follow-up includes:   -f/u with the referring provider  -repeat as needed    Jacquelyn CRUZ CNP  [facility]     Pre procedure Diagnosis: occipital neuralgia   Post procedure Diagnosis: Same  Procedure performed: bilateral occipital nerve blocks  Anesthesia: none  Complications: none  Operators: Jacquelyn CRUZ CNP and Courtney Jett MD    Indications:   Becky Lay is a 59 year old female with a history of neck pain and headche and they have tried conservative treatment including medications and physical therapy.    Options/alternatives, benefits and risks were discussed with the patient including bleeding, infection, hematoma, nerve damage, and stroke, Questions were answered to her satisfaction and she agrees to proceed. Voluntary informed consent was obtained and signed.     Vitals were reviewed: Yes  Allergies were reviewed:  Yes   Medications were reviewed:  Yes   Pre-procedure pain score: 3/10    Procedure:  After getting informed consent, a Pause for the Cause was performed.    The occipital ridge, occipital protuberance, and mastoid process were palpated on the right and left sides. The location of maximal tenderness which was consistent with the location of the greater occipital nerves were palpated.  The area was cleaned.    Palpation for a pulse was completed, with no pulse noted at the site of the injection.  A 25G, 1.5 inch needle was introduced, aimed cephalad, in the superficial tissues at this area of tenderness.  The injection was completed at this location, fanning in 3 different directions.  Aspiration for heme was negative before all injections.    In total, 2.5ml of 0.25% bupivacaine, 2.5ml of 1% lidocaine was injected.     The patient tolerated the procedure well, hemostasis was achieved.       Post-procedure pain score: 0.5/10  Follow-up includes:   -f/u with me as planned    Jacquelyn CRUZ CNP and Domi Gross MD  [facility]

## 2020-01-10 NOTE — PATIENT INSTRUCTIONS
Take full course of Valtrex for shingles  Obtain eye exam due to shingles on face    Okay for tylenol and ibuprofen for discomfort  Okay for benadryl 50 mg every 6 hours as needed for itchiness      Patient Education     Shingles  Shingles is a viral infection caused by the same virus that causes chicken pox. Anyone who has had chicken pox may get shingles later in life. The virus stays in the body, but remains asleep (dormant). Shingles often occurs in older persons or persons with lowered immunity. But it can affect anyone at any age.  Shingles starts as a tingling patch of skin on one side of the body. Small, painful blisters may then appear. The rash rarely spreads to other parts of the body.  Exposure to shingles can't cause shingles. However, it can cause chicken pox in anyone who has not had chicken pox or has not been vaccinated. The contagious period ends when all blisters have crusted over, generally 1 to 2 weeks after the illness starts.  After the blisters heal, the affected skin may be sensitive or painful for weeks or months, gradually resolving over time. But, sometimes this can last longer and be permanent (called postherpetic neuralgia.)  Shingles vaccines are available. Vaccination can help prevent shingles or make it less painful. It is generally recommended for adults older than 50, even if you've had singles in the past. Talk with your healthcare provider about when to get vaccinated and which vaccine is best for you.  Home care    Medicines may be prescribed to help relieve pain. Take these medicines as directed. Ask your healthcare provider or pharmacist before using over-the-counter medicines for helping treat pain and itching.    In certain cases, antiviral medicines may be prescribed to reduce pain, shorten the illness, and prevent neuralgia. Take these medicines as directed.    Compresses made from a solution of cool water mixed with cornstarch or baking soda may help relieve pain and  itching.     Gently wash skin daily with soap and water to help prevent infection. Be certain to rinse off all of the soap, which can be irritating.    Trim fingernails and try not to scratch. Scratching the sores may leave scars.    Stay home from work or school until all blisters have formed a crust and you are no longer contagious.  Follow-up care  Follow up with your healthcare provider, or as directed.  When to seek medical advice    Fever of 100.4 F (38 C) or higher, or as directed by your healthcare provider    Affected skin is on the face or neck and any of the following occur:  ? Headache  ? Eye pain  ? Changes in vision  ? Sores near the eye  ? Weakness of facial muscles    Blisters occurring on new areas of the body    Pain, redness, or swelling of a joint    Signs of skin infection: colored drainage from the sores, warmth, increasing redness, fever, or increasing pain  Date Last Reviewed: 4/1/2018 2000-2019 [employer]. [address] [place] [address], [place] [address]. All rights reserved. This information is not intended as a substitute for professional medical care. Always follow your healthcare professional's instructions.

## 2020-01-10 NOTE — PROGRESS NOTES
SUBJECTIVE:  Chief Complaint   Patient presents with     Urgent Care     Shingles     start yesterday, sx bumps along scalp line, bumps more prominent and underneath right eye, burning sensation,hx occipital neuralgia tx none      Becky Lay is a 59 year old female who presents with a chief complaint of rash on right upper forehead.    Developed rash on right upper scalp/forehead yesterday.  Seems to be worsening, starting to have discomfort around eye.  Currently struggling thru occipital neuralgia and has pain so did not think anything about this until developed rash.  Endorsed having chicken pox when young.  No fever.  Denies any changes in product use.     Past Medical History:   Diagnosis Date     DJD C56,7      Current Outpatient Medications   Medication Sig Dispense Refill     acetaminophen (TYLENOL) 500 MG tablet Take 500-1,000 mg by mouth every 6 hours as needed for mild pain       CITRACAL/VITAMIN D PO 2 a day       cyclobenzaprine (FLEXERIL) 10 MG tablet cyclobenzaprine 10 mg oral tablet PRN   Active       ibuprofen (ADVIL/MOTRIN) 200 MG tablet Take 200 mg by mouth every 4 hours as needed for mild pain       Social History     Tobacco Use     Smoking status: Never Smoker     Smokeless tobacco: Never Used   Substance Use Topics     Alcohol use: Not Currently     Alcohol/week: 10.0 standard drinks     Frequency: 2-4 times a month     Comment: one glass per week       ROS:  Review of systems negative except as stated above.    EXAM:   /81   Pulse 81   Temp 98.6  F (37  C) (Oral)   Wt 59 kg (130 lb)   LMP 06/20/2008   SpO2 98%   BMI 22.31 kg/m    GENERAL APPEARANCE: healthy, alert and no distress  EYES: PERRL, EOM intact, conjunctiva clear  SKIN: upper forehead/scalp on right side with clustering blisters, no pustules, no scaling  PSYCH: alert, affect bright      ASSESSMENT/PLAN:  (B02.9) Herpes zoster without complication  (primary encounter diagnosis)  Comment: right scalp/forehead  Plan:  valACYclovir (VALTREX) 1000 mg tablet            Suspect shingles, RX Valtrex given, okay for tylenol and ibuprofen for discomfort.  Discussed that due to location on face that needs to have eye exam by eye doctor.  Okay for benadryl to help with itchiness and encourage to refrain from scratching.  Monitor for skin infection.  Patient to avoid individuals that has not had chicken pox vaccination or chickenpox already.  Discussed possible post herpetic neuralgia.    Needs to have eye exam within 1-2 days/    Follow up with primary provider if no improvement of symptoms within 1 week    Jose Elias Borja MD

## 2020-01-24 ENCOUNTER — OFFICE VISIT (OUTPATIENT)
Dept: URGENT CARE | Facility: URGENT CARE | Age: 60
End: 2020-01-24
Payer: COMMERCIAL

## 2020-01-24 VITALS
DIASTOLIC BLOOD PRESSURE: 82 MMHG | SYSTOLIC BLOOD PRESSURE: 126 MMHG | TEMPERATURE: 98.3 F | OXYGEN SATURATION: 98 % | WEIGHT: 130 LBS | HEART RATE: 94 BPM | BODY MASS INDEX: 22.31 KG/M2

## 2020-01-24 DIAGNOSIS — B37.0 ORAL THRUSH: Primary | ICD-10-CM

## 2020-01-24 PROCEDURE — 99213 OFFICE O/P EST LOW 20 MIN: CPT | Performed by: PHYSICIAN ASSISTANT

## 2020-01-24 RX ORDER — NYSTATIN 100000/ML
500000 SUSPENSION, ORAL (FINAL DOSE FORM) ORAL 4 TIMES DAILY
Qty: 400 ML | Refills: 0 | Status: SHIPPED | OUTPATIENT
Start: 2020-01-24 | End: 2020-01-31

## 2020-01-24 NOTE — PROGRESS NOTES
CHIEF COMPLAINT:   Chief Complaint   Patient presents with     Urgent Care     Oral Swelling     slight tongue swelling, white coating on tongue x 3-4 days- mconcerned about possible thrush       HPI: Becky Lay is a 59 year old female who presents to clinic today for evaluation of oral thrush. Patient reports that 5 days ago she developed tongue pain along with pain of oral mucosa.  She had her daughter looked at it and noted a beefy red bucca mucosa.  Has noticed a white coating on her tongue.  Recently treated for shingles 1 week ago with valacyclovir, was also taking Benadryl and Flexeril for headaches.  No new offending agents.  She has not had fever, chills, difficulty swallowing or breathing, hives or syncope.    Past Medical History:   Diagnosis Date     DJD C56,7      Past Surgical History:   Procedure Laterality Date     C VASQUES W/O FACETEC FORAMOT/DSKC 1/2 VRT SEG, CERVICAL  2004    anterior c- 5, 6 fusion     Social History     Tobacco Use     Smoking status: Never Smoker     Smokeless tobacco: Never Used   Substance Use Topics     Alcohol use: Not Currently     Alcohol/week: 10.0 standard drinks     Frequency: 2-4 times a month     Comment: one glass per week     Current Outpatient Medications   Medication     acetaminophen (TYLENOL) 500 MG tablet     CITRACAL/VITAMIN D PO     cyclobenzaprine (FLEXERIL) 10 MG tablet     ibuprofen (ADVIL/MOTRIN) 200 MG tablet     valACYclovir (VALTREX) 1000 mg tablet     No current facility-administered medications for this visit.      Allergies   Allergen Reactions     No Known Allergies        10 point ROS of systems including Constitutional, Eyes, Respiratory, Cardiovascular, Gastroenterology, Genitourinary, Integumentary, Muscularskeletal, Psychiatric were all negative except for pertinent positives noted in my HPI.        Exam:  /82   Pulse 94   Temp 98.3  F (36.8  C) (Tympanic)   Wt 59 kg (130 lb)   LMP 06/20/2008   SpO2 98%   BMI 22.31 kg/m     Constitutional: healthy, alert and no distress  Head: Normocephalic, atraumatic.  Eyes: conjunctiva clear, no drainage  ENT: TMs clear and shiny abdelrahman, nasal mucosa pink and moist, throat without tonsillar hypertrophy. She has several white patches on her buccal mucosa c/w thrush.   Neck: neck is supple, no cervical lymphadenopathy or nuchal rigidity  Cardiovascular: RRR  Respiratory: CTA bilaterally, no rhonchi or rales  Skin: no rashes  Neurologic: Speech clear, gait normal. Moves all extremities.      ASSESSMENT/PLAN:  1. Oral thrush  Exam and history c/w oral thrush. Unclear offender. Will start her on nystatin. Encouraged salt water gargles as well.   Follow-up in clinic if no improvement.   - nystatin (MYCOSTATIN) 385908 UNIT/ML suspension; Swish and spit 5 mLs (500,000 Units) in mouth 4 times daily for 7 days  Dispense: 400 mL; Refill: 0    Diagnosis and treatment plan was reviewed with patient and/or family.   We went over any labs or imaging. Discussed worsening symptoms or little to no relief despite treatment plan to follow-up with PCP or return to clinic.  Patient verbalizes understanding. All questions were addressed and answered.   Danna Landa PA-C

## 2020-05-04 ENCOUNTER — TELEPHONE (OUTPATIENT)
Dept: PALLIATIVE MEDICINE | Facility: CLINIC | Age: 60
End: 2020-05-04

## 2020-05-04 NOTE — TELEPHONE ENCOUNTER
Pt calling to see if she can schedule repeat TPI. Routing to provider to review.    Hanna OCHOA    [facility] Pain Management

## 2020-05-04 NOTE — TELEPHONE ENCOUNTER
Called patient. She states that last injection she did not have much benefit. She reports some relief but not as good as prior. Lately she has been using  More flexeril. Used 4 tabs flexeril last week. Has already used tabs 3 this week. She has been taking 2 advil everyday and she does not want to have to take Advil everyday.     States is not severe pain but it gradually is returning from her previous injection. Injection in Nov was better than January. Advised that will route for review. She would like to discuss other options she may have, advised that to discuss plan of care she should make a follow up appt to discuss. She will call back to make this appointment.     Alyssa FLOWERS, RN Care Coordinator  [facility]  Pain Management

## 2020-05-04 NOTE — TELEPHONE ENCOUNTER
Can we call Becky Montenegro to find out a bit more? Is this a gradual return of her neck pain and headaches? How long did trigger point injections and occipital nerve block in January provide benefit? If gradual return of symptoms, likely not essential and we may need to wait a bit before repeating due to COVID-19 restrictions. If pain is becoming more severe however we could repeat injections now.     NANCY Henderson CNP  [facility] Pain Management

## 2020-05-04 NOTE — TELEPHONE ENCOUNTER
Thank you, information noted.  Agree with plan for follow up appointment.    NANCY Henderson [facility] Pain Management

## 2020-05-11 NOTE — TELEPHONE ENCOUNTER
No follow up scheduled yet with Jacquelyn Nj.    Ursula Marquez RN  Care Coordinator  [facility] Pain [facility]

## 2020-05-21 NOTE — TELEPHONE ENCOUNTER
Scheduled 05/28-closing    Alyssa HASTINGSN, RN Care Coordinator  [facility]  Pain [facility]

## 2020-05-26 NOTE — PROGRESS NOTES
"Becky Lay is a 59 year old female who is being evaluated via a billable telephone visit.      The patient has been notified of following:     \"This telephone visit will be conducted via a call between you and your physician/provider. We have found that certain health care needs can be provided without the need for a physical exam.  This service lets us provide the care you need with a short phone conversation.  If a prescription is necessary we can send it directly to your pharmacy.  If lab work is needed we can place an order for that and you can then stop by our lab to have the test done at a later time.    Telephone visits are billed at different rates depending on your insurance coverage. During this emergency period, for some insurers they may be billed the same as an in-person visit.  Please reach out to your insurance provider with any questions.    If during the course of the call the physician/provider feels a telephone visit is not appropriate, you will not be charged for this service.\"    Patient has given verbal consent for Telephone visit?  Yes    What phone number would you like to be contacted at? [phone]    How would you like to obtain your AVS? Niurka    Recommendations at last vist on 1/19/2020:             1.  Pain Physical Therapy:                Completed. Continue to practice gentle stretches and exercises.               2.  Pain Psychologist to address relaxation, behavioral change, coping style, and other factors important to improvement.       Completed.              3.  Medication Management:                           1. Continue ibuprofen, Tylenol, and Flexeril as needed.               4.  Potential procedures: trigger point injections and occipital nerve blocks completed today. Monitor benefit.                5.  Follow up with NANCY Henderson CNP in 8 weeks, okay to push out further if doing well.     Additional provider notes:  -Her pain is worse than it was at last " visit.   -Her headaches and neck pain/tightness are equally bothersome.   -She has had to increase frequency of Flexeril and ibuprofen for management of pain. She has had to take Flexeril 10x and ibuprofen 6x since 5/4/2020 and this concerns her. She does not like to take medications if she does not have to but does state that these medications are effective. She takes a 1/2 tab of Flexeril if she takes in the morning but a full tablet in the evening.    -She had follow up scheduled in February but as she was feeling better pushed the appointment back to March. She then was not able to be seen because of COVID-19 restrictions.   -She had trigger point injections and a bilateral occipital nerve block with me on 1/19/2020. She reports benefit from both of these injections but less so than occipital nerve blocks in November. States she had EXCELLENT pain benefit from injections in November, had minimal pain for 2 months. Previous trigger point injections was in September.       Assessment:   Becky Lay is a 59 year old female with a past medical history significant for hyperhidrosis who presents with complaints of neck pain.      1. Neck pain- etiology likely combination of facet arthropathy and occipital neuralgia, s/p C5-6 discectomy and fusion with Dr. Jose at [facility] in March of 2003  2. Mental Health - the patient's mental health concerns, specifically situational depression, affect her experience of pain and contribute to her clinically significant distress.     1. Bilateral occipital neuralgia    2. Myofascial pain        Plan:     1.  Pain Physical Therapy:                Completed. Continue to practice gentle stretches and exercises.               2.  Pain Psychologist to address relaxation, behavioral change, coping style, and other factors important to improvement.          Completed.              3.  Medication Management:                           1.Becky reports some concern about  increased need for Flexeril and ibuprofen but is still taking well below maximum dosing. I reassured her that it is safe to continue increased use of ibuprofen and Flexeril for now until able to repeat injections.               4.  Potential procedures: repeat trigger point injections and occipital nerve blocks ordered. We will complete with a small increase in amount of anesthetic for occipital nerve blocks (occipital nerve blocks in November with 3ml injected bilaterally where injection in January with 2.5ml bilaterally).               5.  Follow up with NANCY Henderson CNP in 8 weeks, okay to push out further if doing well.     Phone call duration: 15 minutes    NANCY Singletary CNP

## 2020-05-28 ENCOUNTER — VIRTUAL VISIT (OUTPATIENT)
Dept: PALLIATIVE MEDICINE | Facility: CLINIC | Age: 60
End: 2020-05-28
Payer: COMMERCIAL

## 2020-05-28 DIAGNOSIS — M79.18 MYOFASCIAL PAIN: ICD-10-CM

## 2020-05-28 DIAGNOSIS — M54.81 BILATERAL OCCIPITAL NEURALGIA: Primary | ICD-10-CM

## 2020-05-28 PROCEDURE — 99213 OFFICE O/P EST LOW 20 MIN: CPT | Mod: TEL | Performed by: NURSE PRACTITIONER

## 2020-05-28 RX ORDER — CYCLOBENZAPRINE HCL 10 MG
5-10 TABLET ORAL 3 TIMES DAILY PRN
Qty: 60 TABLET | Refills: 0 | Status: SHIPPED | OUTPATIENT
Start: 2020-05-28 | End: 2020-09-21 | Stop reason: SINTOL

## 2020-05-28 NOTE — PATIENT INSTRUCTIONS
1.  Pain Physical Therapy:                Completed. Continue to practice gentle stretches and exercises.               2.  Pain Psychologist to address relaxation, behavioral change, coping style, and other factors important to improvement.          Completed.              3.  Medication Management:                           1. It is okay to continue increased use of ibuprofen and Flexeril for now until able to repeat injections. You are taking safe doses of these medications.               4.  Potential procedures: trigger point injections and occipital nerve blocks ordered. We will complete with a small increase in amount of anesthetic for occipital nerve blocks.               5.  Follow up with NANCY Henderson CNP in 8 weeks, okay to push out further if doing well.

## 2020-05-28 NOTE — PROGRESS NOTES
SUBJECTIVE:   CC: Becky Lay is an 59 year old woman who presents for preventive health visit.     Healthy Habits:     Getting at least 3 servings of Calcium per day:  Yes    Bi-annual eye exam:  NO    Dental care twice a year:  Yes    Sleep apnea or symptoms of sleep apnea:  None    Diet:  Regular (no restrictions)    Frequency of exercise:  4-5 days/week    Duration of exercise:  30-45 minutes    Taking medications regularly:  Not Applicable    Barriers to taking medications:  Not applicable    Medication side effects:  Not applicable    PHQ-2 Total Score: 0    Additional concerns today:  No    Had trigger point injections with good relief over the past 3.5 wks, but it's coming back.     Today's PHQ-2 Score:   PHQ-2 ( 1999 Pfizer) 9/17/2019   Q1: Little interest or pleasure in doing things 0   Q2: Feeling down, depressed or hopeless 0   PHQ-2 Score 0   Q1: Little interest or pleasure in doing things Not at all   Q2: Feeling down, depressed or hopeless Not at all   PHQ-2 Score 0       Abuse: Current or Past(Physical, Sexual or Emotional)- No  Do you feel safe in your environment? Yes    Social History     Tobacco Use     Smoking status: Never Smoker     Smokeless tobacco: Never Used   Substance Use Topics     Alcohol use: Yes     Alcohol/week: 6.0 oz     Comment: one glass per week       Alcohol Use 9/17/2019   Prescreen: >3 drinks/day or >7 drinks/week? No   Prescreen: >3 drinks/day or >7 drinks/week? -       Reviewed orders with patient.  Reviewed health maintenance and updated orders accordingly - Yes  Lab work is in process    Mammogram Screening: Patient over age 50, mutual decision to screen reflected in health maintenance.    Pertinent mammograms are reviewed under the imaging tab.  History of abnormal Pap smear: NO - age 30-65 PAP every 5 years with negative HPV co-testing recommended  PAP / HPV 7/23/2014 8/18/2010 7/11/2007   PAP NIL NIL NIL     Reviewed and updated as needed this visit by  Dict # [id number]  Brief Postoperative Note    Patient: Sabas Sidhu  YOB: 1994  MRN: [id number]    Date of Procedure: 5/28/2020     Pre-Op Diagnosis: FIBROIDS, Menorrhagia    Post-Op Diagnosis: Same as preoperative diagnosis.       Procedure(s):  EXPLORATORY LAPAROTOMY MYOMECTOMY    Surgeon(s):  Ciara Ross MD    Surgical Assistant: see OR records    Anesthesia: General     Estimated Blood Loss (mL): 8780    Complications: None    Specimens:   ID Type Source Tests Collected by Time Destination   1 : UTERINE FIBROID Frozen Section Uterus  Ciara Ross MD 5/28/2020 8440 Pathology   2 : UTERINE FIBROIDS Preservative Uterus  Ciara Ross MD 5/28/2020 1009 Pathology        Implants: * No implants in log *    Drains: * No LDAs found *    Findings: 15 cm uterine fibroid    Electronically Signed by Graciela Camara MD on 5/28/2020 at 11:13 AM "clinical staff  Tobacco  Allergies  Meds  Problems  Med Hx  Surg Hx  Fam Hx  Soc Hx          Reviewed and updated as needed this visit by Provider  Tobacco  Allergies  Meds  Problems  Med Hx  Surg Hx  Fam Hx            Review of Systems   Constitutional: Negative for chills and fever.   HENT: Negative for congestion, ear pain, hearing loss and sore throat.    Eyes: Negative for pain and visual disturbance.   Respiratory: Negative for cough and shortness of breath.    Cardiovascular: Negative for chest pain, palpitations and peripheral edema.   Gastrointestinal: Negative for abdominal pain, constipation, diarrhea, heartburn, hematochezia and nausea.   Breasts:  Negative for tenderness, breast mass and discharge.   Genitourinary: Negative for dysuria, frequency, genital sores, hematuria, pelvic pain, urgency, vaginal bleeding and vaginal discharge.   Musculoskeletal: Positive for arthralgias. Negative for joint swelling and myalgias.   Skin: Negative for rash.   Neurological: Positive for headaches. Negative for dizziness, weakness and paresthesias.   Psychiatric/Behavioral: Negative for mood changes. The patient is not nervous/anxious.         OBJECTIVE:   /73   Pulse 90   Temp 99.1  F (37.3  C) (Tympanic)   Ht 1.645 m (5' 4.75\")   Wt 60 kg (132 lb 3.2 oz)   LMP 06/20/2008   SpO2 97%   BMI 22.17 kg/m    Physical Exam  GENERAL: healthy, alert and no distress  EYES: Eyes grossly normal to inspection, PERRL and conjunctivae and sclerae normal  HENT: ear canals and TM's normal, nose and mouth without ulcers or lesions  NECK: no adenopathy, no asymmetry, masses, or scars and thyroid normal to palpation  RESP: lungs clear to auscultation - no rales, rhonchi or wheezes  CV: regular rate and rhythm, normal S1 S2, no S3 or S4, no murmur, click or rub, no peripheral edema and peripheral pulses strong  ABDOMEN: soft, nontender, no hepatosplenomegaly, no masses and bowel sounds normal   (female): " "normal female external genitalia, normal urethral meatus, vaginal mucosa, normal cervix/adnexa/uterus without masses or discharge  MS: no gross musculoskeletal defects noted, no edema  PSYCH: mentation appears normal, affect normal/bright    ASSESSMENT/PLAN:   1. Routine general medical examination at a health care facility      2. Visit for screening mammogram    - *MA Screening Digital Bilateral; Future    3. Screening for malignant neoplasm of cervix    - Pap imaged thin layer screen with HPV - recommended age 30 - 65  - HPV High Risk Types DNA Cervical    4. Post-menopausal atrophic vaginitis  Diagnosis reviewed upon exam today. She admits she does tend to have painful intercourse, dryness and irritation, urinary symptoms. We reviewed hot to use cream, prescription given. She armando lFU prn.   - estradiol (ESTRACE VAGINAL) 0.1 MG/GM vaginal cream; Place 2 g vaginally twice a week  Dispense: 42.5 g; Refill: 3    5. Acute bilateral low back pain without sciatica  Uses very infrequently.   - cyclobenzaprine (FLEXERIL) 10 MG tablet; Take 1 tablet (10 mg) by mouth 3 times daily as needed for muscle spasms  Dispense: 45 tablet; Refill: 1    COUNSELING:  Reviewed preventive health counseling, as reflected in patient instructions  Special attention given to:        Regular exercise       Healthy diet/nutrition       Osteoporosis Prevention/Bone Health       Safe sex practices/STD prevention       (Trudy)menopause management    Estimated body mass index is 22.17 kg/m  as calculated from the following:    Height as of this encounter: 1.645 m (5' 4.75\").    Weight as of this encounter: 60 kg (132 lb 3.2 oz).         reports that she has never smoked. She has never used smokeless tobacco.      Counseling Resources:  ATP IV Guidelines  Pooled Cohorts Equation Calculator  Breast Cancer Risk Calculator  FRAX Risk Assessment  ICSI Preventive Guidelines  Dietary Guidelines for Americans, 2010  USDA's MyPlate  ASA Prophylaxis  Lung " CA Screening    Paty Mason, NANCY [facility] SANDIE

## 2020-05-29 NOTE — PROGRESS NOTES
[facility] - Procedure Note    Date of Visit: 6/1/2020    Pre procedure Diagnosis: myofascial pain/myositis 60.9   Post procedure Diagnosis: Same  Procedure performed: trigger point injections  Complications: none  Operators: Jacquelyn Nj APRN CNP    BP 96/67   Pulse 94   LMP 06/20/2008   SpO2 97%     Indications:   Becky Lay is a 59 year old female with a history of neck/shoulder pain.  Exam shows myofascial pain of the muscle groups listed below and they have tried conservative treatment including PT and medications.    Options/alternatives, benefits and risks were discussed with the patient including bleeding, infection, tissue trauma and pnuemothorax.  Questions were answered to her satisfaction and she agrees to proceed. Voluntary informed consent was obtained and signed.     Vitals allergies and medications were reviewed.    This is a repeat injection. Previous TPI on 9/23/2020 was helpful for a couple of months. Previous TPI on 1/19/2020 was helpful for 2-3 months, states she continues to have less severe pain.     Procedure:  After getting informed consent, a Pause for the Cause was performed.    Trigger points were identified by patient, and marked when appropriate.  The area was prepped with Chloroprep.    Using clean technique, injections were completed using a 25G, 1.5 inch needle.  After negative aspiration, injection was completed.  A total of 6 locations were injected.  When possible, tissue was retracted from the chest wall to avoid lung injury.    Muscle groups injected:  Bilateral trapezius, splenius capitis, and levator scapulae.       Injection solution contained:  5ml of 0.5% bupivacaine and 5ml of 1% lidocaine.     Hemostasis was achieved, the area was cleaned, and bandaids were placed when appropriate.  The patient tolerated the procedure well.  Breath sounds were normal.      Follow-up includes:   -f/u with the referring provider  -repeat as  "needed      Jacquelyn CRUZ CNP        [facility]    Pre procedure Diagnosis: occipital neuralgia   Post procedure Diagnosis: Same  Procedure performed: bilateral occipital nerve blocks  Anesthesia: none  Complications: none  Operators: Jacquelyn CRUZ CNP     Indications:   Becky Lay is a 59 year old female with a history of neck pain and posterior headaches.  They have tried conservative treatment including medications and trigger point injections.    This is a repeat occipital nerve block, previous injections on 11/1/19 was VERY helpful for 2 months, \"those are the two best months I've had\" and on 1/19/2020 was somewhat helpful for 2 months.      Options/alternatives, benefits and risks were discussed with the patient including bleeding, infection, hematoma, nerve damage, and stroke, Questions were answered to her satisfaction and she agrees to proceed. Voluntary informed consent was obtained and signed.     Vitals were reviewed: Yes  Allergies were reviewed:  Yes   Medications were reviewed:  Yes   Pre-procedure pain score: 2-3/10    Procedure:  After getting informed consent, a Pause for the Cause was performed.    The occipital ridge, occipital protuberance, and mastoid process were palpated on the left and right side. The location of maximal tenderness, which was consistent with the location of the greater occipital nerves, were palpated.  The area was cleaned.    Palpation for a pulse was completed, with no pulse noted at the site of the injection.  A 25G, 1.5 inch needle was introduced, aimed cephalad, in the superficial tissues at this area of tenderness.  The injection was completed at this location, fanning in 3 different directions.  Aspiration for heme was negative before all injections.    In total, 3ml of 0.5% bupivacaine, 3ml of 1% lidocaine was injected.     The patient tolerated the procedure well, hemostasis was achieved.      Post-procedure pain score: " 0.5/10    Follow-up includes:   -f/u with NANCY Henderson CNP  -schedule prn    Jacquelyn CRUZ CNP   [facility]

## 2020-06-01 ENCOUNTER — OFFICE VISIT (OUTPATIENT)
Dept: PALLIATIVE MEDICINE | Facility: CLINIC | Age: 60
End: 2020-06-01
Payer: COMMERCIAL

## 2020-06-01 VITALS — OXYGEN SATURATION: 97 % | DIASTOLIC BLOOD PRESSURE: 67 MMHG | SYSTOLIC BLOOD PRESSURE: 96 MMHG | HEART RATE: 94 BPM

## 2020-06-01 DIAGNOSIS — M79.18 MYOFASCIAL PAIN: ICD-10-CM

## 2020-06-01 DIAGNOSIS — M54.81 BILATERAL OCCIPITAL NEURALGIA: Primary | ICD-10-CM

## 2020-06-01 PROCEDURE — 20553 NJX 1/MLT TRIGGER POINTS 3/>: CPT | Mod: 59 | Performed by: NURSE PRACTITIONER

## 2020-06-01 PROCEDURE — 64405 NJX AA&/STRD GR OCPL NRV: CPT | Mod: 50 | Performed by: NURSE PRACTITIONER

## 2020-06-01 RX ORDER — BUPIVACAINE HYDROCHLORIDE 5 MG/ML
3 INJECTION, SOLUTION EPIDURAL; INTRACAUDAL ONCE
Status: COMPLETED | OUTPATIENT
Start: 2020-06-01 | End: 2020-06-01

## 2020-06-01 RX ORDER — BUPIVACAINE HYDROCHLORIDE 5 MG/ML
5 INJECTION, SOLUTION EPIDURAL; INTRACAUDAL ONCE
Status: COMPLETED | OUTPATIENT
Start: 2020-06-01 | End: 2020-06-01

## 2020-06-01 RX ADMIN — BUPIVACAINE HYDROCHLORIDE 25 MG: 5 INJECTION, SOLUTION EPIDURAL; INTRACAUDAL at 13:45

## 2020-06-01 RX ADMIN — BUPIVACAINE HYDROCHLORIDE 15 MG: 5 INJECTION, SOLUTION EPIDURAL; INTRACAUDAL at 13:45

## 2020-06-01 ASSESSMENT — PAIN SCALES - GENERAL: PAINLEVEL: MILD PAIN (2)

## 2020-06-01 NOTE — PATIENT INSTRUCTIONS
[facility]   Post Procedure Instructions    Today you had:  trigger point injections and occipital nerve block          Medications used:  lidocaine and  bupivicaine           Go to the emergency room if you develop any shortness of breath    Monitor the injection sites for signs and symptoms of infection-fever, chills, redness, swelling, warmth, or drainage to areas.    You may have soreness at injection sites for up to 24 hours.    You may apply ice to the painful areas to help minimize the discomfort of the needle pokes.    Do not apply heat to sites for at least 12 hours.    You may use anti-inflammatory medications or Tylenol for pain control if necessary    Pain Clinic phone number during work hours Monday-Friday:  [phone]    After hours provider line: [phone]

## 2020-06-16 ENCOUNTER — HOSPITAL ENCOUNTER (EMERGENCY)
Facility: CLINIC | Age: 60
Discharge: HOME | End: 2020-06-16
Attending: EMERGENCY MEDICINE | Admitting: EMERGENCY MEDICINE
Payer: COMMERCIAL

## 2020-06-16 VITALS
SYSTOLIC BLOOD PRESSURE: 123 MMHG | TEMPERATURE: 98.3 F | DIASTOLIC BLOOD PRESSURE: 82 MMHG | OXYGEN SATURATION: 99 % | HEART RATE: 75 BPM | RESPIRATION RATE: 16 BRPM

## 2020-06-16 DIAGNOSIS — R51.9 NONINTRACTABLE HEADACHE, UNSPECIFIED CHRONICITY PATTERN, UNSPECIFIED HEADACHE TYPE: ICD-10-CM

## 2020-06-16 PROCEDURE — 96361 HYDRATE IV INFUSION ADD-ON: CPT

## 2020-06-16 PROCEDURE — 25800030 ZZH RX IP 258 OP 636: Performed by: EMERGENCY MEDICINE

## 2020-06-16 PROCEDURE — 99284 EMERGENCY DEPT VISIT MOD MDM: CPT | Mod: 25

## 2020-06-16 PROCEDURE — 96375 TX/PRO/DX INJ NEW DRUG ADDON: CPT

## 2020-06-16 PROCEDURE — 25000128 H RX IP 250 OP 636: Performed by: EMERGENCY MEDICINE

## 2020-06-16 PROCEDURE — 96374 THER/PROPH/DIAG INJ IV PUSH: CPT

## 2020-06-16 RX ORDER — PROCHLORPERAZINE MALEATE 10 MG
10 TABLET ORAL EVERY 6 HOURS PRN
Qty: 6 TABLET | Refills: 0 | Status: SHIPPED | OUTPATIENT
Start: 2020-06-16 | End: 2020-08-25

## 2020-06-16 RX ORDER — DIPHENHYDRAMINE HYDROCHLORIDE 50 MG/ML
50 INJECTION INTRAMUSCULAR; INTRAVENOUS ONCE
Status: COMPLETED | OUTPATIENT
Start: 2020-06-16 | End: 2020-06-16

## 2020-06-16 RX ORDER — DEXAMETHASONE SODIUM PHOSPHATE 10 MG/ML
10 INJECTION, SOLUTION INTRAMUSCULAR; INTRAVENOUS ONCE
Status: COMPLETED | OUTPATIENT
Start: 2020-06-16 | End: 2020-06-16

## 2020-06-16 RX ORDER — SODIUM CHLORIDE 9 MG/ML
1000 INJECTION, SOLUTION INTRAVENOUS CONTINUOUS
Status: DISCONTINUED | OUTPATIENT
Start: 2020-06-16 | End: 2020-06-16 | Stop reason: HOSPADM

## 2020-06-16 RX ADMIN — DEXAMETHASONE SODIUM PHOSPHATE 10 MG: 10 INJECTION, SOLUTION INTRAMUSCULAR; INTRAVENOUS at 08:52

## 2020-06-16 RX ADMIN — SODIUM CHLORIDE 1000 ML: 9 INJECTION, SOLUTION INTRAVENOUS at 08:52

## 2020-06-16 RX ADMIN — DIPHENHYDRAMINE HYDROCHLORIDE 50 MG: 50 INJECTION, SOLUTION INTRAMUSCULAR; INTRAVENOUS at 08:52

## 2020-06-16 RX ADMIN — PROCHLORPERAZINE EDISYLATE 10 MG: 5 INJECTION INTRAMUSCULAR; INTRAVENOUS at 08:52

## 2020-06-16 NOTE — ED NOTES
All patient questions have been answered, no further questions at this time. Discharge paperwork was gone over with patient. Patient verbalizes understanding of discharge teaching, medications, when to return and the importance of follow up.  Patient verbalizes feeling safe returning home at this time.

## 2020-06-16 NOTE — DISCHARGE INSTRUCTIONS
Please follow up with your PCP in 1-2 days for a recheck.  Return if worsening headache, vision changes, fever >101, neck stiffness, intractable nausea or vomiting or other acute changes.      Discharge Instructions  Headache    You were seen today for a headache. Headaches may be caused by many different things such as muscle tension, sinus inflammation, anxiety and stress, having too little sleep, too much alcohol, some medical conditions or injury. You may have a migraine, which is caused by changes in the blood vessels in your head.  At this time your provider does not find that your headache is a sign of anything dangerous or life-threatening.  However, sometimes the signs of serious illness do not show up right away.      Generally, every Emergency Department visit should have a follow-up clinic visit with either a primary or a specialty clinic/provider. Please follow-up as instructed by your emergency provider today.    Return to the Emergency Department if:  You get a new fever of 100.4 F or higher.  Your headache gets much worse.  You get a stiff neck with your headache.  You get a new headache that is significantly different or worse than headaches you have had before.  You are vomiting (throwing up) and cannot keep food or water down.  You have blurry or double vision or other problems with your eyes.  You have a new weakness on one side of your body.  You have difficulty with balance which is new.  You or your family thinks you are confused.  You have a seizure.    What can I do to help myself?  Pain medications - You may take a pain medication such as Tylenol  (acetaminophen), Advil , Motrin  (ibuprofen) or Aleve  (naproxen).  Take a pain reliever as soon as you notice symptoms.  Starting medications as soon as you start to have symptoms may lessen the amount of pain you have.  Relaxing in a quiet, dark room may help.  Get enough sleep and eat meals regularly.  You may need to watch for certain foods or  other things which may trigger your headaches.  Keeping a journal of your headaches and possible triggers may help you and your primary provider to identify things which you should avoid which may be causing your headaches.  If you were given a prescription for medicine here today, be sure to read all of the information (including the package insert) that comes with your prescription.  This will include important information about the medicine, its side effects, and any warnings that you need to know about.  The pharmacist who fills the prescription can provide more information and answer questions you may have about the medicine.  If you have questions or concerns that the pharmacist cannot address, please call or return to the Emergency Department.   Remember that you can always come back to the Emergency Department if you are not able to see your regular provider in the amount of time listed above, if you get any new symptoms, or if there is anything that worries you.

## 2020-06-16 NOTE — ED PROVIDER NOTES
History     Chief Complaint:  Headache      HPI   Becky Lay is a 59 year old female who presents with headache. Patient went in to the pain clinic 2 weeks ago for injections in her neck for pain, but they haven't helped with her headaches.  She notes yesterday she took ibuprofen 3 times and some Advil, but that didn't help her pain either. She also notes some nausea with her symptoms. She states she has had chronic pain and this pain often results in headaches. She denies vomiting, neck pain, fevers, chills, chest pain, trouble breathing, trauma, falls, reactions to injection spots, numbness, tingling, weakness, double vision, or changes in vision. She took flexeril yesterday and notes that it helps but doesn't make the pain or headaches go away. She notes she took 2 tablets of ibuprofen this morning at 0730. She has not been in contact with the pain clinic to notify them of new symptoms.     Allergies:  The patient has no known drug allergies.    Medications:    Valtrex  Flexeril    Past Medical History:    DJD C5,6,7  Cervicalgia  Generalized hyperhidrosis    Past Surgical History:    Anterior c-5,6 fusion    Family History:    Mother: Osteoporosis, Lipids, Dementia  Father: Heart disease    Social History:  Denies tobacco use  Denies current alcohol use  Denies drug use  Marital Status:   [2]    Review of Systems   All other systems reviewed and are negative.      Physical Exam   First Vitals:  BP: 129/85  Pulse: 99  Heart Rate: 99  Temp: 98.3  F (36.8  C)  Resp: 16  SpO2: 100 %      Physical Exam  General: Resting on the bed.  Head: No obvious trauma to head.  Ears, Nose, Throat:  External ears normal.  Nose normal.    Eyes:  Conjunctivae clear.  Pupils are equal, round, and reactive.  EOMI.    Neck: Normal range of motion.  Neck supple.  no nuchal rigidity   CV: Regular rate and rhythm.  No murmurs.      Respiratory: Effort normal and breath sounds normal.  No wheezing or crackles.    Gastrointestinal: Soft.  No distension. There is no tenderness.  There is no rigidity, no rebound and no guarding.   Neuro: Alert. Moving all extremities appropriately.  Normal speech.  CN II-XII grossly intact, no pronator drift, normal finger-nose-finger.  Gross muscle strength intact of the proximal and distal bilateral upper and lower extremities.  Sensation intact to light touch in all 4 extremities.  2+ patellar reflexes.  Normal gait.    Skin: Skin is warm and dry.  No rash noted.     Emergency Department Course   Interventions:  0852 NS 1000 mL IV  0852 Compazine 10 mg IV  0852 Benadryl 50 mg IV  0852 Decadron 10 mg IV    Emergency Department Course:  Nursing notes and vitals reviewed. (0842) I performed an exam of the patient as documented above.     IV inserted. Medicine administered as documented above.    0943 I rechecked the patient and discussed the results of her workup thus far.     Findings and plan explained to the Patient. Patient discharged home with instructions regarding supportive care, medications, and reasons to return. The importance of close follow-up was reviewed. The patient was prescribed Compazine.    Impression & Plan      Medical Decision Makin-year-old female with history of chronic occipital neuralgia and headaches presents with headache.  Vital signs are reassuring.  Broad differentials pursued include not limited to subarachnoid, meningitis encephalitis, trauma, stroke, electrolyte, metabolic, renal dysfunction, chronic headache, migraine, dissection, etc.  Overall patient has reassuring neurologic exam.  No evidence acute neurologic finding.  No suggestion of stroke, mass, tumor.  No falls or trauma.  No indication for acute neurologic imaging based on history and symptoms.  No thunderclap headache, no worst headache of life, no evidence of subarachnoid.  No fevers, no neck stiffness or pain, no evidence of meningitis or encephalitis.  Patient has had history with prior  migraines in addition to her chronic headaches.  I gave headache cocktail and she felt much improved.  She continues to follow with the pain clinic and was encouraged to do so regarding her ongoing headache issues.  She feels comfortable at home.  Her daughter came to pick her up.  No change from prior history and this seems consistent with known history of headache disorder.  At this point she seems reassured by work-up.  We discussed imaging and she agrees with foregoing at this time.  I do not see indication for LP or other more invasive work-up.  Patient was discharged home.  Return precautions were discussed.      Diagnosis:    ICD-10-CM    1. Nonintractable headache, unspecified chronicity pattern, unspecified headache type  R51      Disposition:  discharged to home with prescription for Compazine.    Discharge Medications:  New Prescriptions    PROCHLORPERAZINE (COMPAZINE) 10 MG TABLET    Take 1 tablet (10 mg) by mouth every 6 hours as needed (headache)     Scribe Disclosure:  I, Vick Mack, am serving as a scribe on 6/16/2020 at 8:38 AM to personally document services performed by Haylie Trejo MD based on my observations and the provider's statements to me.       Vick Mack  6/16/2020   [facility] EMERGENCY DEPARTMENT       Haylie Trejo MD  06/16/20 2825

## 2020-06-16 NOTE — ED TRIAGE NOTES
Hx of occipital neuralgia. Being followed by pain clinic. Trigger point injections and occipital block 2 weeks ago and it has not been helpful. Sensitivity to light and sound. Took 2 ibuprofen at 7:30 this am.

## 2020-06-16 NOTE — ED AVS SNAPSHOT
[facility] Emergency Department  [address]  [facility] [address]  Phone:  [phone]  Fax:  [phone]                                    Becky Lay   MRN: [id number]    Department:  [facility] Emergency Department   Date of Visit:  6/16/2020           After Visit Summary Signature Page    I have received my discharge instructions, and my questions have been answered. I have discussed any challenges I see with this plan with the nurse or doctor.    ..........................................................................................................................................  Patient/Patient Representative Signature      ..........................................................................................................................................  Patient Representative Print Name and Relationship to Patient    ..................................................               ................................................  Date                                   Time    ..........................................................................................................................................  Reviewed by Signature/Title    ...................................................              ..............................................  Date                                               Time          22EPIC Rev 08/18

## 2020-06-19 ENCOUNTER — VIRTUAL VISIT (OUTPATIENT)
Dept: PALLIATIVE MEDICINE | Facility: CLINIC | Age: 60
End: 2020-06-19
Payer: COMMERCIAL

## 2020-06-19 DIAGNOSIS — M47.812 FACET ARTHROPATHY, CERVICAL: Primary | ICD-10-CM

## 2020-06-19 DIAGNOSIS — M54.81 BILATERAL OCCIPITAL NEURALGIA: ICD-10-CM

## 2020-06-19 PROCEDURE — 99213 OFFICE O/P EST LOW 20 MIN: CPT | Mod: TEL | Performed by: NURSE PRACTITIONER

## 2020-06-19 ASSESSMENT — PAIN SCALES - GENERAL: PAINLEVEL: MILD PAIN (2)

## 2020-06-22 ENCOUNTER — TELEPHONE (OUTPATIENT)
Dept: PALLIATIVE MEDICINE | Facility: CLINIC | Age: 60
End: 2020-06-22

## 2020-06-22 NOTE — TELEPHONE ENCOUNTER
Pt will call back to schedule       Screening questions for MBB Injections:    Injection to be done at which interventional clinic site? [facility]     Instruct patient to arrive as directed prior to the scheduled appointment time:    [facility] minutes before      Procedure ordered by Dr. Nj    Procedure ordered? Cervical Medial Branch Block    What insurance would patient like us to bill for this procedure? [employer]      [employer]'s comp- Any injection DO NOT SCHEDULE and route to Alexa Saavedra.      [employer] insurance - If scheduling an SI joint injection DO NOT SCHEDULE and route to Alexa Saavedra.       MBBs must be scheduled with elapsed time interval of at least 2 weeks and not more than 6 months between the First MBB and the Second MBB for insurance purposes       [employer] - Any injection besides hip/shoulder/knee joint DO NOT SCHEDULE and route to Alexa Saavedra. She will obtain PA and call pt back to schedule procedure or notify pt of denial.       [employer]- PA required for all MBB's      **BCBS- ALL need to be routed to Alexa for review if a PA is needed**    Any chance of pregnancy? NO   If YES, do NOT schedule and route to RN pool    Is an  needed? No     Patient has a drive home? (mandatory) Yes     Is patient taking any blood thinners (plavix, coumadin, jantoven, warfarin, heparin, pradaxa or dabigatran )? No    If hold needed, do NOT schedule, route to RN pool     Is patient taking any aspirin products? No     If more than 325mg/day, OK to schedule; Instruct pt to decrease to less than 325 mg for 7 days AND route to RN pool    For CERVICAL procedures, hold all aspirin products for 6 days.     Tell pt that if aspirin product is not held for 6 days, the procedure WILL BE cancelled.      Does the patient have a bleeding or clotting disorder? No    If YES, okay to schedule AND route to RN nurse pool    **For any patients with platelet count <100, must be  forwarded to provider**    Is patient diabetic? NO If YES, have them bring their glucometer.    Does patient have an active infection or treated for one within the past week? No    Is patient currently taking any antibiotics?  No    For patients on chronic, preventative, or prophylactic antibiotics, procedures may be scheduled.     For patients on antibiotics for active or recent infection:antibiotic course must have been completed for 4 days    Is patient currently taking any steroid medications? (i.e. Prednisone, Medrol)  No     For patients on steroid medications, course must have been completed for 4 days    Is patient actively being treated for cancer or immunocompromised? No   If YES, do NOT schedule and route to RN    Are you able to get on and off an exam table with minimal or no assistance? Yes   If NO, do NOT schedule and route to RN    Are you able to roll over and lay on your stomach with minimal or no assistance? Yes   If NO, do NOT schedule and route to RN    Any allergies to contrast dye, iodine, shellfish, or numbing and steroid medications? No  (If so, inform nursing and note in scheduling comments.)    Allergies: No known allergies     Has the patient had a flu shot or any other vaccinations within 7 days before or after the procedure.  No     Does patient have an MRI/CT?  Not Applicable  Check Procedure Scheduling Grid to see if required.      Was the MRI done within the last 3 years?  NA    If yes, where was the MRI done i.e.[facility], [facility], [facility], [facility] etc?       If no, do not schedule and route to nursing    If MRI was not done at [facility], [facility] or [facility] do NOT schedule and route to nursing.      If pt has an imaging disc, the injection MAY be scheduled but pt has to bring disc to appt.     If they show up without the disc the injection cannot be done      Medial Branch Block Pre-Procedure Instructions    It is okay to take long acting pain medications (if you  are on them) the day of the procedure but try not to take any short acting medications unless absolutely necessary.    YES: ok   Long acting meds would include: Gabapentin (Neurontin), MS Contin, Oxycontin        Short acting meds would include:  Percocet, Oxycodone, Vicodin, Ibuprofen     The day of the procedure, you should try to do things that provoke your pain, since the injection is being done to see if it will relieve your pain . YES: ok     If your pain level is a 4 out of 10 or less on the day of the procedu re, please call [phone] to reschedule.  YES: ok     Reminders:      If you are started on any steroids or antibiotics between now and your appointment, you must contact us because it may affect our ability to perform your procedure ok      Instructed pt to arrive 30 minutes early for IV start if required. (Check Procedure Scheduling Grid) IYES: ok       If this is for a cervical MBB aspirin needs to be held for 6 days.  NO       Do not schedule procedures requiring IV placement in the first appointment of the day or first appointment after lunch. Do NOT schedule at 0745, 0815 or 1245.  ok      For patients 85 or older we recommend having an adult stay w/ them for the remainder of the day.      Does the patient have any questions? NO

## 2020-07-23 ENCOUNTER — TRANSFERRED RECORDS (OUTPATIENT)
Dept: HEALTH INFORMATION MANAGEMENT | Facility: CLINIC | Age: 60
End: 2020-07-23

## 2020-08-03 NOTE — TELEPHONE ENCOUNTER
Went through pre-screening questions, patient is scheduled        Alexa IRVIN    [facility]

## 2020-08-08 ENCOUNTER — OFFICE VISIT (OUTPATIENT)
Dept: URGENT CARE | Facility: URGENT CARE | Age: 60
End: 2020-08-08
Payer: COMMERCIAL

## 2020-08-08 VITALS
DIASTOLIC BLOOD PRESSURE: 70 MMHG | WEIGHT: 130 LBS | OXYGEN SATURATION: 100 % | TEMPERATURE: 98 F | BODY MASS INDEX: 22.31 KG/M2 | RESPIRATION RATE: 12 BRPM | HEART RATE: 76 BPM | SYSTOLIC BLOOD PRESSURE: 112 MMHG

## 2020-08-08 DIAGNOSIS — B37.0 ORAL THRUSH: Primary | ICD-10-CM

## 2020-08-08 PROCEDURE — 99213 OFFICE O/P EST LOW 20 MIN: CPT | Performed by: PHYSICIAN ASSISTANT

## 2020-08-08 RX ORDER — NYSTATIN 100000/ML
500000 SUSPENSION, ORAL (FINAL DOSE FORM) ORAL 4 TIMES DAILY
Qty: 200 ML | Refills: 1 | Status: SHIPPED | OUTPATIENT
Start: 2020-08-08 | End: 2020-08-18

## 2020-08-08 NOTE — NURSING NOTE
"Chief Complaint   Patient presents with     Urgent Care     Mouth/Lip Problem     Pretty sure that she hs oral thush.  She had it earleir this year and has the same symptoms.  She was at the dentist a few days ago and then developed it yesteday.     Initial /70 (BP Location: Right arm, Patient Position: Sitting, Cuff Size: Adult Regular)   Pulse 76   Temp 98  F (36.7  C) (Tympanic)   Resp 12   Wt 59 kg (130 lb)   LMP 06/20/2008   SpO2 100%   BMI 22.31 kg/m   Estimated body mass index is 22.31 kg/m  as calculated from the following:    Height as of 9/25/19: 1.626 m (5' 4\").    Weight as of this encounter: 59 kg (130 lb)..  BP completed using cuff size: regular  Macarena Allen R.N.    "

## 2020-08-08 NOTE — PROGRESS NOTES
SUBJECTIVE:  Becky Lay is a 60 year old female who comes in for oral thrush  Does have a hx and has the same sx.  Had recent dental visit and then seemed to develop  A few days after. White coating on tongue and irritation.  Does not use oral steroid inhalers.  Not diabetic.   Denies ST, cough or cold sx  Otherwise feels  well      Past Medical History:   Diagnosis Date     DJD C56,7      Current Outpatient Medications   Medication     acetaminophen (TYLENOL) 500 MG tablet     CITRACAL/VITAMIN D PO     cyclobenzaprine (FLEXERIL) 10 MG tablet     ibuprofen (ADVIL/MOTRIN) 200 MG tablet     prochlorperazine (COMPAZINE) 10 MG tablet     valACYclovir (VALTREX) 1000 mg tablet     No current facility-administered medications for this visit.      Social History     Socioeconomic History     Marital status:      Spouse name: Not on file     Number of children: Not on file     Years of education: Not on file     Highest education level: Not on file   Occupational History     Occupation: school nurse     Employer: [employer]   Social Needs     Financial resource strain: Not on file     Food insecurity     Worry: Not on file     Inability: Not on file     Transportation needs     Medical: Not on file     Non-medical: Not on file   Tobacco Use     Smoking status: Never Smoker     Smokeless tobacco: Never Used   Substance and Sexual Activity     Alcohol use: Not Currently     Alcohol/week: 10.0 standard drinks     Frequency: 2-4 times a month     Comment: one glass per week     Drug use: No     Sexual activity: Yes     Partners: Male     Birth control/protection: Surgical   Lifestyle     Physical activity     Days per week: Not on file     Minutes per session: Not on file     Stress: Not on file   Relationships     Social connections     Talks on phone: Not on file     Gets together: Not on file     Attends Zoroastrian service: Not on file     Active member of club or organization: Not on file      Attends meetings of clubs or organizations: Not on file     Relationship status: Not on file     Intimate partner violence     Fear of current or ex partner: Not on file     Emotionally abused: Not on file     Physically abused: Not on file     Forced sexual activity: Not on file   Other Topics Concern     Parent/sibling w/ CABG, MI or angioplasty before 65F 55M? Not Asked   Social History Narrative     Not on file     ROS  Negative other then stated above    Exam:  GENERAL APPEARANCE: healthy, alert and no distress  EYES: EOMI,  PERRL  HENT: oral mucous membranes moist and white patches on tongue and cheeks  Throat clear.  NECK: no adenopathy, no asymmetry, masses, or scars and thyroid normal to palpation  RESP: lungs clear to auscultation - no rales, rhonchi or wheezes  CV: regular rates and rhythm, normal S1 S2, no S3 or S4 and no murmur, click or rub -  SKIN: no suspicious lesions or rashes    assessment/plan:  (B37.0) Oral thrush  (primary encounter diagnosis)  Comment:   Plan: nystatin (MYCOSTATIN) 141073 UNIT/ML suspension          Medication as directed.  Follow-up with PCP as needed if sx worsen

## 2020-08-10 ENCOUNTER — RADIOLOGY INJECTION OFFICE VISIT (OUTPATIENT)
Dept: PALLIATIVE MEDICINE | Facility: CLINIC | Age: 60
End: 2020-08-10
Payer: COMMERCIAL

## 2020-08-10 ENCOUNTER — ANCILLARY PROCEDURE (OUTPATIENT)
Dept: GENERAL RADIOLOGY | Facility: CLINIC | Age: 60
End: 2020-08-10
Attending: PHYSICAL MEDICINE & REHABILITATION
Payer: COMMERCIAL

## 2020-08-10 ENCOUNTER — TELEPHONE (OUTPATIENT)
Dept: PALLIATIVE MEDICINE | Facility: CLINIC | Age: 60
End: 2020-08-10

## 2020-08-10 VITALS — SYSTOLIC BLOOD PRESSURE: 125 MMHG | DIASTOLIC BLOOD PRESSURE: 80 MMHG | OXYGEN SATURATION: 98 % | HEART RATE: 70 BPM

## 2020-08-10 DIAGNOSIS — M47.812 FACET ARTHROPATHY, CERVICAL: Primary | ICD-10-CM

## 2020-08-10 DIAGNOSIS — M47.812 FACET ARTHROPATHY, CERVICAL: ICD-10-CM

## 2020-08-10 PROCEDURE — 64490 INJ PARAVERT F JNT C/T 1 LEV: CPT | Mod: 50 | Performed by: PHYSICAL MEDICINE & REHABILITATION

## 2020-08-10 PROCEDURE — 64491 INJ PARAVERT F JNT C/T 2 LEV: CPT | Mod: 50 | Performed by: PHYSICAL MEDICINE & REHABILITATION

## 2020-08-10 NOTE — PROGRESS NOTES
[facility] - Procedure Note    Date of Service: 8/10/2020    Procedure performed: Bilateral TON, C3,4 medial branch blocks #1  Diagnosis: Cervical spondylosis; Cervical facet arthropathy  : Domi Gross MD  Complications: None    Indications: Becky Lay is a 60 year old female who is seen at the request of NANCY Mora CNP  for cervical medial branch blocks. The patient describes pain with neck extension/rotation. The patient reports minimal improvement with conservative treatment, including medications, PT and occipital nerve blocks.     MRI of cervical spine shows:      Allergies:      Allergies   Allergen Reactions     No Known Allergies         Vitals:  /80 (BP Location: Left arm, Cuff Size: Adult Regular)   Pulse 70   LMP 06/20/2008   SpO2 98%     Review of Systems: The patient denies recent fever, chills, illness, use of antibiotics or anticoagulants. All other 10-point review of systems negative.     Procedure:   Options/alternatives, benefits and risks were discussed with the patient including bleeding, infection, tissue trauma, exposure to radiation, reaction to medications, spinal cord injury, weakness, numbness and paralysis.  Questions were answered to her satisfaction and she agrees to proceed. Voluntary informed consent was obtained and signed.     After getting informed consent, patient was brought into the procedure suite and was placed in a side-lying position opposite the side of the procedure on the procedure table. A Pause for the Cause was performed. Patient was prepped and draped in sterile fashion.     Under AP fluoroscopic guidance the Right  TON, C3,4 articular pillars were identified. The C-arm was rotated to afford optimal visualization. Under intermittent fluoroscopy, a 22 gauge 1.5 inch needle was advanced slowly until it had contact on the mid portion of the articular pillar. Then, the two other needles of the same size  and gauge were placed under fluorsoscopic guidance using the same technique. The needle positions were verified and optimized from the AP and lateral views.    The anatomic targets for the TON, C3,4 medial nerves were the junction of C2-C3, C3 and C4 articular pillars, resulting in blockade of the C2-3 and C3-4 facet joints.    0.5 ml of Omnipaque-300 was used and 9.5 ml wasted. After negative aspiration, bupivacaine 0.25% 0.3 ml was injected at each location.  The needles were removed. Hemostasis was achieved, the area was cleaned, and bandaids were placed when appropriate. The patient tolerated the procedure well, and was taken to the recovery room. Images were saved to PACS.    Assessment/Plan: Becky Lay is a 60 year old female s/p bilateral TON, C3,4 medial branch blocks #1 today for cervical spondylosis, facet arthropathy.     Pre procecedure pain score: 5/10   Post procedure pain score: 1/10.   The patient will continue to monitor progress, and they were given a pain diary to complete at home.  They will either fax or mail this back to us or bring it to their next appointment. We will determine the treatment plan after we review the diary.      1. The patient was advised to contact the [facility] for any of the following:   Fever, chills, or night sweats   New onset of pain, numbness, or weakness   Any questions/concerns regarding the procedure  If unable to contact the Pain Center, the patient was instructed to go to a local Emergency Room for any complications.   2. The patient will receive a follow-up call in 1 week.  3. We will await the pain diary to determent next step of the treatment plan and call patient to schedule.    Domi Gross MD   [facility]

## 2020-08-10 NOTE — TELEPHONE ENCOUNTER
Called pt and relayed below. She states that she has been testing turning her head seems to be pretty good right now. She feels like her headache is starting to resolve.  She is not sure if the injection was initially helpful but will fill out pain log and return. She will make a follow up to discuss with primary pain provider,     Alyssa FLOWERS, RN Care Coordinator  [facility]  Pain Management

## 2020-08-10 NOTE — TELEPHONE ENCOUNTER
It sounds like she is having aggravation of symptoms post-procedure and this should improve with time. If the MBB did not provide improvement in her typical neck pain symptoms and headache she should schedule a follow up with NANCY Mora CNP to discuss other options.     Domi Gross MD  [facility]

## 2020-08-10 NOTE — TELEPHONE ENCOUNTER
"Recvd call from patient. She states that she has a headache and heavy feeling and numbness to back of head. She is also nauseated.     She states that she has headaches that stem from her neck which feels like migraine. States that it feels better when she lays down but is not profoundly different. States it always feels good to lay down when she has a headache. States that she is having numbness in the back of her head/scalp, states it is feeling \"heavy\". Denied any numbness or tingling to arms or new pains. Site was observed to be WNL. She was not sure f she was reacting to contrast-No hives or shortness of breath or tingling in mouth. Took tylenol (took 2 extra strength) at 11am, no benefit. Advised that will route to review and nursing to contact her back.      Alyssa FLOWERS, RN Care Coordinator  [facility]  Pain Management    "

## 2020-08-10 NOTE — NURSING NOTE
Discharge Information    IV Discontiued Time:  NA    Amount of Fluid Infused:  NA    Discharge Criteria = When patient returns to baseline or as per MD order    Consciousness:  Pt is fully awake    Circulation:  BP +/- 20% of pre-procedure level    Respiration:  Patient is able to breathe deeply    O2 Sat:  Patient is able to maintain O2 Sat >92% on room air    Activity:  Moves 4 extremities on command    Ambulation:  Patient is able to stand and walk or stand and pivot into wheelchair    Dressing:  Clean/dry or No Dressing    Notes:   Discharge instructions and AVS given to patient    Patient meets criteria for discharge?  YES    Admitted to PCU?  No    Responsible adult present to accompany patient home?  Yes    Signature/Title:    Ursula Marquez RN Care Coordinator  [facility] [place]

## 2020-08-10 NOTE — PATIENT INSTRUCTIONS
[facility] Cervical Medial Branch Block # Discharge Instructions      Your procedure was performed by:   Dr. Domi Gross    Medications used: lidocaine, bupivicaine omnipaque    You will need to complete the Pain Scale Log form and return it to us as soon as possible.  Once we have received the form, we will review it and call you to determine the next steps.     The form can be faxed to [phone] or mailed to:   [facility]    [address] [place] [address], [address] [id number]      You may resume your regular activity after the injection.    Avoid driving for 6 hours. The local anesthetic could slow your reflexes    You may shower, however no swimming or tub baths or hot tubs for 24 hours following your procedure.    Your pain will return after the numbing medications have worn off.  You may use your current pain medications as needed.    Unless you have been directed to avoid the use of anti-inflammatory medications (NSAIDS), you may use medications such as ibuprofen, Aleve or Tylenol for pain control if needed. Some people find it helpful to alternate ibuprofen and Tylenol every 3 hours for a couple of days.    You may use ice packs 10-15 minutes three to four times a day at the injection site for comfort.     Do not use heat to painful areas for 6 to 8 hours. This will give the local anesthetic time to wear off and prevent you from accidentally burning your skin.     If you experience any of the following, call the Pain Clinic during work hours (Monday through Friday 8 am-4:30 pm) at [phone] or the Provider Line after hours at [phone]:  -Fever over 100 degree F  -Swelling, bleeding, redness, drainage, warmth at the injection site  -Progressive weakness or numbness in your  arms  -If cervical, call if you have any unusual headache that is not relieved by Tylenol  -Unusual new onset of pain that is not  improving

## 2020-08-11 NOTE — PROGRESS NOTES
"Becky Lay is a 60 year old female who is being evaluated via a billable telephone visit.      The patient has been notified of following:     \"This telephone visit will be conducted via a call between you and your physician/provider. We have found that certain health care needs can be provided without the need for a physical exam.  This service lets us provide the care you need with a short phone conversation.  If a prescription is necessary we can send it directly to your pharmacy.  If lab work is needed we can place an order for that and you can then stop by our lab to have the test done at a later time.    Telephone visits are billed at different rates depending on your insurance coverage. During this emergency period, for some insurers they may be billed the same as an in-person visit.  Please reach out to your insurance provider with any questions.    If during the course of the call the physician/provider feels a telephone visit is not appropriate, you will not be charged for this service.\"    Patient has given verbal consent for Telephone visit?  Yes    What phone number would you like to be contacted at? [phone]    How would you like to obtain your AVS? Niurka Gomez [facility]  [facility]        Recommendations at last vist on 6/19/2020:  1.  Pain Physical Therapy:                Completed. Continue to practice gentle stretches and exercises with extra self care.               2.  Pain Psychologist to address relaxation, behavioral change, coping style, and other factors important to improvement.                     Completed.              3.  Medication Management:                           1. Okay to continue increased use of ibuprofen and Flexeril for now until able to repeat injections.               4.  Potential procedures: we discussed injections at length today. I am suspicious that occipital neuralgia is only a component of her pain origin " and that instead, her headaches and neck pain are cervicogenic. She has a C5-6 fusion and facet arthropathy noted at C6-7 and C7-T1 as well as at C4-5. We discussed the cervical medial branch block to RFA process and facet joint injections. I think that the pain may be originating in lower facet levels. I did discuss this patient and her pain with Dr. Cardona, he informed me that the exact injection may need to be decided on the day of the procedure whether facet joint injection or medial branch block to RFA process would be most beneficial. TBD order placed. Patient understands she will either have treatment with facet joint injections or a medial branch block with plan to progress in process.               5.  Follow up with NANCY Henderson CNP in 8 weeks, okay to push out further if doing well.      Additional provider notes:  -Her pain is about the same as it was at last visit.  -Her headaches continue to be her most bothersome symptom.   -She reports since she first starting seeing the pain clinic, her neck pain has improved some. She notes less tightness and attributes this to be due to trigger point injections.   -She had bilateral TON, C3,4 medial branch blocks #1 with Dr. Gross on 8/10/2020. She reports her headache was worse after the injection.   -Of all of the injections she has had thus far, she reports her bilateral occipital nerve blocks (without steroid) in November were the most helpful. Notes these injections eliminated her pain for about 8-8 weeks. Her occipital nerve blocks in June were not as helpful (also without steroid), not significant immediately but thinks they were in the following days and weeks. Also an ED visit a couple days later so this may have contributed.   -Of note, she comments she did not want to have steroid in with occipital nerve blocks as she was fearful of hair loss.   -She also wonders about how much her tension headaches are contributing to her pain.     Current pain  "medications:               Flexeril 10mg prn- [facility] now takes prn at bedtime as somewhat more effective                Ibuprofen 400mg BID or TID- [facility], not taking regularly              Tylenol 1000mg prn - ?, not taking currently     Past pain treatments:  1. Previous Pain Relevant Medications:  NOTE: This medication information taken from patient's intake form, not medical records.               Opiates:  Vicodin- [facility], nausea/vomiting, \"no I don't want it\"               NSAIDS: ibuprofen- [facility], naproxen- [facility]              Muscle Relaxants: Flexeril- [facility] usually              Anti-migraine mediations: no              Anti-depressants: no              Sleep aids: no              Anxiolytics: no              Neuropathics: no                       Topicals: no              Other medications not covered above: Tylenol- ?, Prednisone- [facility]     2. Physical Therapy: yes prior to surgery and after- [facility], practices occasionally, pain physical therapy- [facility]  3[facility]  4. Surgery:  C5-6 discectomy and fusion with Dr. Jose at [facility] in March of 2003  5. Injections: bilateral TON, C3,4 medial branch blocks #1 with Dr. Gross on 8/10/2020- [facility], [facility] for a few days  bilateral occipital nerve blocks and trigger point injections with me on 6/1/2020- [facility] for about 8 weeks, less so than previous occipital nerve blocks   trigger point injections with me on 1/19/2020-  for 2-3 months  bilateral occipital nerve block with Dr. Gross on 11/1/19- [facility]!!!! 8-9 weeks  trigger point injections with me on 9/23/19- [facility] for neck pain, continue to have some benefit  trigger point injections with me 8/23/19 - [facility] for 2 weeks neck pain and headaches, 2.5 weeks tops  6. Chiropractic: no  7. Acupuncture: no  8. TENS Unit: no    Imaging:  MRI of cervical spine was completed on 10/29/19 and shows:              Assessment:   Becky Lay is a 60 year old female with a past medical history significant " for hyperhidrosis who presents with complaints of neck pain.      1. Neck pain- etiology likely combination of facet arthropathy and occipital neuralgia, s/p C5-6 discectomy and fusion with Dr. Jose at [facility] in March of 2003  2. Mental Health - the patient's mental health concerns, specifically situational depression, affect her experience of pain and contribute to her clinically significant distress.        1. Bilateral occipital neuralgia    2. Tension headache        Plan:     1.  Pain Physical Therapy:                Completed. Encouraged Becky [place] continue to practice gentle stretches and exercises with extra self care.               2.  Pain Psychologist to address relaxation, behavioral change, coping style, and other factors important to improvement.                     Completed.              3.  Medication Management:                           1. Okay to continue increased use of ibuprofen and Flexeril as needed.    2. If repeat injection is not helpful, we will likely discuss starting a headache preventative medication. May consider starting with gabapentin or amitriptyline.               4.  Potential procedures: of all of the injections Becky [place] has had thus far, occipital nerve blocks in November were most helpful. We are going to repeat occipital nerve blocks but with the addition of a steroid.              5.  Follow up with NANCY Henderson CNP 4 weeks after injection.     Phone call duration: 24 minutes    NANCY Singletary CNP

## 2020-08-13 ENCOUNTER — VIRTUAL VISIT (OUTPATIENT)
Dept: PALLIATIVE MEDICINE | Facility: CLINIC | Age: 60
End: 2020-08-13
Payer: COMMERCIAL

## 2020-08-13 DIAGNOSIS — M54.81 BILATERAL OCCIPITAL NEURALGIA: Primary | ICD-10-CM

## 2020-08-13 DIAGNOSIS — G44.209 TENSION HEADACHE: ICD-10-CM

## 2020-08-13 PROCEDURE — 99213 OFFICE O/P EST LOW 20 MIN: CPT | Mod: TEL | Performed by: NURSE PRACTITIONER

## 2020-08-13 ASSESSMENT — PAIN SCALES - GENERAL: PAINLEVEL: MODERATE PAIN (5)

## 2020-08-13 NOTE — PATIENT INSTRUCTIONS
1.  Pain Physical Therapy:                Completed. Continue to practice gentle stretches and exercises with extra self care.               2.  Pain Psychologist to address relaxation, behavioral change, coping style, and other factors important to improvement.                     Completed.              3.  Medication Management:                           1. Okay to continue increased use of ibuprofen and Flexeril as needed.              4.  Potential procedures: we are going to repeat occipital nerve blocks with the addition of a steroid. If these are not helpful, we will likely discuss starting a headache preventative medication.              5.  Follow up with NANCY Henderson CNP 4 weeks after injection.

## 2020-08-24 NOTE — PROGRESS NOTES
Pre procedure Diagnosis: occipital neuralgia   Post procedure Diagnosis: Same  Procedure performed: bilateral occipital nerve block  Anesthesia: none  Complications: none  Operators: Jacquelyn CRUZ CNP     Indications:   Becky Lay is a 60 year old female with a history of bilateral occipital neuralgia.  They have tried conservative treatment including PT and medications.    Options/alternatives, benefits and risks were discussed with the patient including bleeding, infection, hematoma, nerve damage, and stroke, Questions were answered to her satisfaction and she agrees to proceed. Voluntary informed consent was obtained and signed.     Vitals were reviewed: Yes  Allergies were reviewed:  Yes   Medications were reviewed:  Yes   Pre-procedure pain score: 4/10    /75   Pulse 95   LMP 06/20/2008   SpO2 97%     She continues to note substantial improvement in neck pain from trigger point injections. This is a repeat bilateral occipital nerve block, on 11/1/19 (no steroid) which was very helpful for 8 weeks and on 6/1/2020 (no steroid) which was somewhat helpful for a shorter period of time.    Procedure:  After getting informed consent, a Pause for the Cause was performed.    The occipital ridge, occipital protuberance, and mastoid process were palpated on bilateral sides.  The location of maximal tenderness which was consistent with the location of the occipital nerves was palpated. The area was cleaned.    Palpation for a pulse was completed, with no pulse noted at the site of the injection.  A 25G, 1.5 inch needle was introduced, aimed cephalad, in the superficial tissues at this area of tenderness.  The injection was completed at this location, fanning in 3 different directions.  Aspiration for heme was negative before all injections.    In total, 2.5ml of 0.5% bupivacaine, 2.5ml of 1% lidocaine, and 40mg Kenolog was injected.     The patient tolerated the procedure well, hemostasis was  achieved.      Post-procedure pain score: 2/10  Follow-up includes:   -f/u with me as planned  -schedule malina SIMPSON [facility]

## 2020-08-25 ENCOUNTER — OFFICE VISIT (OUTPATIENT)
Dept: PALLIATIVE MEDICINE | Facility: CLINIC | Age: 60
End: 2020-08-25
Attending: NURSE PRACTITIONER
Payer: COMMERCIAL

## 2020-08-25 VITALS — SYSTOLIC BLOOD PRESSURE: 115 MMHG | OXYGEN SATURATION: 97 % | HEART RATE: 95 BPM | DIASTOLIC BLOOD PRESSURE: 75 MMHG

## 2020-08-25 DIAGNOSIS — M54.81 BILATERAL OCCIPITAL NEURALGIA: Primary | ICD-10-CM

## 2020-08-25 PROCEDURE — 64405 NJX AA&/STRD GR OCPL NRV: CPT | Mod: 50 | Performed by: NURSE PRACTITIONER

## 2020-08-25 RX ORDER — BUPIVACAINE HYDROCHLORIDE 5 MG/ML
2.5 INJECTION, SOLUTION EPIDURAL; INTRACAUDAL ONCE
Status: COMPLETED | OUTPATIENT
Start: 2020-08-25 | End: 2020-08-25

## 2020-08-25 RX ORDER — TRIAMCINOLONE ACETONIDE 40 MG/ML
40 INJECTION, SUSPENSION INTRA-ARTICULAR; INTRAMUSCULAR ONCE
Status: COMPLETED | OUTPATIENT
Start: 2020-08-25 | End: 2020-08-25

## 2020-08-25 RX ADMIN — TRIAMCINOLONE ACETONIDE 40 MG: 40 INJECTION, SUSPENSION INTRA-ARTICULAR; INTRAMUSCULAR at 09:45

## 2020-08-25 RX ADMIN — BUPIVACAINE HYDROCHLORIDE 12.5 MG: 5 INJECTION, SOLUTION EPIDURAL; INTRACAUDAL at 09:45

## 2020-08-25 ASSESSMENT — PAIN SCALES - GENERAL: PAINLEVEL: MODERATE PAIN (4)

## 2020-08-25 NOTE — PATIENT INSTRUCTIONS
[facility]   Post Procedure Instructions    Today you had:     occipital nerve block       Medications used:  lidocaine   bupivicaine   kenolog           Go to the emergency room if you develop any shortness of breath    Monitor the injection sites for signs and symptoms of infection-fever, chills, redness, swelling, warmth, or drainage to areas.    You may have soreness at injection sites for up to 24 hours.    You may apply ice to the painful areas to help minimize the discomfort of the needle pokes.    Do not apply heat to sites for at least 12 hours.    You may use anti-inflammatory medications or Tylenol for pain control if necessary    Pain Clinic phone number during work hours Monday-Friday:  [phone]    After hours provider line: [phone]

## 2020-08-27 ENCOUNTER — TRANSFERRED RECORDS (OUTPATIENT)
Dept: HEALTH INFORMATION MANAGEMENT | Facility: CLINIC | Age: 60
End: 2020-08-27

## 2020-09-04 ENCOUNTER — APPOINTMENT (OUTPATIENT)
Dept: LAB | Facility: CLINIC | Age: 60
End: 2020-09-04
Payer: COMMERCIAL

## 2020-09-09 ENCOUNTER — HOSPITAL ENCOUNTER (EMERGENCY)
Facility: CLINIC | Age: 60
Discharge: HOME | End: 2020-09-09
Attending: EMERGENCY MEDICINE | Admitting: EMERGENCY MEDICINE
Payer: COMMERCIAL

## 2020-09-09 VITALS
HEART RATE: 68 BPM | RESPIRATION RATE: 16 BRPM | OXYGEN SATURATION: 100 % | TEMPERATURE: 98.9 F | SYSTOLIC BLOOD PRESSURE: 128 MMHG | DIASTOLIC BLOOD PRESSURE: 87 MMHG

## 2020-09-09 DIAGNOSIS — L65.9 HAIR LOSS: ICD-10-CM

## 2020-09-09 DIAGNOSIS — G89.29 CHRONIC NONINTRACTABLE HEADACHE, UNSPECIFIED HEADACHE TYPE: ICD-10-CM

## 2020-09-09 DIAGNOSIS — R51.9 CHRONIC NONINTRACTABLE HEADACHE, UNSPECIFIED HEADACHE TYPE: ICD-10-CM

## 2020-09-09 DIAGNOSIS — B37.0 THRUSH: ICD-10-CM

## 2020-09-09 DIAGNOSIS — R53.1 WEAKNESS: ICD-10-CM

## 2020-09-09 LAB
ANION GAP SERPL CALCULATED.3IONS-SCNC: 5 MMOL/L (ref 3–14)
BASOPHILS # BLD AUTO: 0 10E9/L (ref 0–0.2)
BASOPHILS NFR BLD AUTO: 0.5 %
BUN SERPL-MCNC: 12 MG/DL (ref 7–30)
CALCIUM SERPL-MCNC: 8.7 MG/DL (ref 8.5–10.1)
CHLORIDE SERPL-SCNC: 100 MMOL/L (ref 94–109)
CO2 SERPL-SCNC: 28 MMOL/L (ref 20–32)
CREAT SERPL-MCNC: 0.77 MG/DL (ref 0.52–1.04)
DIFFERENTIAL METHOD BLD: NORMAL
EOSINOPHIL # BLD AUTO: 0 10E9/L (ref 0–0.7)
EOSINOPHIL NFR BLD AUTO: 0.5 %
ERYTHROCYTE [DISTWIDTH] IN BLOOD BY AUTOMATED COUNT: 12.7 % (ref 10–15)
GFR SERPL CREATININE-BSD FRML MDRD: 83 ML/MIN/{1.73_M2}
GLUCOSE SERPL-MCNC: 109 MG/DL (ref 70–99)
HCT VFR BLD AUTO: 38.7 % (ref 35–47)
HGB BLD-MCNC: 12.5 G/DL (ref 11.7–15.7)
IMM GRANULOCYTES # BLD: 0 10E9/L (ref 0–0.4)
IMM GRANULOCYTES NFR BLD: 0.3 %
LYMPHOCYTES # BLD AUTO: 1.4 10E9/L (ref 0.8–5.3)
LYMPHOCYTES NFR BLD AUTO: 18.5 %
MCH RBC QN AUTO: 28.1 PG (ref 26.5–33)
MCHC RBC AUTO-ENTMCNC: 32.3 G/DL (ref 31.5–36.5)
MCV RBC AUTO: 87 FL (ref 78–100)
MONOCYTES # BLD AUTO: 0.5 10E9/L (ref 0–1.3)
MONOCYTES NFR BLD AUTO: 6.7 %
NEUTROPHILS # BLD AUTO: 5.6 10E9/L (ref 1.6–8.3)
NEUTROPHILS NFR BLD AUTO: 73.5 %
NRBC # BLD AUTO: 0 10*3/UL
NRBC BLD AUTO-RTO: 0 /100
PLATELET # BLD AUTO: 260 10E9/L (ref 150–450)
POTASSIUM SERPL-SCNC: 3.8 MMOL/L (ref 3.4–5.3)
RBC # BLD AUTO: 4.45 10E12/L (ref 3.8–5.2)
SODIUM SERPL-SCNC: 133 MMOL/L (ref 133–144)
T4 FREE SERPL-MCNC: 1.05 NG/DL (ref 0.76–1.46)
TSH SERPL DL<=0.005 MIU/L-ACNC: 4.76 MU/L (ref 0.4–4)
WBC # BLD AUTO: 7.6 10E9/L (ref 4–11)

## 2020-09-09 PROCEDURE — 96374 THER/PROPH/DIAG INJ IV PUSH: CPT

## 2020-09-09 PROCEDURE — 25000128 H RX IP 250 OP 636: Performed by: EMERGENCY MEDICINE

## 2020-09-09 PROCEDURE — 99284 EMERGENCY DEPT VISIT MOD MDM: CPT | Mod: 25

## 2020-09-09 PROCEDURE — 96375 TX/PRO/DX INJ NEW DRUG ADDON: CPT

## 2020-09-09 PROCEDURE — 25800030 ZZH RX IP 258 OP 636: Performed by: EMERGENCY MEDICINE

## 2020-09-09 PROCEDURE — 84439 ASSAY OF FREE THYROXINE: CPT | Performed by: EMERGENCY MEDICINE

## 2020-09-09 PROCEDURE — 84443 ASSAY THYROID STIM HORMONE: CPT | Performed by: EMERGENCY MEDICINE

## 2020-09-09 PROCEDURE — 80048 BASIC METABOLIC PNL TOTAL CA: CPT | Performed by: EMERGENCY MEDICINE

## 2020-09-09 PROCEDURE — 85025 COMPLETE CBC W/AUTO DIFF WBC: CPT | Performed by: EMERGENCY MEDICINE

## 2020-09-09 PROCEDURE — 96361 HYDRATE IV INFUSION ADD-ON: CPT

## 2020-09-09 RX ORDER — DIPHENHYDRAMINE HYDROCHLORIDE 50 MG/ML
50 INJECTION INTRAMUSCULAR; INTRAVENOUS ONCE
Status: COMPLETED | OUTPATIENT
Start: 2020-09-09 | End: 2020-09-09

## 2020-09-09 RX ORDER — DEXAMETHASONE SODIUM PHOSPHATE 10 MG/ML
10 INJECTION, SOLUTION INTRAMUSCULAR; INTRAVENOUS ONCE
Status: COMPLETED | OUTPATIENT
Start: 2020-09-09 | End: 2020-09-09

## 2020-09-09 RX ORDER — KETOROLAC TROMETHAMINE 15 MG/ML
15 INJECTION, SOLUTION INTRAMUSCULAR; INTRAVENOUS ONCE
Status: COMPLETED | OUTPATIENT
Start: 2020-09-09 | End: 2020-09-09

## 2020-09-09 RX ADMIN — DIPHENHYDRAMINE HYDROCHLORIDE 50 MG: 50 INJECTION, SOLUTION INTRAMUSCULAR; INTRAVENOUS at 08:21

## 2020-09-09 RX ADMIN — DEXAMETHASONE SODIUM PHOSPHATE 10 MG: 10 INJECTION, SOLUTION INTRAMUSCULAR; INTRAVENOUS at 08:23

## 2020-09-09 RX ADMIN — PROCHLORPERAZINE EDISYLATE 10 MG: 5 INJECTION INTRAMUSCULAR; INTRAVENOUS at 08:27

## 2020-09-09 RX ADMIN — KETOROLAC TROMETHAMINE 15 MG: 15 INJECTION, SOLUTION INTRAMUSCULAR; INTRAVENOUS at 08:25

## 2020-09-09 RX ADMIN — SODIUM CHLORIDE 500 ML: 9 INJECTION, SOLUTION INTRAVENOUS at 08:20

## 2020-09-09 ASSESSMENT — ENCOUNTER SYMPTOMS
HEADACHES: 1
FEVER: 0
NAUSEA: 1
FREQUENCY: 0
SHORTNESS OF BREATH: 0
SORE THROAT: 1
ABDOMINAL PAIN: 0

## 2020-09-09 NOTE — ED PROVIDER NOTES
History   Chief Complaint  Sore throat and thrush     The history is provided by the patient.      Becky Lay is a 60 year old female with a history of cervical DJD who presents for evaluation of weakness, fatigue, and thrush for about a month. The patient was seen at Urgent care on 08/08 where she was diagnosed with thrush. However, this did not improve and she was subsequently seen by ENT. They prescribed Diflucan, but has not noticed any improvement of her symptoms with this. She has a few days left of treatment. She states ENT recommended doing a biopsy if it not improving. She had an episode of thrush in January as well. Other symptoms include nausea, a burning sensation in her mouth, and some hair thinning. She has a headache as well, but states these are chronic for her. She recently had an occipital block, but did not notice much improvement in her symptoms. She denies chest pain, shortness of breath, abdominal pain, urinary frequency, fevers, and leg swelling. There is no history of cancer or thyroid disease and is not immunocompromised. She had labs drawn by a [facility] clinic 5 days ago.      Allergies  No Known Allergies    Medications  The patient is not currently on any daily prescription medications.    Past Medical History  DJD C5,6,7  Cervicalgia  Generalized hyperhidrosis     Past Surgical History  Laminectomy w/o facet cervical, anterior C5-6 fusion     Family History  Mother - osteoporosis, lipids, dementia  Father - MI, heart disease    Social History  The patient was unaccompanied to the ED.  Smoking Status: never  Smokeless Tobacco: never  Alcohol Use: not currently  Drug Use: no    Review of Systems   Constitutional: Negative for fever.   HENT: Positive for sore throat.    Respiratory: Negative for shortness of breath.    Cardiovascular: Negative for chest pain and leg swelling.   Gastrointestinal: Positive for nausea. Negative for abdominal pain.   Genitourinary: Negative for  frequency.   Neurological: Positive for headaches.   All other systems reviewed and are negative.    Physical Exam     Patient Vitals for the past 24 hrs:   BP Temp Pulse Resp SpO2   09/09/20 0723 (!) 158/88 98.9  F (37.2  C) 81 18 100 %       Physical Exam  General: Alert, appears well-developed and well-nourished. Cooperative.     In mild distress  HEENT:  Head:  Atraumatic  Ears:  External ears are normal  Mouth/Throat:  Oropharynx is without erythema or exudate and mucous membranes are moist.  The tongue is covered in white layer, consistent with thrush.     Eyes:   Conjunctivae normal and EOM are normal. No scleral icterus.    Pupils are equal, round, and reactive to light.   Neck:   Normal range of motion. Neck supple.  CV:  Normal rate, regular rhythm, normal heart sounds and radial pulses are 2+ and symmetric.  No murmur.  Resp:  Breath sounds are clear bilaterally    Non-labored, no retractions or accessory muscle use  GI:  Abdomen is soft, no distension, no tenderness. No rebound or guarding.  No CVA tenderness bilaterally  MS:  Normal range of motion. No edema.    Normal strength in all 4 extremities.     Back atraumatic.    No midline cervical, thoracic, or lumbar tenderness  Skin:  Warm and dry.  No rash or lesions noted.  Neuro: Alert. Normal strength.  GCS: 15  Psych:  Normal mood and affect.    Emergency Department Course   Laboratory:  Laboratory findings were communicated with the patient who voiced understanding of the findings.    CBC: AWNL (WBC 7.6, HGB 12.5, )  BMP: glucose 109 (H) o/w WNL (Creatinine 0.77)  TSH with free T4 reflex: 4.76 (H)  T4 free: 1.05    Interventions:  0820 NS 1L IV Bolus  0821 Benadryl 50 mg IV  0823 Decadron 10 mg IV  0825 Toradol 15 mg IV  0827 Compazine 10 mg IV     Emergency Department Course:  Past medical records, nursing notes, and vitals reviewed.    0736 I physically examined the patient as documented above.    IV was inserted and blood was drawn for  laboratory testing, results above.    0850 I rechecked the patient and discussed the findings of their workup thus far.     Findings and plan explained to the Patient. Patient discharged home with instructions regarding supportive care, medications, and reasons to return. The importance of close follow-up was reviewed.     I personally reviewed the laboratory results with the Patient and answered all related questions prior to discharge.     Impression & Plan   Medical Decision Making:  Patient is a 60-year-old female who presents with multiple complaints including fatigue, headaches, thrush, and overall not feeling well.  Thankfully she is not having any chest pain, shortness of breath, back pain, urinary complaints, fever or other constitutional symptoms.  She does appear to have thrush on her tongue although is being followed with otolaryngology closely regarding this.  She has already been on a course of Diflucan and has several days left of this.  Next steps may include a potential biopsy according to her report of a conversation with her otolaryngologist.  At this time I would not switch the current treatment regimen for suspected thrush of the oropharynx.  In terms of her headaches, she does have chronic headaches that are well documented in her chart.  This is not a more severe headache than normal for her.  She initially did not want any treatment for her headache, but after 30 minutes in the emergency department decided she would like to trial a course of headache medications.  She was ultimately given a cocktail including Toradol, Compazine, Benadryl, and Decadron.  She felt improved after this.  No indication for advanced CT or MRI imaging of the head today particularly with the well-documented headache history in the past and consistent symptoms with prior HA episodes.  She is followed closely with the interventional pain clinic.  Blood work grossly unremarkable with normal thyroid function, kidney  function, CBC, and electrolytes.  Unclear to the exact etiology of the patient's multiple complaints today but close outpatient follow-up with her primary care provider, otolaryngologist, and interventional pain clinic was recommended.  After all questions answered and return precautions understood, discharged home.    Diagnosis:    ICD-10-CM    1. Thrush  B37.0 CBC with platelets differential     Basic metabolic panel     TSH with free T4 reflex     T4 free     T4 free   2. Weakness  R53.1    3. Hair loss  L65.9    4. Chronic nonintractable headache, unspecified headache type  R51      Disposition:  Discharged to home.    Scribe Disclosure:  I, Nadja Wong, am serving as a scribe at 7:30 AM on 9/9/2020 to document services personally performed by Josep Faustin MD based on my observations and the provider's statements to me.      Josep Faustin MD  09/09/20 [id number]

## 2020-09-09 NOTE — ED NOTES
Patient given written and verbal discharge instructions, answered all questions.  Ambulatory out department independently.

## 2020-09-09 NOTE — ED TRIAGE NOTES
"Patient presents with complaints of headaches, thrush and \"not feeling well\". ABC intact without need for intervention at this time.     "

## 2020-09-09 NOTE — ED AVS SNAPSHOT
[facility] Department  [address]  [facility] [address]  Phone:  [phone]  Fax:  [phone]                                    Becky Lay   MRN: [id number]    Department:  [facility] Emergency Department   Date of Visit:  9/9/2020           After Visit Summary Signature Page    I have received my discharge instructions, and my questions have been answered. I have discussed any challenges I see with this plan with the nurse or doctor.    ..........................................................................................................................................  Patient/Patient Representative Signature      ..........................................................................................................................................  Patient Representative Print Name and Relationship to Patient    ..................................................               ................................................  Date                                   Time    ..........................................................................................................................................  Reviewed by Signature/Title    ...................................................              ..............................................  Date                                               Time          22EPIC Rev 08/18

## 2020-09-14 ENCOUNTER — TELEPHONE (OUTPATIENT)
Dept: PEDIATRICS | Facility: CLINIC | Age: 60
End: 2020-09-14

## 2020-09-14 NOTE — TELEPHONE ENCOUNTER
Huddled with Dr. Larkin-he recommends seeing GYN for possible endometrial biopsy, patient is willing to call to schedule with them, given phone# to schedule her own appointment, appt for 10/19 with AMS cancelled, patient agrees with cancellation, she will call back as needed.

## 2020-09-14 NOTE — TELEPHONE ENCOUNTER
Reason for call:  Other   Patient called regarding (reason for call): appointment  Additional comments: sooner appt for light post menopausal bleeding    Phone number to reach patient:  Cell number on file:    Telephone Information:   Mobile [phone]       Best Time:  any    Can we leave a detailed message on this number?  YES    Travel screening: Not Applicable

## 2020-09-18 NOTE — PROGRESS NOTES
"Becky Lay is a 60 year old female who is being evaluated via a billable telephone visit.      The patient has been notified of following:     \"This telephone visit will be conducted via a call between you and your physician/provider. We have found that certain health care needs can be provided without the need for a physical exam.  This service lets us provide the care you need with a short phone conversation.  If a prescription is necessary we can send it directly to your pharmacy.  If lab work is needed we can place an order for that and you can then stop by our lab to have the test done at a later time.    Telephone visits are billed at different rates depending on your insurance coverage. During this emergency period, for some insurers they may be billed the same as an in-person visit.  Please reach out to your insurance provider with any questions.    If during the course of the call the physician/provider feels a telephone visit is not appropriate, you will not be charged for this service.\"    Patient has given verbal consent for Telephone visit?  Yes    What phone number would you like to be contacted at? [phone]    How would you like to obtain your AVS? Niurka Gomez [facility]  [place]      Recommendations at last vist on 8/13/2020:              1.  Pain Physical Therapy:                Completed. Encouraged Becky Montenegro continue to practice gentle stretches and exercises with extra self care.               2.  Pain Psychologist to address relaxation, behavioral change, coping style, and other factors important to improvement.                     Completed.              3.  Medication Management:                           1. Okay to continue increased use of ibuprofen and Flexeril as needed.                          2. If repeat injection is not helpful, we will likely discuss starting a headache preventative medication. May consider starting with " "gabapentin or amitriptyline.               4.  Potential procedures: of all of the injections Becky [place]en[place]ro has had thus far, occipital nerve blocks in November were most helpful. We are going to repeat occipital nerve blocks but with the addition of a steroid.              5.  Follow up with NANCY Henderson CNP 4 weeks after injection.     Additional provider notes:  -Her pain is about the same as it was at last visit.   -She does note some overall improvement since starting working with the pain clinic, attributes this to be due to trigger point injections. Her last trigger point injections were done on 6/1/2020, still thinks she is having benefit from this.   -She had repeat occipital nerve blocks (with steroid) with me on 8/25/2020. Denies pain benefit in headaches.  -She was seen in the ED on 9/9/2020 for thrush, weakness, hair loss, and headaches, states, \"I didn't feel good.\" She was given a migraine cocktail but really didn't even feel much better afterwards as she usually does.   -She paused taking Flexeril because her neck tightness seems to be manageable. She has been taking Aleve and Tylenol or ibuprofen and Tylenol most mornings with benefit. The mornings are her most painful time.   -She continues to practice stretches and exercises throughout the day.   -She is open to starting a headache preventative medication.     Current pain medications:               Flexeril 10mg prn- [facility] now takes prn at bedtime as somewhat more effective                Ibuprofen 400mg BID or TID- [facility], most mornings, alternates with Aleve              Tylenol 650mg prn - [facility] will take with Aleve    Past pain treatments:  1. Previous Pain Relevant Medications:  NOTE: This medication information taken from patient's intake form, not medical records.               Opiates:  Vicodin- [facility], nausea/vomiting, \"no I don't want it\"               NSAIDS: ibuprofen- [facility], naproxen- [facility]              Muscle Relaxants: Flexeril- [facility] " usually              Anti-migraine mediations: no              Anti-depressants: no              Sleep aids: no              Anxiolytics: no              Neuropathics: no                       Topicals: no              Other medications not covered above: Tylenol- ?, Prednisone- H     2. Physical Therapy: yes prior to surgery and after- [facility], practices occasionally, pain physical therapy- [facility]  [facility]  4. Surgery:  C5-6 discectomy and fusion with Dr. Jose at [facility] in March of 2003  5. Injections: repeat bilateral occipital nerve blocks (with steroid) with me on 8/25/2020- [facility]  bilateral TON, C3,4 medial branch blocks #1 with Dr. Gross on 8/10/2020- [facility], [facility] for a few days  bilateral occipital nerve blocks and trigger point injections with me on 6/1/2020- [facility]/ for about 8 weeks, less so than previous occipital nerve blocks   trigger point injections with me on 1/19/2020-  for 2-3 months  bilateral occipital nerve block with Dr. Gross on 11/1/19- !!!! 8-9 weeks  trigger point injections with me on 9/23/19- [facility] for neck pain, continue to have some benefit  trigger point injections with me 8/23/19 - [facility] for 2 weeks neck pain and headaches, 2.5 weeks tops  6. Chiropractic: no  7. Acupuncture: no  8. TENS Unit: no     Imaging:  MRI of cervical spine was completed on 10/29/19 and shows:           Assessment:   Becky Lay is a 60 year old female with a past medical history significant for hyperhidrosis who presents with complaints of neck pain.      1. Neck pain- etiology likely combination of facet arthropathy and occipital neuralgia, s/p C5-6 discectomy and fusion with Dr. Jose at [facility] in March of 2003  2. Mental Health - the patient's mental health concerns, specifically situational depression, affect her experience of pain and contribute to her clinically significant distress.     1. Bilateral occipital neuralgia    2. Tension headache        Plan:                1.  Pain Physical Therapy:                Completed. Continue to practice gentle stretches and exercises with extra self care.               2.  Pain Psychologist to address relaxation, behavioral change, coping style, and other factors important to improvement.                     Completed.              3.  Medication Management:                           1. We discussed various medication options for headache prevention. Would recommend starting with nortriptyline 10mg at bedtime. She will monitor for side effects and pain benefit. If this is not helpful, or the headaches are still bothersome, okay to increase to 20mg at bedtime in about 3 weeks.                          2. If nortriptyline is not helpful, we may consider a trial of gabapentin next. If this medication is not helpful, we will discuss other options or have her see neurology for follow up.               4.  Potential procedures: we discussed that we have tried various injections, occipital nerve blocks and cervical medial branch blocks with variable and recently no pain benefit. I do not recommend additional injections at this time. Has had benefit with trigger point injections for neck pain, can repeat as needed.               5.  Follow up with NANCY Henderson CNP in 4 weeks.     Phone call duration: 24 minutes    NANCY Singletary CNP

## 2020-09-21 ENCOUNTER — TRANSFERRED RECORDS (OUTPATIENT)
Dept: HEALTH INFORMATION MANAGEMENT | Facility: CLINIC | Age: 60
End: 2020-09-21

## 2020-09-21 ENCOUNTER — VIRTUAL VISIT (OUTPATIENT)
Dept: PALLIATIVE MEDICINE | Facility: CLINIC | Age: 60
End: 2020-09-21
Payer: COMMERCIAL

## 2020-09-21 ENCOUNTER — TELEPHONE (OUTPATIENT)
Dept: PALLIATIVE MEDICINE | Facility: CLINIC | Age: 60
End: 2020-09-21

## 2020-09-21 DIAGNOSIS — G44.209 TENSION HEADACHE: ICD-10-CM

## 2020-09-21 DIAGNOSIS — M54.81 BILATERAL OCCIPITAL NEURALGIA: Primary | ICD-10-CM

## 2020-09-21 PROCEDURE — 99213 OFFICE O/P EST LOW 20 MIN: CPT | Mod: TEL | Performed by: NURSE PRACTITIONER

## 2020-09-21 RX ORDER — NORTRIPTYLINE HCL 10 MG
10 CAPSULE ORAL AT BEDTIME
Qty: 60 CAPSULE | Refills: 1 | Status: SHIPPED | OUTPATIENT
Start: 2020-09-21 | End: 2020-10-19

## 2020-09-21 ASSESSMENT — PAIN SCALES - GENERAL: PAINLEVEL: MODERATE PAIN (5)

## 2020-09-21 NOTE — PATIENT INSTRUCTIONS
1.  Pain Physical Therapy:                Completed. Continue to practice gentle stretches and exercises with extra self care.               2.  Pain Psychologist to address relaxation, behavioral change, coping style, and other factors important to improvement.                     Completed.              3.  Medication Management:                           1. Lets start nortriptyline 10mg at bedtime. Monitor for side effects and pain benefit. If this is not helpful, or the headaches are still bothersome, okay to increase to 20mg at bedtime in about 3 weeks.                          2. If nortriptyline is not helpful, we may consider a trial of gabapentin next. If this medication is not helpful, we will discuss other options or have you see neurology for follow up.               4.  Potential procedures: not at this time.              5.  Follow up with NANCY Henderson CNP in 4 weeks.     ----------------------------------------------------------------  Clinic Number:  [phone]     Call with any questions about your care and for scheduling assistance.     Calls are returned Monday through Friday between 8 AM and 4:30 PM. We usually get back to you within 2 business days depending on the issue/request.    If we are prescribing your medications:    For opioid medication refills, call the clinic or send a [employer] message 7 days in advance.  Please include:    Name of requested medication    Name of the pharmacy.    For non-opioid medications, call your pharmacy directly to request a refill. Please allow 3-4 days to be processed.     Per [place] State Law:    All controlled substance prescriptions must be filled within 30 days of being written.      For those controlled substances allowing refills, pickup must occur within 30 days of last fill.      We believe regular attendance is key to your success in our program!      Any time you are unable to keep your appointment we ask that you call us at least 24  hours in advance to cancel.This will allow us to offer the appointment time to another patient.     Multiple missed appointments may lead to dismissal from the clinic.

## 2020-09-28 ENCOUNTER — OFFICE VISIT (OUTPATIENT)
Dept: OBGYN | Facility: CLINIC | Age: 60
End: 2020-09-28
Payer: COMMERCIAL

## 2020-09-28 VITALS — BODY MASS INDEX: 22.66 KG/M2 | DIASTOLIC BLOOD PRESSURE: 72 MMHG | WEIGHT: 132 LBS | SYSTOLIC BLOOD PRESSURE: 120 MMHG

## 2020-09-28 DIAGNOSIS — N95.0 PMB (POSTMENOPAUSAL BLEEDING): Primary | ICD-10-CM

## 2020-09-28 PROCEDURE — 58100 BIOPSY OF UTERUS LINING: CPT | Performed by: OBSTETRICS & GYNECOLOGY

## 2020-09-28 PROCEDURE — 88305 TISSUE EXAM BY PATHOLOGIST: CPT | Performed by: OBSTETRICS & GYNECOLOGY

## 2020-09-28 PROCEDURE — 99204 OFFICE O/P NEW MOD 45 MIN: CPT | Mod: 25 | Performed by: OBSTETRICS & GYNECOLOGY

## 2020-09-28 ASSESSMENT — ENCOUNTER SYMPTOMS
NAUSEA: 0
VOMITING: 0
CHILLS: 0
FEVER: 0
CONSTIPATION: 0
DYSURIA: 0
DIARRHEA: 0
FREQUENCY: 0
UNEXPECTED WEIGHT CHANGE: 0

## 2020-09-28 NOTE — NURSING NOTE
"Chief Complaint   Patient presents with     Biopsy     Post menopausal bleeding        Initial /72 (BP Location: Right arm, Patient Position: Sitting, Cuff Size: Adult Regular)   Wt 59.9 kg (132 lb)   LMP 2008   BMI 22.66 kg/m   Estimated body mass index is 22.66 kg/m  as calculated from the following:    Height as of 19: 1.626 m (5' 4\").    Weight as of this encounter: 59.9 kg (132 lb).  BP completed using cuff size: regular    Questioned patient about current smoking habits.  Pt. has never smoked.          The following HM Due: NONE    Chele Martínez CMA                "

## 2020-09-28 NOTE — PROGRESS NOTES
SUBJECTIVE:                                                   Becky Lay is a 60 year old female who presents to clinic today with the Chief Complaint of:  Patient presents with:  Biopsy: Post menopausal bleeding       Vaginal Bleeding (Postmenopausal)  Onset: 2 weeks ago  Description: Pt has been  x 12 years, on no systemic HRT ever but has been using Estradiol vag cream 2 g twice weekly for a year for vaginal atrophy, who developed episode of VB/  Duration of bleeding episodes: 7 days  Frequency between periods:  Isolated episode  Describe bleeding/flow: Spotting only  Clots: no   Number of hours between maxipads/supertampons: 6 - liner only  Cramping: None    Accompanying Signs & Symptoms:  Weakness: no  Lightheadedness: no  Hot flashes: no  Nosebleeds/Easy bruising: no  Vaginal Discharge: no    History:  Patient's last menstrual period was 2008.  Previous normal periods: YES- prior to  status  Contraceptive use: vasectomy--current partner  Possibility of Pregnancy: no  Any bleeding after intercourse: no  Abnormal PAP Smears: no - Pap WNL, HPV Neg 2019    Precipitating factors:   None    Alleviating factors:  None    Therapies Tried and outcome: None tried      Review of pertinent old records reveals:  Pelvic U/S 2018 - Uterus normal size, 3 mm stripe, right adnexa not seen, left adnexa normal      Patient's last menstrual period was 2008..   Patient is sexually active, .  Using menopause for contraception.    reports that she has never smoked. She has never used smokeless tobacco.    Problem list and histories reviewed & adjusted, as indicated.  Additional history: as documented.    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, 6, 7 DJD     Other and unspecified malignant neoplasm of skin of other and unspecified parts of face     Generalized hyperhidrosis     CARDIOVASCULAR SCREENING; LDL GOAL LESS THAN 160     Menopausal syndrome (hot flashes)     Cervicalgia     PMB  (postmenopausal bleeding)     Past Surgical History:   Procedure Laterality Date     C VASQUES W/O FACETEC FORAMOT/DSKC 1/2 VRT SEG, CERVICAL  2004    anterior c- 5, 6 fusion      Social History     Tobacco Use     Smoking status: Never Smoker     Smokeless tobacco: Never Used   Substance Use Topics     Alcohol use: Not Currently     Alcohol/week: 10.0 standard drinks     Frequency: 2-4 times a month     Comment: one glass per week      Problem (# of Occurrences) Relation (Name,Age of Onset)    Cancer (2) Maternal Grandmother: colon, Maternal Grandfather: lung ca    Dementia (1) Mother    Heart Disease (1) Father: MI age 50/BP and Chol Meds    Lipids (1) Mother    Osteoporosis (1) Mother       Negative family history of: Colon Cancer            Current Outpatient Medications   Medication Sig     acetaminophen (TYLENOL) 500 MG tablet Take 500-1,000 mg by mouth every 6 hours as needed for mild pain     CITRACAL/VITAMIN D PO 2 a day     ibuprofen (ADVIL/MOTRIN) 200 MG tablet Take 200 mg by mouth every 4 hours as needed for mild pain     nortriptyline (PAMELOR) 10 MG capsule Take 1 capsule (10 mg) by mouth At Bedtime     No current facility-administered medications for this visit.      Allergies   Allergen Reactions     No Known Allergies        ROS:  Review of Systems   Constitutional: Negative for chills, fever and unexpected weight change.   Gastrointestinal: Negative for constipation, diarrhea, nausea and vomiting.   Genitourinary: Negative for dysuria, frequency, pelvic pain, urgency and vaginal discharge.   All other systems reviewed and are negative.        OBJECTIVE:     /72 (BP Location: Right arm, Patient Position: Sitting, Cuff Size: Adult Regular)   Wt 59.9 kg (132 lb)   LMP 06/20/2008   BMI 22.66 kg/m    Body mass index is 22.66 kg/m .    Exam:  Physical Exam  Constitutional:       General: She is not in acute distress.     Appearance: She is normal weight. She is not ill-appearing.   HENT:      Head:  Normocephalic.   Pulmonary:      Effort: Pulmonary effort is normal.   Skin:     General: Skin is warm.   Neurological:      Mental Status: She is alert.   Psychiatric:         Mood and Affect: Mood normal.     Abdomen- Abdomen soft, non-tender. BS normal. No masses, organomegaly  Pelvic- Exam chaperoned by nurse, External genitalia normal, Bartholin's glands normal, Hallett's glands normal, Urethral meatus normal, Urethra normal, Bladder normal, Vagina with normal rugae, no abnormal lesions, no abnormal discharge, Normal cervix without lesions or mucopus, no cervical motion tenderness, Uterus surgically absent, Adnexa normal size without masses or tenderness bilaterally, Anus normal; EMBx performed and noted below.      Prior Pertinent Radiology Images Visualized by Me Today:  PELVIC ULTRASOUND WITH ENDOVAGINAL TRANSDUCER IMAGING    9/28/2018  1:37 PM      HISTORY:  Bloated abdomen.     TECHNIQUE:  Endovaginal sonography was added to the transabdominal  exam to better evaluate the uterus and ovaries.     COMPARISON: None.     FINDINGS:   Endometrium: Endometrium is 3 mm thick.     Uterus: Measures 6 x 4.2 x 2.2 cm. No focal myometrial lesions  identified.     Right ovary: Not visualized, obscured by bowel gas.     Left ovary: Normal measuring 1.6 x 1.2 x 1 cm.     Additional findings: None.                                                                      IMPRESSION:    1. Right ovary not able to be visualized for assessment. If symptoms  continue, CT should be considered.  2. Remainder of the study is unremarkable.     CANDICE TRAMMELL MD        PROCEDURE:  Endometrial Biopsy      Indications for procedure:  Evaluation of PMB    Pre-Procedure Medications:  None    The proposed procedure to diagnose the above condition was explained to the patient.  Risks, side effects, benefits, and alternatives were discussed. All questions were answered to patient's satisfaction. The patient gave informed consent.       The patient  was placed in the dorsal lithotomy position and the perineum was exposed.  External genitalia appeared normal.  The uterus was mobile with normal size, shape, and consistency, without tenderness.  The adnexae palpated normally on bimanual exam without mass or tenderness. The uterus was anteverted  Appropriate sterile technique was used throughout the procedure.  A speculum was inserted and the cervix was visualized.  The cervix was cleansed with antiseptic solution.  A tenaculum was applied to the anterior lip of the cervix.  The uterus was sounded to 6 cm.  The Pipelle Endometrial Suction Curette was used and 1 rotating pass(s) were made between the fundus and the internal os. A scant sample was obtained and sent to pathology.  Instruments were removed.  The patient experienced mild cramping during the procedure.  This subsided rapidly after instruments were removed.  The patient remained supine for a few moments after the procedure and had no other complications. No heavy bleeding was observed.       The patient was instructed to report any fever, cramping after 48 hours, or bleeding for 24-48 hours heavier than normal menses. OTC NSAIDs may be used for cramping PRN. Sexual relations may be resumed in 2-3 days, or after bleeding has stopped.   Pt. is to contact our office if she has not received the pathology report within 1-2 weeks of the procedure.        ASSESSMENT:                                                      Encounter Diagnoses   Name Primary?     PMB (postmenopausal bleeding)          PLAN:                                                      1)  F/U EMBx path.    2)  Pelvic U/S to confirm stripe is still < 5 mm, and to confirm normal uterus and adnexa again.    3)  If EMBx is insuff for Dx, atrophic, or inactive, and U/S shows thin stripe, then no further w/u needed.  If EMBx shows pathology, Rx as appropriate.  If EMBx benign but U/S shows thickened stripe, will schedule for Op Hyst w/ [facility],  D&C.    4)  Pt to change her Estrace vag cream regimen to 1 g vaginally at bedtime x 21 days, then stop.  Can repeat course every 3-4 months prn.  This may reduce the likelihood of endometrial proliferation over longer periods of use which can lead to PMB.      Blas Leach MD  [facility]

## 2020-09-29 LAB — COPATH REPORT: NORMAL

## 2020-10-14 ENCOUNTER — ANCILLARY PROCEDURE (OUTPATIENT)
Dept: ULTRASOUND IMAGING | Facility: CLINIC | Age: 60
End: 2020-10-14
Attending: OBSTETRICS & GYNECOLOGY
Payer: COMMERCIAL

## 2020-10-14 DIAGNOSIS — N95.0 PMB (POSTMENOPAUSAL BLEEDING): ICD-10-CM

## 2020-10-14 PROCEDURE — 76856 US EXAM PELVIC COMPLETE: CPT

## 2020-10-14 PROCEDURE — 76830 TRANSVAGINAL US NON-OB: CPT

## 2020-10-16 NOTE — PROGRESS NOTES
"Becky Lay is a 60 year old female who is being evaluated via a billable telephone visit.      The patient has been notified of following:     \"This telephone visit will be conducted via a call between you and your physician/provider. We have found that certain health care needs can be provided without the need for a physical exam.  This service lets us provide the care you need with a short phone conversation.  If a prescription is necessary we can send it directly to your pharmacy.  If lab work is needed we can place an order for that and you can then stop by our lab to have the test done at a later time.    Telephone visits are billed at different rates depending on your insurance coverage. During this emergency period, for some insurers they may be billed the same as an in-person visit.  Please reach out to your insurance provider with any questions.    If during the course of the call the physician/provider feels a telephone visit is not appropriate, you will not be charged for this service.\"    Patient has given verbal consent for Telephone visit?  Yes    What phone number would you like to be contacted at? [phone]    How would you like to obtain your AVS? Niurka Elam MA  [facility]    Recommendations at last vist on 9/21/2020:          1.  Pain Physical Therapy:                Completed. Continue to practice gentle stretches and exercises with extra self care.               2.  Pain Psychologist to address relaxation, behavioral change, coping style, and other factors important to improvement.                     Completed.              3.  Medication Management:                           1. We discussed various medication options for headache prevention. Would recommend starting with nortriptyline 10mg at bedtime. She will monitor for side effects and pain benefit. If this is not helpful, or the headaches are still bothersome, okay to increase to 20mg at " "bedtime in about 3 weeks.                          2. If nortriptyline is not helpful, we may consider a trial of gabapentin next. If this medication is not helpful, we will discuss other options or have her see neurology for follow up.               4.  Potential procedures: we discussed that we have tried various injections, occipital nerve blocks and cervical medial branch blocks with variable and recently no pain benefit. I do not recommend additional injections at this time. Has had benefit with trigger point injections for neck pain, can repeat as needed.               5.  Follow up with NANCY Henderson CNP in 4 weeks.      Additional provider notes:  -Her pain is somewhat better than it was at last visit.  -She attributes the improvement to be due to medications. States, \"this weekend I feel like my old safe, I was so happy. My  was so happy.\"   -She started nortriptyline as directed, increased to 20mg last Tuesday. Does report some mild grogginess  For the first week but then this subsided.    -She reports since last visit her headachesn are less frequent and less intense.   -She has been keeping track of her headaches with her pain diary. States the last time she had a headache was 10/9/2020, the last time she took medication for a headaches was 9/27/2020. She comments that her diary has less notes with the improved headaches.   -She has the greatest potential for a headaches in the morning, usually if she reads her headaches worsen.  -She continues to practice stretches and exercises on a regular basis.     Current pain medications:    Nortriptyline 20mg at bedtime- [facility], increased to this dose 10/13/2020              Flexeril 10mg prn- [facility], hasn't taken since September              Ibuprofen 400mg BID or TID- [facility],  hasn't taken since September              Tylenol 650mg prn - [facility], not needed lately     1. Previous Pain Relevant Medications:  NOTE: This medication information taken from patient's " "intake form, not medical records.               Opiates:  Vicodin- [facility], nausea/vomiting, \"no I don't want it\"               NSAIDS: ibuprofen- [facility], naproxen- [facility]              Muscle Relaxants: Flexeril- [facility] usually              Anti-migraine mediations: no              Anti-depressants: no              Sleep aids: no              Anxiolytics: no              Neuropathics: no                       Topicals: no              Other medications not covered above: Tylenol- ?, Prednisone- [facility]     2. Physical Therapy: yes prior to surgery and after- [facility], practices occasionally, pain physical therapy- [facility]  3. Pain Psychology: yes [facility]- [facility]  4. Surgery:  C5-6 discectomy and fusion with Dr. Jose at [facility] in March of 2003  5. Injections: repeat bilateral occipital nerve blocks (with steroid) with me on 8/25/2020- [facility]  bilateral TON, C3,4 medial branch blocks #1 with Dr. Gross on 8/10/2020- [facility], [facility] for a few days  bilateral occipital nerve blocks and trigger point injections with me on 6/1/2020- [facility]/ for about 8 weeks, less so than previous occipital nerve blocks   trigger point injections with me on 1/19/2020-  for 2-3 months  bilateral occipital nerve block with Dr. Gross on 11/1/19- !!!! 8-9 weeks  trigger point injections with me on 9/23/19- [facility] for neck pain, continue to have some benefit  trigger point injections with me 8/23/19 - [facility] for 2 weeks neck pain and headaches, 2.5 weeks tops  6. Chiropractic: no, \"people have always recommended it\"   7. Acupuncture: no  8. TENS Unit: no     Imaging:  MRI of cervical spine was completed on 10/29/19 and shows:           Assessment:   Becky Lay is a 60 year old female with a past medical history significant for hyperhidrosis who presents with complaints of neck pain.      1. Neck pain- etiology likely combination of facet arthropathy and myofascial pain, s/p C5-6 discectomy and fusion with Dr. Jose at [facility] in March of " 2003.  2. Headaches- etiology likely combination of occipital neuralgia and tension headaches. Myofascial pain   3. Mental Health - the patient's mental health concerns, specifically situational depression, affect her experience of pain and contribute to her clinically significant distress.     1. Bilateral occipital neuralgia    2. Tension headache    3. Myofascial pain        Plan:     1.  Pain Physical Therapy:                Completed. Continue to practice gentle stretches and exercises with extra self care.    2.  Pain Psychologist to address relaxation, behavioral change, coping style, and other factors important to improvement.    Completed.    3.  Medication Management:     1. Becky Montenegro reports very good benefit in reduction of headaches with nortriptyline. I advised she continue nortriptyline 20mg at bedtime for now (just increased to this dose on 10/13/2020).  After 10/27/2020, if she is still finding headaches bothersome (even if intermittent or have the potential for headaches) okay to increase to 30mg at bedtime. She may have grogginess for a few days. Call with issues.     2. Okay to take naproxen as needed for more bothersome headaches.    4.  Potential procedures: okay to repeat trigger point injections whenever she feels this pain is returning. Last had trigger point injections in June. Call or MyChart to let me know.     5.  Referrals: I think chiropractic care and acupuncture could be a helpful addition and she is very interested, has never tried these resources before. Order placed today. They will call to schedule.    6.  Follow up with NANCY Henderson CNP in 5-6 weeks.       Phone call duration: 23 minutes    NANCY Singletary CNP

## 2020-10-19 ENCOUNTER — VIRTUAL VISIT (OUTPATIENT)
Dept: PALLIATIVE MEDICINE | Facility: CLINIC | Age: 60
End: 2020-10-19
Payer: COMMERCIAL

## 2020-10-19 DIAGNOSIS — G44.209 TENSION HEADACHE: ICD-10-CM

## 2020-10-19 DIAGNOSIS — M79.18 MYOFASCIAL PAIN: ICD-10-CM

## 2020-10-19 DIAGNOSIS — M54.81 BILATERAL OCCIPITAL NEURALGIA: Primary | ICD-10-CM

## 2020-10-19 PROCEDURE — 99213 OFFICE O/P EST LOW 20 MIN: CPT | Mod: TEL | Performed by: NURSE PRACTITIONER

## 2020-10-19 RX ORDER — NORTRIPTYLINE HCL 10 MG
30 CAPSULE ORAL AT BEDTIME
Qty: 90 CAPSULE | Refills: 1 | Status: SHIPPED | OUTPATIENT
Start: 2020-10-19 | End: 2020-11-30

## 2020-10-19 ASSESSMENT — PAIN SCALES - GENERAL: PAINLEVEL: NO PAIN (1)

## 2020-10-19 NOTE — PATIENT INSTRUCTIONS
1.  Pain Physical Therapy:                Completed. Continue to practice gentle stretches and exercises with extra self care.    2.  Pain Psychologist to address relaxation, behavioral change, coping style, and other factors important to improvement.    Completed.    3.  Medication Management:     1. Continue nortriptyline 20mg at bedtime. After 10/27/2020, if you are finding headaches still bothersome (even if intermittent or have the potential for headaches) increase to 30mg at bedtime. You may have grogginess for a few days. Call with issues.     2. Okay to take naproxen as needed for more bothersome headaches.    4.  Potential procedures: okay to repeat trigger point injections whenever you feel this pain is returning. Call or [facility] to let me know.     5.  Referrals: I think chiropractic care and acupuncture could be a helpful addition Order placed today. They will call to schedule.    6.  Follow up with NANCY Henderson CNP in 5-6 weeks.       ----------------------------------------------------------------  Clinic Number:  [phone]     Call with any questions about your care and for scheduling assistance.     Calls are returned Monday through Friday between 8 AM and 4:30 PM. We usually get back to you within 2 business days depending on the issue/request.    If we are prescribing your medications:    For opioid medication refills, call the clinic or send a [facility] message 7 days in advance.  Please include:    Name of requested medication    Name of the pharmacy.    For non-opioid medications, call your pharmacy directly to request a refill. Please allow 3-4 days to be processed.     Per [place] State Law:    All controlled substance prescriptions must be filled within 30 days of being written.      For those controlled substances allowing refills, pickup must occur within 30 days of last fill.      We believe regular attendance is key to your success in our program!      Any time you are unable to keep  your appointment we ask that you call us at least 24 hours in advance to cancel.This will allow us to offer the appointment time to another patient.     Multiple missed appointments may lead to dismissal from the clinic.

## 2020-10-26 ENCOUNTER — TELEPHONE (OUTPATIENT)
Dept: PALLIATIVE MEDICINE | Facility: CLINIC | Age: 60
End: 2020-10-26

## 2020-10-26 NOTE — TELEPHONE ENCOUNTER
Called [facility].  Spoke with Pharmacist. Advised they can fill the Nortriptyline early. We are adjusting her dosage. Pharmacist states it is the insurance company denying the early refill.  Advised the previous prescription was for one tablet at bedtime and now the new prescription says take 3 tablets at bedtime.  Pharmacist states he will have to contact the insurance to do an over ride due to the dosage increase. He will work on that when it is not so busy.    Called Becky Montenegro. Informed her I call the pharmacy and spoke with the pharmacist.  With the increase dose of the Nortriptyline the pharmacist has to do an over ride with her insurance.  The pharmacist will work on it later today since he was busy currently.  She will call them on Wednesday to see if this has been processed.  Advised not to wait to contact the pharmacy when she is taking her last dose of medication. She understands these instructions and will stay in contact with her pharmacy. She will call us back if needed.    Ursula Marquez RN  Care Coordinator  [facility]

## 2020-10-26 NOTE — TELEPHONE ENCOUNTER
Per pt her pharmacy will not refill her rx for nortriptyline (PAMELOR) 10 MG capsule until November 5th. Per pt she runs out 10/31. Pt is requesting Oumar to call the pharmacy at [phone].      Isabel SIMPSON    [facility] [place]

## 2020-11-10 ENCOUNTER — THERAPY VISIT (OUTPATIENT)
Dept: CHIROPRACTIC MEDICINE | Facility: CLINIC | Age: 60
End: 2020-11-10
Attending: NURSE PRACTITIONER
Payer: COMMERCIAL

## 2020-11-10 DIAGNOSIS — M54.81 BILATERAL OCCIPITAL NEURALGIA: ICD-10-CM

## 2020-11-10 DIAGNOSIS — M54.2 CERVICALGIA: ICD-10-CM

## 2020-11-10 DIAGNOSIS — M99.01 CERVICAL SEGMENT DYSFUNCTION: Primary | ICD-10-CM

## 2020-11-10 DIAGNOSIS — M79.18 MYOFASCIAL PAIN: ICD-10-CM

## 2020-11-10 PROCEDURE — 98940 CHIROPRACT MANJ 1-2 REGIONS: CPT | Mod: AT | Performed by: CHIROPRACTOR

## 2020-11-10 PROCEDURE — 97810 ACUP 1/> WO ESTIM 1ST 15 MIN: CPT | Performed by: CHIROPRACTOR

## 2020-11-10 PROCEDURE — 99203 OFFICE O/P NEW LOW 30 MIN: CPT | Mod: 25 | Performed by: CHIROPRACTOR

## 2020-11-10 NOTE — PROGRESS NOTES
Chiropractic Clinic Visit    PCP: Paty Thomson    Becky Lay is a 60 year old female who is seen  in consultation at the request of  Elsa Nj C.N.P. presenting with chronic neck pain and headaches. Patient reports that the onset was July 2019 for unknown reasons. Patient reports she might have slept wrong, however doesn't recall any specific incident.  When asked, patient denies:, falling, slipping or bending and reaching. Patient reports having on/off neck pain for many year. States having cervical spine fusion at C5-C6 in 2003 for neck and radicular arm pain with weakness. Ongoing on/off neck pain for year, the headaches increasing in the last year is what has caused Becky Montenegro to seek more care  Prior to onset, the patient was able to sit and read for 2 hours. Patient notes that due to symptoms, they can only read or do computer work for a few minutes without increased neck pain or headaches. Becky Montenegro wakes up with neck stiffness every morning. Patient reports having daily headaches for a year. Most HA's range in pain from 3/10-5/10, did have pain at 9/10 2-3x in the last 6 months.  Becky Lay notes   sleeping rated at a 3/10 difficulty  and prior to this incident it was 0/10.    Injury: No known injury    Location of Pain: bilateral mid to upper neck pain with headaches from the base of the skull over the top of the head at the following level(s) Occiput, C1 , C2 , C3 , C7  and T2   Duration of Pain: 16 months for this exacerbation  Rating of Pain at worst: 5/10, 9/10 for headaches on a couple of occasions.  Rating of Pain Currently: 3/10  Symptoms are better with: Rest  Symptoms are worse with: turning her head, driving, reading  Additional Features: Denies UE radicular pain. HA's are everyday.       Health History  as reported by the patient:    How does the patient rate their own health:   Good    Current or past medical history:   Osteoarthritis    Medical  allergies  None    Past Traumas/Surgeries  Orthopedic: cervical spine fusion at C5-C6 in 2003    Family History  This patient has no significant family history    Medications:  other:  Nortriptyline, anti-depressants, anti-inflammatory, muscle relaxant     Occupation:  Retired    Primary job tasks:   NA    Barriers as home/work:   none    Additional health Issues:     NA      Review of Systems  Musculoskeletal: as above  Remainder of review of systems is negative including constitutional, CV, pulmonary, GI, Skin and Neurologic except as noted in HPI or medical history.    Past Medical History:   Diagnosis Date     DJD C56,7      Past Surgical History:   Procedure Laterality Date     C VASQUES W/O FACETEC FORAMOT/[facility] 1/2 VRT SEG, CERVICAL  2004    anterior c- 5, 6 fusion       [id number]  LMP 06/20/2008     GENERAL APPEARANCE: healthy, alert and no distress   GAIT: NORMAL  SKIN: no suspicious lesions or rashes  NEURO: Normal strength and tone, mentation intact and speech normal  PSYCH:  mentation appears normal and affect normal/bright      Cervical Spine Exam    Range of Motion:         Slight reduction in active and passive ROM forward flexion, extension, lateral rotation, lateral flexion. Mild neck pain on flexion, left rotation, left lateral flexion    Inspection:         No visible deformity    Tender:        paraspinal muscles       upper border of trapezius, suboccipitals, scalenes    Non-Tender:        remainder of cervical spine area    Muscle strength:       C5 (shoulder abduction) symmetric 5/5 Normal       C6 (elbow flexion) symmetric 5/5 Normal       C7 (elbow extension) symmetric 5/5 Normal       C8 (finger abduction, thumb flexion) symmetric 5/5 Normal    Reflexes:        C5 (biceps) symmetric 2 bilaterally       C6 (supinator) symmetric 2 bilaterally       C7 (triceps) symmetric 2 bilaterally    Sensation:       grossly intact througout upper extremities bilaterally    Special Tests:  Distraction -  negative and cervical compression-Neg, foraminal compression left and right-produced neck pain    Lymphatics:        no edema noted in the upper extremities       Segmental spinal dysfunction/restrictions found at:  Occiput, C1 , C3 , C7  and T2       The following soft tissue hypotonicities were observed:   Sub-occiput: bilateral, ache and stiff, referred pain: no    Trigger points were found in:  Sub-occipital and Traps    Muscle spasm found in:  Sub-occipital, Traps and scalenes:       Radiology:  none    Assessment:    1. Bilateral occipital neuralgia    2. Myofascial pain        RX ordered/plan of care  Anticipated outcomes  Possible risks and side effects    After discussing the risk and benefits of care, patient consented to treatment    Patient's condition:  Patient had restrictions pre-manipulation    Treatment effectiveness:  Post manipulation there is better intersegmental movement and Patient claims to feel looser post manipulation    Plan:    Procedures:    Evaluation and Management:  91626 Moderate level exam 30 min    CMT:  09235 Chiropractic manipulative treatment 1-2 regions performed   Cervical: Diversified and Activator, C1 , C3 , Supine w/ STM to scalenes, suboccipitals     Modalities:  10460: Acupuncture, for 15 minutes:  Points: for neck pain, occipital HA's-added Li4, Celaya Siegel pt, GV pt-patient seated with pillow on lap    Therapeutic procedures:  None    Response to Treatment  Reduction in symptoms as reported by patient    Prognosis: Good      Treatment plan and goals:  Goals:  SLEEPING: the patient specific goal is to attain his pre-injury status of 7 hours comfortably  Decrease HA's from daily to 1-2x per month. Decrease intensity of worst HA's from 5/10 10 to 1-2/10 pain level.    Frequency of care  Duration of care is estimated to be 8 weeks, from the initial treatment.  It is estimated that the patient will need a total of 4-6 visits to resolve this episode.  For the initial therapeutic  trial of care, the frequency is recommended at 2 X a month, once daily.  A reevaluation would be clinically appropriate in 6 visits, to determine progress and further course of care.    In-Office Treatment  Evaluation  93154 Chiropractic manipulative treatment 1-2 regions performed   Cervical: Diversified and Activator, C1 , C3 , Supine w/ STM to scalenes, suboccipitals     Modalities:  15253: Acupuncture, for 15 minutes:  Points: for neck pain, occipital HA's-added Li4, Celaya Siegel pt, GV pt-patient seated with pillow on lap    Recommendations:    Instructions:  mindful of good sleeping posture with proper pillow    Follow-up:  Return to care in 1-2 weeks.      Disclaimer: This note consists of symbols derived from keyboarding, dictation and/or voice recognition software. As a result, there may be errors in the script that have gone undetected. Please consider this when interpreting information found in this chart.

## 2020-11-13 DIAGNOSIS — Z12.31 VISIT FOR SCREENING MAMMOGRAM: ICD-10-CM

## 2020-11-24 NOTE — PROGRESS NOTES
"Becky Lay is a 60 year old female who is being evaluated via a billable telephone visit.      The patient has been notified of following:     \"This telephone visit will be conducted via a call between you and your physician/provider. We have found that certain health care needs can be provided without the need for a physical exam.  This service lets us provide the care you need with a short phone conversation.  If a prescription is necessary we can send it directly to your pharmacy.  If lab work is needed we can place an order for that and you can then stop by our lab to have the test done at a later time.    Telephone visits are billed at different rates depending on your insurance coverage. During this emergency period, for some insurers they may be billed the same as an in-person visit.  Please reach out to your insurance provider with any questions.    If during the course of the call the physician/provider feels a telephone visit is not appropriate, you will not be charged for this service.\"    Patient has given verbal consent for Telephone visit?  Yes    What phone number would you like to be contacted at? [phone]    How would you like to obtain your AVS? Niurka Elam MA  [facility]      Recommendations at last vist on 10/19/2020:   1.  Pain Physical Therapy:                Completed. Continue to practice gentle stretches and exercises with extra self care.    2.  Pain Psychologist to address relaxation, behavioral change, coping style, and other factors important to improvement.    Completed.    3.  Medication Management:     1. Becky Montenegro reports very good benefit in reduction of headaches with nortriptyline. I advised she continue nortriptyline 20mg at bedtime for now (just increased to this dose on 10/13/2020).  After 10/27/2020, if she is still finding headaches bothersome (even if intermittent or have the potential for headaches) okay to increase to " "30mg at bedtime. She may have grogginess for a few days. Call with issues.     2. Okay to take naproxen as needed for more bothersome headaches.    4.  Potential procedures: okay to repeat trigger point injections whenever she feels this pain is returning. Last had trigger point injections in June. Call or MyChart to let me know.     5.  Referrals: I think chiropractic care and acupuncture could be a helpful addition and she is very interested, has never tried these resources before. Order placed today. They will call to schedule.    6.  Follow up with NANCY Henderson CNP in 5-6 weeks.      Additional provider notes:  -Her pain is better than it was at last visit.  -States since 10/27/2020, \"I am basically headache free.\" She increased nortriptyline to 30mg at bedtime on this day. She notes improvement in headache, nausea, and anxiety. She denies grogginess with this medication, only had during the first week of starting. She does have some reflux in the evening with this but if she takes early enough this is manageable.   -She attributes the improvement to be due to increased dose of nortriptyline, also had acupuncture sessions as well so wonders if this contributed.  -She had one visit with Lashae Garcia DC for acupuncture. She wonders how much this continued to improved headaches, has three sessions scheduled in December.   -She has been able to eliminate naproxen, taken Tylenol only once since last visit.   -She continues to stretch on a daily basis, staying relaxed as best she can. Her neck stiffness remains improved, doesn't feel the need to repeat trigger point injection quite yet.   -She started to read again which she is happy about, previously couldn't tolerate.    Current pain medications:    Nortriptyline 30mg at bedtime- [facility], increased to this dose 10/27/2020, nearly eliminated headaches since this adjustment               Flexeril 5-10mg prn- [facility], taken for neck stiffness twice since last " "visit              Ibuprofen 400mg BID or TID- [facility],  hasn't taken since September              Tylenol 650mg prn - [facility], once since last visit     1. Previous Pain Relevant Medications:  NOTE: This medication information taken from patient's intake form, not medical records.               Opiates:  Vicodin- [facility], nausea/vomiting, \"no I don't want it\"               NSAIDS: ibuprofen- [facility], naproxen- [facility]              Muscle Relaxants: Flexeril- [facility] usually              Anti-migraine mediations: no              Anti-depressants: no              Sleep aids: no              Anxiolytics: no              Neuropathics: no                       Topicals: no              Other medications not covered above: Tylenol- ?, Prednisone- [facility]     2. Physical Therapy: yes prior to surgery and after- [facility], practices occasionally, pain physical therapy- [facility]  3. [facility]  4. Surgery:  C5-6 discectomy and fusion with Dr. Jose at [facility] in March of 2003  5. Injections: repeat bilateral occipital nerve blocks (with steroid) with me on 8/25/2020- [facility]  bilateral TON, C3,4 medial branch blocks #1 with Dr. Gross on 8/10/2020- [facility], [facility] for a few days  bilateral occipital nerve blocks and trigger point injections with me on 6/1/2020- [facility]/ for about 8 weeks, less so than previous occipital nerve blocks   trigger point injections with me on 1/19/2020-  for 2-3 months  bilateral occipital nerve block with Dr. Gross on 11/1/19- [facility]!!!! 8-9 weeks  trigger point injections with me on 9/23/19- [facility] for neck pain, continue to have some benefit  trigger point injections with me 8/23/19 - [facility] for 2 weeks neck pain and headaches, 2.5 weeks tops  6. Chiropractic: no, \"people have always recommended it\"   7. Acupuncture: no  8. TENS Unit: no     Imaging:  MRI of cervical spine was completed on 10/29/19 and shows:           Assessment:   Becky Lay is a 60 year old female with a past medical history significant " for hyperhidrosis who presents with complaints of neck pain.      1. Neck pain- etiology likely combination of facet arthropathy and myofascial pain, s/p C5-6 discectomy and fusion with Dr. Jose at [facility] in March of 2003.  2. Headaches- etiology likely combination of occipital neuralgia and tension headaches. Myofascial pain   3. Mental Health - the patient's mental health concerns, specifically situational depression, affect her experience of pain and contribute to her clinically significant distress.     1. Tension headache    2. Bilateral occipital neuralgia    3. Cervical spondylosis without myelopathy        Plan:     1.  Pain Physical Therapy:    Completed. Encouraged Becky Montenegro continue to practice gentle stretches and exercises on a regular basis with extra self care.    2.  Pain Psychologist to address relaxation, behavioral change, coping style, and other factors important to improvement.  NO   3.  Medication Management:     1. Since increasing no to 30mg, headaches have nearly been eliminated. Advised she continue nortriptyline 30mg at bedtime for now. After the first of the new year, we may consider adjusting dose to 25mg- this way she would only have to take one pill at bedtime and may find it just as effective.    2. Okay to take Tylenol and Flexeril as needed.    4.  Potential procedures: advised Becky Montenegro monitor need for trigger point injections, can repeat at any time.     5.  Referrals: continue sessions with Lashae Garcia DC for acupuncture (has three sessions scheduled in December)- monitor pain and relaxation benefit.    6.  Follow up with NANCY Henderson CNP in 4 weeks. If things are going quite well around that time, okay to postpone for another few weeks with Dr. Gross. Depending if pain/headaches remain stable, may be able to plan for only follow up as needed.       Phone call duration: 21 minutes    NANCY Singletary CNP

## 2020-11-30 ENCOUNTER — VIRTUAL VISIT (OUTPATIENT)
Dept: PALLIATIVE MEDICINE | Facility: CLINIC | Age: 60
End: 2020-11-30
Payer: COMMERCIAL

## 2020-11-30 DIAGNOSIS — M54.81 BILATERAL OCCIPITAL NEURALGIA: ICD-10-CM

## 2020-11-30 DIAGNOSIS — M47.812 CERVICAL SPONDYLOSIS WITHOUT MYELOPATHY: ICD-10-CM

## 2020-11-30 DIAGNOSIS — G44.209 TENSION HEADACHE: Primary | ICD-10-CM

## 2020-11-30 PROCEDURE — 99213 OFFICE O/P EST LOW 20 MIN: CPT | Mod: TEL | Performed by: NURSE PRACTITIONER

## 2020-11-30 RX ORDER — NORTRIPTYLINE HCL 10 MG
30 CAPSULE ORAL AT BEDTIME
Qty: 90 CAPSULE | Refills: 3 | Status: SHIPPED | OUTPATIENT
Start: 2020-11-30 | End: 2021-04-19

## 2020-11-30 ASSESSMENT — PAIN SCALES - GENERAL: PAINLEVEL: NO PAIN (1)

## 2020-11-30 NOTE — PATIENT INSTRUCTIONS
1.  Pain Physical Therapy:    Completed. Continue to practice gentle stretches and exercises with extra self care.    2.  Pain Psychologist to address relaxation, behavioral change, coping style, and other factors important to improvement.  NO   3.  Medication Management:     1. Continue nortriptyline 30mg at bedtime for now. After the first of the new year, lets consider adjusting dose to 25mg- this way you would only have to take one pill at bedtime and you may find it just as effective.    2. Okay to take Tylenol and Flexeril as needed.    4.  Potential procedures: monitor need for trigger point injections, can repeat this at any time.     5.  Referrals: continue sessions with Lashae Garcia DC for acupuncture- monitor pain and relaxation benefit.    6.  Follow up with NANCY Henderson CNP in 4 weeks. If things are going quite well around that time, okay to postpone for another few weeks with Dr. Gross. Depending if pain/headaches remain stable, may be able to plan for only follow up as needed.       ----------------------------------------------------------------  Clinic Number:  [phone]     Call with any questions about your care and for scheduling assistance.     Calls are returned Monday through Friday between 8 AM and 4:30 PM. We usually get back to you within 2 business days depending on the issue/request.    If we are prescribing your medications:    For opioid medication refills, call the clinic or send a [employer] message 7 days in advance.  Please include:    Name of requested medication    Name of the pharmacy.    For non-opioid medications, call your pharmacy directly to request a refill. Please allow 3-4 days to be processed.     Per [place] State Law:    All controlled substance prescriptions must be filled within 30 days of being written.      For those controlled substances allowing refills, pickup must occur within 30 days of last fill.      We believe regular attendance is barragan to your success  in our program!      Any time you are unable to keep your appointment we ask that you call us at least 24 hours in advance to cancel.This will allow us to offer the appointment time to another patient.     Multiple missed appointments may lead to dismissal from the clinic.

## 2020-12-02 ENCOUNTER — THERAPY VISIT (OUTPATIENT)
Dept: CHIROPRACTIC MEDICINE | Facility: CLINIC | Age: 60
End: 2020-12-02
Payer: COMMERCIAL

## 2020-12-02 DIAGNOSIS — M99.01 CERVICAL SEGMENT DYSFUNCTION: Primary | ICD-10-CM

## 2020-12-02 DIAGNOSIS — M54.2 CERVICALGIA: ICD-10-CM

## 2020-12-02 PROCEDURE — 98940 CHIROPRACT MANJ 1-2 REGIONS: CPT | Mod: AT | Performed by: CHIROPRACTOR

## 2020-12-02 PROCEDURE — 97810 ACUP 1/> WO ESTIM 1ST 15 MIN: CPT | Performed by: CHIROPRACTOR

## 2020-12-02 NOTE — PROGRESS NOTES
Visit #:  2 of 6, based on treatment plan    Subjective:  Becky Lay is a 60 year old female who is seen in f/u up for: presenting with chronic neck pain and headaches. Patient reports that the onset was July 2019 for unknown reasons.     Data Unavailable.     Since last visit on 11/10/20,  Becky Lay reports the following changes: Pain immediately after last treatment: 3/10 and their pain level today 3/10.  Patient reports a decrease in headaches and headache intensity since the last visit. States she was able to read for the first time in many months. Neck tension is about the same.    Area of chief complaint:  Cervical :  Symptoms are graded at 3/10. The quality is described as stiff, achey, dull.  Motion has increased, but is still not normal, C1, C3. Patient feels that they are improved due to a reduction in symptoms.        Objective:  The following was observed:    P: palpatory tendernessSub-occipital and Traps Bilaterally    A: static palpation demonstrates intersegmental asymmetry , cervical    R: motion palpation notes restricted motion, Occiput, C1 , C3  and C6     T: The following soft tissue hypotonicities were observed:  Sub-occiput: bilateral, ache and stiff, referred pain: no      Assessment:    Segmental spinal dysfunction/restrictions found at:  Occiput  C1   C3   C6     Diagnoses:    No diagnosis found.    Patient's condition:  Patient had restrictions pre-manipulation    Treatment effectiveness:  Post manipulation there is better intersegmental movement and Patient claims to feel looser post manipulation      Procedures:  CMT:  01806 Chiropractic manipulative treatment 1-2 regions performed   Cervical: Diversified and Activator, C1 , C3 , Supine w/ STM to scalenes, suboccipitals      Modalities:  15362: Acupuncture, for 15 minutes:  Points: for neck pain, occipital HA's-added Li4, Celaya Siegel pt, GV pt-patient seated with pillow on lap    Therapeutic procedures:  None      Prognosis:  Good    Progress towards Goals: Patient is making progress towards the goal     Response to Treatment:   Reduction in symptoms as reported by patient      Recommendations:    Instructions:  ice 20 minutes every other hour as needed and mindful of good postures    Follow-up:   Return to care in one week

## 2020-12-09 ENCOUNTER — THERAPY VISIT (OUTPATIENT)
Dept: CHIROPRACTIC MEDICINE | Facility: CLINIC | Age: 60
End: 2020-12-09
Payer: COMMERCIAL

## 2020-12-09 DIAGNOSIS — M54.2 CERVICALGIA: ICD-10-CM

## 2020-12-09 DIAGNOSIS — M99.01 CERVICAL SEGMENT DYSFUNCTION: Primary | ICD-10-CM

## 2020-12-09 PROCEDURE — 97810 ACUP 1/> WO ESTIM 1ST 15 MIN: CPT | Performed by: CHIROPRACTOR

## 2020-12-09 PROCEDURE — 98940 CHIROPRACT MANJ 1-2 REGIONS: CPT | Mod: AT | Performed by: CHIROPRACTOR

## 2020-12-09 NOTE — PROGRESS NOTES
Visit #:  3 of 6, based on treatment plan    Subjective:  Becky Lay is a 60 year old female who is seen in f/u up for: presenting with chronic neck pain and headaches. Patient reports that the onset was July 2019 for unknown reasons.     Data Unavailable.     Since last visit on 12/2/20,  Becky Lay reports the following changes: Pain immediately after last treatment: 3/10 and their pain level today 2/10.  Patient reports a decrease in headaches and headache intensity since the last visit. States she was able to read for the first time in many months. Neck tension is slightly improved.    Area of chief complaint:  Cervical :  Symptoms are graded at 2/10. The quality is described as stiff, achey, dull.  Motion has increased, but is still not normal, C1, C3. Patient feels that they are improved due to a reduction in symptoms.        Objective:  The following was observed:    P: palpatory tendernessSub-occipital and Traps Bilaterally    A: static palpation demonstrates intersegmental asymmetry , cervical    R: motion palpation notes restricted motion, Occiput, C1 , C3  and C6     T: The following soft tissue hypotonicities were observed:  Sub-occiput: bilateral, ache and stiff, referred pain: no      Assessment:    Segmental spinal dysfunction/restrictions found at:  Occiput  C1   C3   C6     Diagnoses:    No diagnosis found.    Patient's condition:  Patient had restrictions pre-manipulation    Treatment effectiveness:  Post manipulation there is better intersegmental movement and Patient claims to feel looser post manipulation      Procedures:  CMT:  35618 Chiropractic manipulative treatment 1-2 regions performed   Cervical: Diversified and Activator, C1 , C3 , Supine w/ STM to scalenes, suboccipitals      Modalities:  64193: Acupuncture, for 15 minutes:  Points: for neck pain, occipital HA's-added Li4, Celaya Siegel pt, GV pt-patient seated with pillow on lap    Therapeutic procedures:  None      Prognosis:  Good    Progress towards Goals: Patient is making progress towards the goal     Response to Treatment:   Reduction in symptoms as reported by patient      Recommendations:    Instructions:  ice 20 minutes every other hour as needed and mindful of good postures    Follow-up:   Return to care in one week

## 2020-12-16 ENCOUNTER — THERAPY VISIT (OUTPATIENT)
Dept: CHIROPRACTIC MEDICINE | Facility: CLINIC | Age: 60
End: 2020-12-16
Payer: COMMERCIAL

## 2020-12-16 DIAGNOSIS — M54.2 CERVICALGIA: ICD-10-CM

## 2020-12-16 DIAGNOSIS — M99.01 CERVICAL SEGMENT DYSFUNCTION: Primary | ICD-10-CM

## 2020-12-16 PROCEDURE — 98940 CHIROPRACT MANJ 1-2 REGIONS: CPT | Mod: AT | Performed by: CHIROPRACTOR

## 2020-12-16 PROCEDURE — 97810 ACUP 1/> WO ESTIM 1ST 15 MIN: CPT | Performed by: CHIROPRACTOR

## 2020-12-16 NOTE — PROGRESS NOTES
Visit #:  4 of 6, based on treatment plan    Subjective:  Becky Lay is a 60 year old female who is seen in f/u up for: presenting with chronic neck pain and headaches. Patient reports that the onset was July 2019 for unknown reasons.     Data Unavailable.     Since last visit on 12/9/20,  Becky Lay reports the following changes: Pain immediately after last treatment: 2/10 and their pain level today 1/10.  Patient reports a decrease in headaches and headache intensity since the last visit. States she was able to read and headaches are now mostly gone. States neck tension is improving.     Area of chief complaint:  Cervical :  Symptoms are graded at 1/10. The quality is described as stiff, achey, dull.  Motion has increased, but is still not normal, C1, C3. Patient feels that they are improved due to a reduction in symptoms.        Objective:  The following was observed:    P: palpatory tendernessSub-occipital and Traps Bilaterally    A: static palpation demonstrates intersegmental asymmetry , cervical    R: motion palpation notes restricted motion, Occiput, C1 , C3  and C6     T: The following soft tissue hypotonicities were observed:  Sub-occiput: bilateral, ache and stiff, referred pain: no      Assessment:    Segmental spinal dysfunction/restrictions found at:  Occiput  C1   C3   C6     Diagnoses:    No diagnosis found.    Patient's condition:  Patient had restrictions pre-manipulation    Treatment effectiveness:  Post manipulation there is better intersegmental movement and Patient claims to feel looser post manipulation      Procedures:  CMT:  50928 Chiropractic manipulative treatment 1-2 regions performed   Cervical: Diversified and Activator, C1 , C3 , Supine w/ STM to scalenes, suboccipitals      Modalities:  97488: Acupuncture, for 15 minutes:  Points: for neck pain, occipital HA's-added Li4, Celaya Siegel pt, GV pt-patient seated with pillow on lap    Therapeutic procedures:  None      Prognosis:  Good    Progress towards Goals: Patient is making great progress towards the goal     Response to Treatment:   Reduction in symptoms as reported by patient      Recommendations:    Instructions:  ice 20 minutes every other hour as needed and mindful of good postures    Follow-up:   Patient has met goals of treatment and will be discharged.

## 2020-12-18 ENCOUNTER — TRANSFERRED RECORDS (OUTPATIENT)
Dept: HEALTH INFORMATION MANAGEMENT | Facility: CLINIC | Age: 60
End: 2020-12-18

## 2020-12-19 DIAGNOSIS — N95.2 POST-MENOPAUSAL ATROPHIC VAGINITIS: ICD-10-CM

## 2020-12-21 RX ORDER — ESTRADIOL 0.1 MG/G
2 CREAM VAGINAL
Qty: 42.5 G | Refills: 3 | Status: SHIPPED | OUTPATIENT
Start: 2020-12-21 | End: 2021-01-11

## 2020-12-21 NOTE — TELEPHONE ENCOUNTER
I called and spoke to the pt and she is still taking this medication.   Jalyn Alejo on 12/21/2020 at 9:13 AM

## 2020-12-21 NOTE — TELEPHONE ENCOUNTER
Routing refill request to provider for review/approval because:  Medication is reported/historical  Patient has appointment in 3 weeks.    Lawanda Briceño RN Flex

## 2020-12-21 NOTE — TELEPHONE ENCOUNTER
Medication was discontinued over a year ago, please call patient to see if she is still taking this medication.    Toshia ATKINSON RN, BSN

## 2020-12-28 ENCOUNTER — TRANSFERRED RECORDS (OUTPATIENT)
Dept: HEALTH INFORMATION MANAGEMENT | Facility: CLINIC | Age: 60
End: 2020-12-28

## 2020-12-30 ENCOUNTER — TRANSFERRED RECORDS (OUTPATIENT)
Dept: HEALTH INFORMATION MANAGEMENT | Facility: CLINIC | Age: 60
End: 2020-12-30

## 2021-01-06 ASSESSMENT — ENCOUNTER SYMPTOMS
ARTHRALGIAS: 1
CONSTIPATION: 1
BREAST MASS: 0

## 2021-01-11 ENCOUNTER — OFFICE VISIT (OUTPATIENT)
Dept: PEDIATRICS | Facility: CLINIC | Age: 61
End: 2021-01-11
Payer: COMMERCIAL

## 2021-01-11 VITALS
SYSTOLIC BLOOD PRESSURE: 124 MMHG | HEIGHT: 65 IN | BODY MASS INDEX: 22.01 KG/M2 | RESPIRATION RATE: 12 BRPM | OXYGEN SATURATION: 99 % | HEART RATE: 79 BPM | WEIGHT: 132.1 LBS | DIASTOLIC BLOOD PRESSURE: 76 MMHG | TEMPERATURE: 97.2 F

## 2021-01-11 DIAGNOSIS — M15.9 GENERALIZED OSTEOARTHROSIS, INVOLVING MULTIPLE SITES: ICD-10-CM

## 2021-01-11 DIAGNOSIS — Z00.00 ROUTINE GENERAL MEDICAL EXAMINATION AT A HEALTH CARE FACILITY: Primary | ICD-10-CM

## 2021-01-11 DIAGNOSIS — N95.2 POST-MENOPAUSAL ATROPHIC VAGINITIS: ICD-10-CM

## 2021-01-11 LAB — DEPRECATED CALCIDIOL+CALCIFEROL SERPL-MC: 33 UG/L (ref 20–75)

## 2021-01-11 PROCEDURE — 80061 LIPID PANEL: CPT | Performed by: NURSE PRACTITIONER

## 2021-01-11 PROCEDURE — 99396 PREV VISIT EST AGE 40-64: CPT | Performed by: NURSE PRACTITIONER

## 2021-01-11 PROCEDURE — 82306 VITAMIN D 25 HYDROXY: CPT | Performed by: NURSE PRACTITIONER

## 2021-01-11 PROCEDURE — 36415 COLL VENOUS BLD VENIPUNCTURE: CPT | Performed by: NURSE PRACTITIONER

## 2021-01-11 RX ORDER — ESTRADIOL 0.1 MG/G
2 CREAM VAGINAL
Qty: 42.5 G | Refills: 3 | Status: SHIPPED | OUTPATIENT
Start: 2021-01-11 | End: 2021-12-27

## 2021-01-11 ASSESSMENT — MIFFLIN-ST. JEOR: SCORE: 1160.08

## 2021-01-11 ASSESSMENT — ENCOUNTER SYMPTOMS
CONSTIPATION: 1
ARTHRALGIAS: 1
BREAST MASS: 0

## 2021-01-11 NOTE — PROGRESS NOTES
SUBJECTIVE:   CC: Becky Lay is an 60 year old woman who presents for preventive health visit.     Patient has been advised of split billing requirements and indicates understanding: Yes  Healthy Habits:     Getting at least 3 servings of Calcium per day:  Yes    Bi-annual eye exam:  Yes    Dental care twice a year:  Yes    Sleep apnea or symptoms of sleep apnea:  None    Diet:  Regular (no restrictions)    Frequency of exercise:  None    Taking medications regularly:  Yes    Medication side effects:  Other    PHQ-2 Total Score: 0    Additional concerns today:  No  Concerns today: none    History of neck pain, was followed by pain, and doing PT, nortript, has had injections. She has had shoulder pain, had cortisone shot with some improvement.    Today's PHQ-2 Score:   PHQ-2 ( 1999 Pf[employer]) 1/6/2021   Q1: Little interest or pleasure in doing things 0   Q2: Feeling down, depressed or hopeless 0   PHQ-2 Score 0   Q1: Little interest or pleasure in doing things Not at all   Q2: Feeling down, depressed or hopeless Not at all   PHQ-2 Score 0     Abuse: Current or Past (Physical, Sexual or Emotional) - No  Do you feel safe in your environment? Yes    Social History     Tobacco Use     Smoking status: Never Smoker     Smokeless tobacco: Never Used   Substance Use Topics     Alcohol use: Not Currently     Alcohol/week: 10.0 standard drinks     Frequency: 2-4 times a month     Comment: one glass per week     Alcohol Use 1/6/2021   Prescreen: >3 drinks/day or >7 drinks/week? Not Applicable   Prescreen: >3 drinks/day or >7 drinks/week? -     Reviewed orders with patient.  Reviewed health maintenance and updated orders accordingly - Yes  Lab work is in process    Mammogram Screening: Patient over age 50, mutual decision to screen reflected in health maintenance.    Pertinent mammograms are reviewed under the imaging tab.  History of abnormal Pap smear: NO - age 30-65 PAP every 5 years with negative HPV co-testing  "recommended  PAP / HPV Latest Ref Rng & Units 9/17/2019 7/23/2014 8/18/2010   PAP - NIL NIL NIL   HPV 16 DNA NEG:Negative Negative - -   HPV 18 DNA NEG:Negative Negative - -   OTHER HR HPV NEG:Negative Negative - -     Reviewed and updated as needed this visit by clinical staff  Tobacco  Allergies  Meds   Med Hx  Surg Hx  Fam Hx  Soc Hx        Reviewed and updated as needed this visit by Provider    Meds                 Review of Systems   Gastrointestinal: Positive for constipation.   Breasts:  Negative for tenderness, breast mass and discharge.   Genitourinary: Negative for pelvic pain, vaginal bleeding and vaginal discharge.   Musculoskeletal: Positive for arthralgias.          OBJECTIVE:   /76 (BP Location: Right arm, Cuff Size: Adult Regular)   Pulse 79   Temp 97.2  F (36.2  C) (Tympanic)   Resp 12   Ht 1.651 m (5' 5\")   Wt 59.9 kg (132 lb 1.6 oz)   LMP 06/20/2008   SpO2 99%   BMI 21.98 kg/m    Physical Exam  GENERAL: healthy, alert and no distress  EYES: Eyes grossly normal to inspection, PERRL and conjunctivae and sclerae normal  HENT: ear canals and TM's normal, nose and mouth without ulcers or lesions  NECK: no adenopathy, no asymmetry, masses, or scars and thyroid normal to palpation  RESP: lungs clear to auscultation - no rales, rhonchi or wheezes  CV: regular rate and rhythm, normal S1 S2, no S3 or S4, no murmur, click or rub, no peripheral edema and peripheral pulses strong  MS: no gross musculoskeletal defects noted, no edema  PSYCH: mentation appears normal, affect normal/bright      ASSESSMENT/PLAN:   1. Routine general medical examination at a health care facility    - Lipid Profile (Chol, Trig, HDL, LDL calc)  - Vitamin D Deficiency    2. 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 Post-menopausal atrophic vaginitis    - estradiol (ESTRACE VAGINAL) 0.1 MG/GM vaginal cream; Place 2 g vaginally twice a week  Dispense: 42.5 g; Refill: 3    Patient has been advised of " "split billing requirements and indicates understanding: No  COUNSELING:  Reviewed preventive health counseling, as reflected in patient instructions  Special attention given to:        Regular exercise       Healthy diet/nutrition       Osteoporosis prevention/bone health    Estimated body mass index is 21.98 kg/m  as calculated from the following:    Height as of this encounter: 1.651 m (5' 5\").    Weight as of this encounter: 59.9 kg (132 lb 1.6 oz).        She reports that she has never smoked. She has never used smokeless tobacco.      Counseling Resources:  ATP IV Guidelines  Pooled Cohorts Equation Calculator  Breast Cancer Risk Calculator  BRCA-Related Cancer Risk Assessment: FHS-7 Tool  FRAX Risk Assessment  ICSI Preventive Guidelines  Dietary Guidelines for Americans, 2010  USDA's MyPlate  ASA Prophylaxis  Lung CA Screening    [facility]Y Soriano [facility]  "

## 2021-01-12 LAB
CHOLEST SERPL-MCNC: 222 MG/DL
HDLC SERPL-MCNC: 71 MG/DL
LDLC SERPL CALC-MCNC: 135 MG/DL
NONHDLC SERPL-MCNC: 151 MG/DL
TRIGL SERPL-MCNC: 82 MG/DL

## 2021-01-29 ENCOUNTER — TRANSFERRED RECORDS (OUTPATIENT)
Dept: HEALTH INFORMATION MANAGEMENT | Facility: CLINIC | Age: 61
End: 2021-01-29

## 2021-03-05 ENCOUNTER — VIRTUAL VISIT (OUTPATIENT)
Dept: PALLIATIVE MEDICINE | Facility: CLINIC | Age: 61
End: 2021-03-05
Payer: COMMERCIAL

## 2021-03-05 DIAGNOSIS — M79.18 MYOFASCIAL PAIN: ICD-10-CM

## 2021-03-05 DIAGNOSIS — M54.81 BILATERAL OCCIPITAL NEURALGIA: Primary | ICD-10-CM

## 2021-03-05 DIAGNOSIS — M47.812 FACET ARTHROPATHY, CERVICAL: ICD-10-CM

## 2021-03-05 PROCEDURE — 99214 OFFICE O/P EST MOD 30 MIN: CPT | Mod: GT | Performed by: PHYSICAL MEDICINE & REHABILITATION

## 2021-03-05 RX ORDER — PSYLLIUM HUSK/CALCIUM CARB 1 G-60 MG
CAPSULE ORAL
COMMUNITY
End: 2022-03-21

## 2021-03-05 ASSESSMENT — PAIN SCALES - GENERAL: PAINLEVEL: NO PAIN (1)

## 2021-03-05 NOTE — PROGRESS NOTES
Becky Montenegro is a 60 year old who is being evaluated via a billable video visit.      How would you like to obtain your AVS? MyChart  If the video visit is dropped, the invitation should be resent by: Text to cell phone: .  Will anyone else be joining your video visit? No    [phone]    Jennifer Castillo [facility] [place], [place]   Pain Management Center    The patient was last seen by NANCY Mora CNP on 11/30/2020    Recommendations at last visit:  1.  Pain Physical Therapy:    Completed. Encouraged Becky Montenegro continue to practice gentle stretches and exercises on a regular basis with extra self  care.    2.  Pain Psychologist to address relaxation, behavioral change, coping style, and other factors important to improvement.   NO   3.  Medication Management:     1. Since increasing no to 30mg, headaches have nearly been eliminated. Advised she continue nortriptyline 30mg at bedtime for now. After the first of the new year, we may consider adjusting dose to 25mg- this way she would only have to take one pill at bedtime and may find it just as effective.    2. Okay to take Tylenol and Flexeril as needed.    4.  Potential procedures: advised Becky Montenegro monitor need for trigger point injections, can repeat at any time.     5.  Referrals: continue sessions with Lashae Garcia DC for acupuncture (has three sessions scheduled in December)- monitor pain and relaxation benefit.    6.  Follow up with NANCY Henderson CNP in 4 weeks. If things are going quite well around that time, okay to postpone for another few weeks with Dr. Gross. Depending if pain/headaches remain stable, may be able to plan for only follow up as needed.       Additional provider notes:  - Pain has been well managed since last visit.   - She was seeing chiropractic and doing acupuncture which was very helpful. She had 4 visits total. Since stopping this pain has increased.   - Started at nortriptyline in September. Now at 30 mg daily. This has been  "helpful for pain but she has notices some side effects. She initially had some constipation but this has improved with taking metamucil. The other thing she has noticed is some hair loss. Unsure if this is related to the medicine. She does state that she has been having some hair loss for several years but has been more noticeable now.   - Overall pain has been ranging from 1-4/10 in severity.      Current pain medications:    Nortriptyline 30mg at bedtime- H, increased to this dose 10/27/2020, nearly eliminated headaches since this adjustment               Flexeril 5-10mg prn- [facility], taken for neck stiffness twice since last visit              Ibuprofen 400mg BID or TID- [facility],  hasn't taken since September              Tylenol 650mg prn - [facility], once since last visit     1. Previous Pain Relevant Medications:  NOTE: This medication information taken from patient's intake form, not medical records.               Opiates:  Vicodin- SE, nausea/vomiting, \"no I don't want it\"               NSAIDS: ibuprofen- [facility], naproxen- H              Muscle Relaxants: Flexeril- [facility] usually              Anti-migraine mediations: no              Anti-depressants: no              Sleep aids: no              Anxiolytics: no              Neuropathics: no                       Topicals: no              Other medications not covered above: Tylenol- ?, Prednisone- H     2. Physical Therapy: yes prior to surgery and after- [facility], practices occasionally, pain physical therapy- [facility]  3. Pain Psychology: yes Meg Hammer [facility]- [facility]  4. Surgery:  C5-6 discectomy and fusion with Dr. Jose at [facility] in March of 2003  5. Injections: repeat bilateral occipital nerve blocks (with steroid) with me on 8/25/2020- [facility]  bilateral TON, C3,4 medial branch blocks #1 with Dr. Gross on 8/10/2020- [facility], W for a few days  bilateral occipital nerve blocks and trigger point injections with me on 6/1/2020- [facility]/H for about 8 weeks, less so than previous " "occipital nerve blocks   trigger point injections with me on 1/19/2020- H for 2-3 months  bilateral occipital nerve block with Dr. Gross on 11/1/19- H!!!! 8-9 weeks  trigger point injections with me on 9/23/19- H for neck pain, continue to have some benefit  trigger point injections with me 8/23/19 - H for 2 weeks neck pain and headaches, 2.5 weeks tops  6. Chiropractic: no, \"people have always recommended it\"   7. Acupuncture: no  8. TENS Unit: no     Imaging:  MRI of cervical spine was completed on 10/29/19 and shows:           Assessment:   Becky Lay is a 60 year old female with a past medical history significant for hyperhidrosis who presents with complaints of neck pain.      1. Neck pain- etiology likely combination of facet arthropathy and myofascial pain, s/p C5-6 discectomy and fusion with Dr. Jose at [facility] in March of 2003.  2. Headaches- etiology likely combination of occipital neuralgia and tension headaches. Myofascial pain   3. Mental Health - the patient's mental health concerns, specifically situational depression, affect her experience of pain and contribute to her clinically significant distress.     Plan:     1.  Pain Physical Therapy:    Completed. Continue to practice gentle stretches and exercises on a regular basis with extra self care.    2.  Pain Psychologist to address relaxation, behavioral change, coping style, and other factors important to improvement.  NO   3.  Medication Management:     1. Since increasing Nortriptyline to 30mg, headaches have nearly been eliminated. Advised she continue nortriptyline 30mg at bedtime for now. She should pay attention to see if hair loss continues to be ongoing. If it is may need to consider tapering off of medication.     2. Okay to take Tylenol and Flexeril as needed.    4.  Potential procedures: May repeat TPI or ONB at anytime    5.  Referrals: She is going to work on finding new chiropractic and acupuncture locations.    6.  " Follow up with NANCY Henderson CNP in 2-3 months.      Video Start Time: 2:43 pm   Video-Visit Details    Type of service:  Video Visit    Video End Time: 3:06 am    Originating Location (pt. Location): Home    Distant Location (provider location):  [facility]IN MANAGEMENT [place]     Platform used for Video Visit: ID Watchdog     BILLING TIME DOCUMENTATION:   The total TIME spent on this patient on the date of the encounter/appointment was 30 minutes.      TOTAL TIME includes:   Time spent preparing to see the patient (reviewing records and tests)   Time spent face to face (or over the phone) with the patient   Time spent documenting clinical information in Epic

## 2021-03-05 NOTE — PATIENT INSTRUCTIONS
1. Work on finding new chiropractic and acupuncture locations.    ACUPUNCTURE OPTIONS (outpatient)    [facility]  ? [facility]  Scheduling:    ? Call the [facility] at [phone]  Insurance Coverage:    ? Please check with your insurance plan to determine available coverage and benefits covered by a Licensed Acupuncturist, including, but not limited to out of pocket costs, conditions covered and visit limits  ? HSA and FSA are also accepted.  Availability:  ? Acupuncture appointments are available Monday-Friday.  ? A provider referral is required for all patients to be seen at the [facility]  ? Employees with [employer] insurance do not need a referral.     Intranet page:  [email or web address]    ? [facility]- PEDIATRICS ONLY   Scheduling:  ? Call [facility] at [phone]   ? Schedule with Dr. Joy Groves. This is scheduled as part of an Integrative Medicine Consultation and is not scheduled as acupuncture-only at this time.     Insurance Coverage:  ? Please check with your insurance plan to determine available coverage and benefits covered by a physician, including, but not limited to out of pocket costs, conditions covered and visit limits  ? HSA and FSA are also accepted.  Availability:  ? Appointments are available Monday afternoons and Thursday mornings.  ? A provider referral may be required for  patients to be seen by Dr. Joy Groves depending on your insurance.    Intranet page:    Dr. Joy Groves - Integrative Medicine Consults    ? [facility]   Scheduling:    ? Only seeing staff at the [facility] at this time.    ? [facility]  Scheduling: Call [phone]  Insurance Coverage:    ? Please check with your insurance plan to determine available coverage and benefits  ? HSA and FSA are also accepted.  ? Cash pay available:  $124/session  Availability:  ? Acupuncture appointments are available Mondays and Wednesdays  ? Provider referral not required. Do not need to be a patient within Psychiatry to schedule.  ? [facility]  Scheduling: Call [phone]  Insurance Coverage:    ? Please check with your insurance plan to determine available coverage and benefits  ? HSA and FSA are also accepted  ? Cash pay available: $124/session  Availability:  ? Acupuncture appointments are available Tuesdays  ? Provider referral not required. Do not need to be a patient within Psychiatry to schedule.      Outside of [facility]:  While we don't have specific Acupuncturists names or clinics to share with you, we want to offer you some tips in finding an Acupuncturist.      ? In order to best find an Acupuncturist that is a fit for you, we recommend starting with this website:  [facility] - Find a practitioner  ? We recommend cross-referencing that list with your insurance plan to ensure you have coverage, if that is important to you, and with the list for the location that is a fit for you.   ? Additionally, we recommend that you ask a trusted friend, family member or colleague if they have utilized an Acupuncturist in your area that they would recommend.  However, keep in mind that just like finding a primary care physician or a , you simply may need to shop around until you find one that is a fit for you.     CHIROPRACTIC    [facility]  None at this time.    Outside of [facility]:  While we don't have specific Doctor of Chiropractic names or clinics to share with you, we want to offer you some tips in finding a Chiropractor.     ? We recommend seeking out a list from your insurance plan to ensure you have coverage with which providers, if that is important to you.    ? We recommend that you cross-reference the list for the location that is a fit for you.    ? Additionally, we  recommend that you ask a trusted friend, family member or colleague if they have utilized a chiropractor in your area that they would recommend.  However, keep in mind that just like finding a primary care physician or a , you simply may need to shop around until you find one that is a fit for you.  ? Other resources:  ? [place] [employer] Association Directory  ? International [employer]rop[employer]ic [employer] Association - Find a chiropractor  ? [place] [employer] Board of Chiropractic    2. Continue Nortriptyline 30 mg daily for now.   3. You can call anytime to have trigger point injections or occipital nerve blocks repeated.   4. Follow up with NANCY Mora CNP in 2-3 months for a virtual visit.     Domi Gross MD  [facility]     ----------------------------------------------------------------  Clinic Number:  [phone]     Call with any questions about your care and for scheduling assistance.     Calls are returned Monday through Friday between 8 AM and 4:30 PM. We usually get back to you within 2 business days depending on the issue/request.    If we are prescribing your medications:    For opioid medication refills, call the clinic or send a [employer] message 7 days in advance.  Please include:    Name of requested medication    Name of the pharmacy.    For non-opioid medications, call your pharmacy directly to request a refill. Please allow 3-4 days to be processed.     Per [place] State Law:    All controlled substance prescriptions must be filled within 30 days of being written.      For those controlled substances allowing refills, pickup must occur within 30 days of last fill.      We believe regular attendance is key to your success in our program!      Any time you are unable to keep your appointment we ask that you call us at least 24 hours in advance to cancel.This will allow us to offer the appointment time to another patient.     Multiple missed appointments may lead  to dismissal from the clinic.

## 2021-04-15 DIAGNOSIS — M54.81 BILATERAL OCCIPITAL NEURALGIA: ICD-10-CM

## 2021-04-15 NOTE — TELEPHONE ENCOUNTER
Received fax from pharmacy requesting refill(s) for nortriptyline (PAMELOR) 10 MG capsule     Last refilled on 03/15/21    Pt last seen on 03/05/21  Next appt scheduled for 04/16/21    E-prescribe to:  [id number] - [place], [place] - [address] AT [facility]     Will facilitate refill.      Meliza Elam MA  [facility] [place]

## 2021-04-19 RX ORDER — NORTRIPTYLINE HCL 10 MG
30 CAPSULE ORAL AT BEDTIME
Qty: 90 CAPSULE | Refills: 3 | Status: SHIPPED | OUTPATIENT
Start: 2021-04-19 | End: 2021-08-12

## 2021-04-19 NOTE — TELEPHONE ENCOUNTER
Signed Prescriptions:                        Disp   Refills    nortriptyline (PAMELOR) 10 MG capsule      90 cap*3        Sig: Take 3 capsules (30 mg) by mouth At Bedtime  Authorizing Provider: LITTLE TRUJILLO MD  [facility]

## 2021-04-23 ENCOUNTER — OFFICE VISIT (OUTPATIENT)
Dept: PALLIATIVE MEDICINE | Facility: CLINIC | Age: 61
End: 2021-04-23
Payer: COMMERCIAL

## 2021-04-23 VITALS — OXYGEN SATURATION: 97 % | SYSTOLIC BLOOD PRESSURE: 127 MMHG | DIASTOLIC BLOOD PRESSURE: 85 MMHG | HEART RATE: 89 BPM

## 2021-04-23 DIAGNOSIS — M79.18 MYOFASCIAL PAIN: Primary | ICD-10-CM

## 2021-04-23 PROCEDURE — 20553 NJX 1/MLT TRIGGER POINTS 3/>: CPT | Performed by: PHYSICAL MEDICINE & REHABILITATION

## 2021-04-23 RX ORDER — BUPIVACAINE HYDROCHLORIDE 5 MG/ML
4 INJECTION, SOLUTION EPIDURAL; INTRACAUDAL ONCE
Status: COMPLETED | OUTPATIENT
Start: 2021-04-23 | End: 2021-04-23

## 2021-04-23 RX ADMIN — BUPIVACAINE HYDROCHLORIDE 20 MG: 5 INJECTION, SOLUTION EPIDURAL; INTRACAUDAL at 15:31

## 2021-04-23 ASSESSMENT — PAIN SCALES - GENERAL: PAINLEVEL: MODERATE PAIN (4)

## 2021-04-23 NOTE — PROGRESS NOTES
[facility] - Procedure Note    Date of Visit: 4/23/2021    Pre procedure Diagnosis: myofascial pain  Post procedure Diagnosis: Same  Procedure performed: trigger point injections  Complications: none  Operators: MD [facility] Garcia [facility]     /85   Pulse 89   LMP 06/20/2008   SpO2 97%     Indications:   Becky Lay is a 59 year old female with a history of neck/shoulder pain.  Exam shows myofascial pain of the muscle groups listed below and they have tried conservative treatment including PT and medications.    Options/alternatives, benefits and risks were discussed with the patient including bleeding, infection, tissue trauma and pnuemothorax.  Questions were answered to her satisfaction and she agrees to proceed. Voluntary informed consent was obtained and signed.     Vitals allergies and medications were reviewed.    This is a repeat injection. Last TPI was on 6/1/2020 which provided multiple months of benefit.     Procedure:  After getting informed consent, a Pause for the Cause was performed.    Trigger points were identified by patient, and marked when appropriate.  The area was prepped with Chloroprep.    Using clean technique, injections were completed using a 25G, 1.5 inch needle.  After negative aspiration, injection was completed.  A total of 6 locations were injected.  When possible, tissue was retracted from the chest wall to avoid lung injury.    Muscle groups injected:  Bilateral suboccipital muscles x 2 sites  Bilateral cervical paraspinals x 4 sites  Right trapezius x 1 site    Injection solution contained:  4 ml of 0.5% bupivacaine and 4ml of 1% lidocaine.     Hemostasis was achieved, the area was cleaned, and bandaids were placed when appropriate.  The patient tolerated the procedure well.    Follow-up includes:   - f/u with NANCY Mora CNP in 2 months.       MD [facility]ATIANA Garcia [facility]

## 2021-04-23 NOTE — PATIENT INSTRUCTIONS
[facility]   Post Procedure Instructions    Today you had:  trigger point injections      Medications used:  lidocaine   bupivicaine   kenolog         Go to the emergency room if you develop any shortness of breath    Monitor the injection sites for signs and symptoms of infection-fever, chills, redness, swelling, warmth, or drainage to areas.    You may have soreness at injection sites for up to 24 hours.    You may apply ice to the painful areas to help minimize the discomfort of the needle pokes.    Do not apply heat to sites for at least 12 hours.    You may use anti-inflammatory medications or Tylenol for pain control if necessary    Pain Clinic phone number during work hours Monday-Friday:  [phone]    After hours provider line: [phone]

## 2021-08-12 DIAGNOSIS — M54.81 BILATERAL OCCIPITAL NEURALGIA: ICD-10-CM

## 2021-08-12 RX ORDER — NORTRIPTYLINE HCL 10 MG
30 CAPSULE ORAL AT BEDTIME
Qty: 90 CAPSULE | Refills: 3 | Status: SHIPPED | OUTPATIENT
Start: 2021-08-12 | End: 2021-12-15

## 2021-08-12 NOTE — TELEPHONE ENCOUNTER
Signed Prescriptions:                        Disp   Refills    nortriptyline (PAMELOR) 10 MG capsule      90 cap*3        Sig: Take 3 capsules (30 mg) by mouth At Bedtime  Authorizing Provider: BO BROWER recommend follow up in the next month or so.    NANCY Henderson [facility]

## 2021-08-12 NOTE — TELEPHONE ENCOUNTER
Received fax request from:  [id number]  [address] [place] [address]  Phone: [phone] Fax: [phone]    Pharmacy requesting refill(s) for nortriptyline (PAMELOR) 10 MG capsule    Last refilled on 07/11/21    Pt last seen on 04/23/21 (Dr. Gross)    Next appt scheduled for None    Will facilitate refill.    Gloria Titus [facility] [place]

## 2021-10-24 ENCOUNTER — HEALTH MAINTENANCE LETTER (OUTPATIENT)
Age: 61
End: 2021-10-24

## 2021-12-14 ENCOUNTER — TELEPHONE (OUTPATIENT)
Dept: PALLIATIVE MEDICINE | Facility: CLINIC | Age: 61
End: 2021-12-14
Payer: COMMERCIAL

## 2021-12-14 DIAGNOSIS — M54.81 BILATERAL OCCIPITAL NEURALGIA: ICD-10-CM

## 2021-12-14 RX ORDER — NORTRIPTYLINE HCL 10 MG
30 CAPSULE ORAL AT BEDTIME
Qty: 90 CAPSULE | Refills: 3 | Status: CANCELLED | OUTPATIENT
Start: 2021-12-14

## 2021-12-14 NOTE — TELEPHONE ENCOUNTER
Reason for call:  Medication   If this is a refill request, has the caller requested the refill from the pharmacy already? No  Will the patient be using a [facility]? No  Name of the pharmacy and phone number for the current request: [id number] - [place], [place] - [address] AT [facility]    Name of the medication requested: nortriptyline (PAMELOR) 10 MG capsule    Phone number to reach patient:  Home number on file [phone] (home)    Can we leave a detailed message on this number?  YES    Travel screening: Not Applicable         Isabel SIMPSON    [facility] [facility]

## 2021-12-14 NOTE — TELEPHONE ENCOUNTER
Chief Complaint   Patient presents with     Opioid Refill     nortriptyline (PAMELOR) 10 MG capsule      Refill request received via phone by patient or caregiver   [id number] - [place], [place] - [address] AT [facility]       Pending Prescriptions:                       Disp   Refills    nortriptyline (PAMELOR) 10 MG capsule     90 cap*3            Sig: Take 3 capsules (30 mg) by mouth At Bedtime         Last Written Prescription Date:  8/12/21  Last Fill Quantity: 90,   # refills: 3  Last Office Visit with prescribing provider: 4/23/21  Future Office visit: none

## 2021-12-14 NOTE — TELEPHONE ENCOUNTER
"Routing to provider as BALBINA, ok to close when read    Advised pt that given the length of time between OV and the recc at last refill 8/21 that she should be seen clinically/have appt scheduled so we can work on refill.    Pt states she \"is doing really well and doesn't think that [she] needs to be seen anymore at the office\".     Expressed that we will cancel refill request and that pt can fill through PCP moving forward. Pt inquired about process regarding returning to clinic if necessary and this was explained to her as well.    Refill request received via phone by patient or caregiver   [id number] - [place], [place] - [address] AT [facility]       Pending Prescriptions:                       Disp   Refills    nortriptyline (PAMELOR) 10 MG capsule     90 cap*3    Sig: Take 3 capsules (30 mg) by mouth At Bedtime         Last Written Prescription Date:  8/12/21  Last Fill Quantity: 90,   # refills: 3  Last Office Visit with prescribing provider: 4/23/21  Future Office visit: none      Vaishali GALAN RN Care Coordinator  [facility]          "

## 2021-12-14 NOTE — TELEPHONE ENCOUNTER
Thank you, information noted.      NANCY Henderson [facility]

## 2021-12-15 DIAGNOSIS — M54.81 BILATERAL OCCIPITAL NEURALGIA: ICD-10-CM

## 2021-12-15 RX ORDER — NORTRIPTYLINE HCL 10 MG
30 CAPSULE ORAL AT BEDTIME
Qty: 90 CAPSULE | Refills: 3 | Status: SHIPPED | OUTPATIENT
Start: 2021-12-15 | End: 2022-03-21

## 2021-12-15 NOTE — TELEPHONE ENCOUNTER
She is looking for refill of nortriptyline. She has not been back to [facility] pain clinic because the nortiptyline works so well for her. Angelita Dash RN on 12/15/2021 at 9:46 AM

## 2021-12-24 DIAGNOSIS — N95.2 POST-MENOPAUSAL ATROPHIC VAGINITIS: ICD-10-CM

## 2021-12-27 RX ORDER — ESTRADIOL 0.1 MG/G
CREAM VAGINAL
Qty: 42.5 G | Refills: 0 | Status: SHIPPED | OUTPATIENT
Start: 2021-12-27 | End: 2022-03-21

## 2021-12-28 ENCOUNTER — TRANSFERRED RECORDS (OUTPATIENT)
Dept: HEALTH INFORMATION MANAGEMENT | Facility: CLINIC | Age: 61
End: 2021-12-28
Payer: COMMERCIAL

## 2021-12-30 ENCOUNTER — ANCILLARY PROCEDURE (OUTPATIENT)
Dept: MAMMOGRAPHY | Facility: CLINIC | Age: 61
End: 2021-12-30
Attending: NURSE PRACTITIONER
Payer: COMMERCIAL

## 2021-12-30 DIAGNOSIS — Z12.31 VISIT FOR SCREENING MAMMOGRAM: ICD-10-CM

## 2021-12-30 PROCEDURE — 77067 SCR MAMMO BI INCL CAD: CPT | Mod: TC | Performed by: RADIOLOGY

## 2022-02-13 ENCOUNTER — HEALTH MAINTENANCE LETTER (OUTPATIENT)
Age: 62
End: 2022-02-13

## 2022-03-18 SDOH — ECONOMIC STABILITY: INCOME INSECURITY: HOW HARD IS IT FOR YOU TO PAY FOR THE VERY BASICS LIKE FOOD, HOUSING, MEDICAL CARE, AND HEATING?: NOT HARD AT ALL

## 2022-03-18 SDOH — HEALTH STABILITY: PHYSICAL HEALTH: ON AVERAGE, HOW MANY MINUTES DO YOU ENGAGE IN EXERCISE AT THIS LEVEL?: 40 MIN

## 2022-03-18 SDOH — ECONOMIC STABILITY: TRANSPORTATION INSECURITY
IN THE PAST 12 MONTHS, HAS THE LACK OF TRANSPORTATION KEPT YOU FROM MEDICAL APPOINTMENTS OR FROM GETTING MEDICATIONS?: NO

## 2022-03-18 SDOH — ECONOMIC STABILITY: TRANSPORTATION INSECURITY
IN THE PAST 12 MONTHS, HAS LACK OF TRANSPORTATION KEPT YOU FROM MEETINGS, WORK, OR FROM GETTING THINGS NEEDED FOR DAILY LIVING?: NO

## 2022-03-18 SDOH — ECONOMIC STABILITY: FOOD INSECURITY: WITHIN THE PAST 12 MONTHS, THE FOOD YOU BOUGHT JUST DIDN'T LAST AND YOU DIDN'T HAVE MONEY TO GET MORE.: NEVER TRUE

## 2022-03-18 SDOH — HEALTH STABILITY: PHYSICAL HEALTH: ON AVERAGE, HOW MANY DAYS PER WEEK DO YOU ENGAGE IN MODERATE TO STRENUOUS EXERCISE (LIKE A BRISK WALK)?: 3 DAYS

## 2022-03-18 SDOH — ECONOMIC STABILITY: FOOD INSECURITY: WITHIN THE PAST 12 MONTHS, YOU WORRIED THAT YOUR FOOD WOULD RUN OUT BEFORE YOU GOT MONEY TO BUY MORE.: NEVER TRUE

## 2022-03-18 SDOH — ECONOMIC STABILITY: INCOME INSECURITY: IN THE LAST 12 MONTHS, WAS THERE A TIME WHEN YOU WERE NOT ABLE TO PAY THE MORTGAGE OR RENT ON TIME?: NO

## 2022-03-18 ASSESSMENT — ENCOUNTER SYMPTOMS
MYALGIAS: 0
NAUSEA: 0
SORE THROAT: 0
ARTHRALGIAS: 0
COUGH: 0
FEVER: 0
EYE PAIN: 0
CONSTIPATION: 0
HEARTBURN: 0
SHORTNESS OF BREATH: 0
ABDOMINAL PAIN: 0
FREQUENCY: 0
PARESTHESIAS: 0
HEADACHES: 0
BREAST MASS: 0
NERVOUS/ANXIOUS: 0
DIZZINESS: 0
DIARRHEA: 0
HEMATOCHEZIA: 0
JOINT SWELLING: 0
PALPITATIONS: 0
DYSURIA: 0
WEAKNESS: 0
CHILLS: 0
HEMATURIA: 0

## 2022-03-18 ASSESSMENT — SOCIAL DETERMINANTS OF HEALTH (SDOH)
HOW OFTEN DO YOU ATTEND CHURCH OR RELIGIOUS SERVICES?: MORE THAN 4 TIMES PER YEAR
IN A TYPICAL WEEK, HOW MANY TIMES DO YOU TALK ON THE PHONE WITH FAMILY, FRIENDS, OR NEIGHBORS?: MORE THAN THREE TIMES A WEEK
HOW OFTEN DO YOU GET TOGETHER WITH FRIENDS OR RELATIVES?: THREE TIMES A WEEK
DO YOU BELONG TO ANY CLUBS OR ORGANIZATIONS SUCH AS CHURCH GROUPS UNIONS, FRATERNAL OR ATHLETIC GROUPS, OR SCHOOL GROUPS?: YES

## 2022-03-18 ASSESSMENT — LIFESTYLE VARIABLES
HOW OFTEN DO YOU HAVE A DRINK CONTAINING ALCOHOL: NEVER
HOW OFTEN DO YOU HAVE SIX OR MORE DRINKS ON ONE OCCASION: NEVER
HOW MANY STANDARD DRINKS CONTAINING ALCOHOL DO YOU HAVE ON A TYPICAL DAY: PATIENT DECLINED

## 2022-03-21 ENCOUNTER — OFFICE VISIT (OUTPATIENT)
Dept: PEDIATRICS | Facility: CLINIC | Age: 62
End: 2022-03-21
Payer: COMMERCIAL

## 2022-03-21 VITALS
WEIGHT: 130.6 LBS | SYSTOLIC BLOOD PRESSURE: 108 MMHG | OXYGEN SATURATION: 99 % | HEIGHT: 65 IN | DIASTOLIC BLOOD PRESSURE: 72 MMHG | TEMPERATURE: 97.2 F | BODY MASS INDEX: 21.76 KG/M2 | HEART RATE: 78 BPM | RESPIRATION RATE: 14 BRPM

## 2022-03-21 DIAGNOSIS — M54.81 BILATERAL OCCIPITAL NEURALGIA: ICD-10-CM

## 2022-03-21 DIAGNOSIS — M54.2 CERVICALGIA: ICD-10-CM

## 2022-03-21 DIAGNOSIS — Z00.00 ROUTINE GENERAL MEDICAL EXAMINATION AT A HEALTH CARE FACILITY: Primary | ICD-10-CM

## 2022-03-21 DIAGNOSIS — N95.2 POST-MENOPAUSAL ATROPHIC VAGINITIS: ICD-10-CM

## 2022-03-21 LAB
FASTING STATUS PATIENT QL REPORTED: NO
GLUCOSE BLD-MCNC: 102 MG/DL (ref 70–99)
TSH SERPL DL<=0.005 MIU/L-ACNC: 3.17 MU/L (ref 0.4–4)

## 2022-03-21 PROCEDURE — 84443 ASSAY THYROID STIM HORMONE: CPT | Performed by: NURSE PRACTITIONER

## 2022-03-21 PROCEDURE — 82947 ASSAY GLUCOSE BLOOD QUANT: CPT | Performed by: NURSE PRACTITIONER

## 2022-03-21 PROCEDURE — 99396 PREV VISIT EST AGE 40-64: CPT | Performed by: NURSE PRACTITIONER

## 2022-03-21 PROCEDURE — 99213 OFFICE O/P EST LOW 20 MIN: CPT | Mod: 25 | Performed by: NURSE PRACTITIONER

## 2022-03-21 PROCEDURE — 36415 COLL VENOUS BLD VENIPUNCTURE: CPT | Performed by: NURSE PRACTITIONER

## 2022-03-21 RX ORDER — ESTRADIOL 0.1 MG/G
CREAM VAGINAL
Qty: 42.5 G | Status: SHIPPED | OUTPATIENT
Start: 2022-03-21 | End: 2023-05-05

## 2022-03-21 RX ORDER — NORTRIPTYLINE HCL 10 MG
30 CAPSULE ORAL AT BEDTIME
Qty: 270 CAPSULE | Status: SHIPPED | OUTPATIENT
Start: 2022-03-21 | End: 2022-06-23 | Stop reason: DRUGHIGH

## 2022-03-21 ASSESSMENT — ENCOUNTER SYMPTOMS
DYSURIA: 0
COUGH: 0
HEARTBURN: 0
MYALGIAS: 0
ARTHRALGIAS: 0
CHILLS: 0
DIARRHEA: 0
HEADACHES: 0
WEAKNESS: 0
ABDOMINAL PAIN: 0
HEMATURIA: 0
HEMATOCHEZIA: 0
DIZZINESS: 0
FREQUENCY: 0
SORE THROAT: 0
CONSTIPATION: 0
BREAST MASS: 0
NAUSEA: 0
FEVER: 0
EYE PAIN: 0
NERVOUS/ANXIOUS: 0
JOINT SWELLING: 0
PALPITATIONS: 0
PARESTHESIAS: 0
SHORTNESS OF BREATH: 0

## 2022-03-21 NOTE — PROGRESS NOTES
SUBJECTIVE:   CC: Becky Lay is an 61 year old woman who presents for preventive health visit.     Patient has been advised of split billing requirements and indicates understanding: Yes  Healthy Habits:     Getting at least 3 servings of Calcium per day:  Yes    Bi-annual eye exam:  Yes    Dental care twice a year:  Yes    Sleep apnea or symptoms of sleep apnea:  None    Diet:  Regular (no restrictions)    Frequency of exercise:  2-3 days/week    Duration of exercise:  30-45 minutes    Taking medications regularly:  Yes    PHQ-2 Total Score: 0    Additional concerns today:  No    Concerns today: none  NOT fasting    History of headache pain and neck pain, is seeing chiro and doing accupuncture. Used to see pain clinic, no longer. Is on nortriptyline.  Wondering about decreasing dose. Gets breakthrough ain every 6 wks and goes to chiro with relief.     Today's PHQ-2 Score:   PHQ-2 ( 1999 Pfizer) 3/18/2022   Q1: Little interest or pleasure in doing things 0   Q2: Feeling down, depressed or hopeless 0   PHQ-2 Score 0   PHQ-2 Total Score (12-17 Years)- Positive if 3 or more points; Administer PHQ-A if positive -   Q1: Little interest or pleasure in doing things Not at all   Q2: Feeling down, depressed or hopeless Not at all   PHQ-2 Score 0     Abuse: Current or Past (Physical, Sexual or Emotional) - No  Do you feel safe in your environment? Yes    Social History     Tobacco Use     Smoking status: Never Smoker     Smokeless tobacco: Never Used   Substance Use Topics     Alcohol use: Not Currently     Alcohol/week: 10.0 standard drinks     Comment: one glass per week     Alcohol Use 3/18/2022   Prescreen: >3 drinks/day or >7 drinks/week? Not Applicable   Prescreen: >3 drinks/day or >7 drinks/week? -     Reviewed orders with patient.  Reviewed health maintenance and updated orders accordingly - Yes  Lab work is in process    Breast Cancer Screening:    Breast CA Risk Assessment (FHS-7) 3/18/2022   Do you have a  "family history of breast, colon, or ovarian cancer? No / Unknown         Mammogram Screening: Recommended mammography every 1-2 years with patient discussion and risk factor consideration  Pertinent mammograms are reviewed under the imaging tab.    History of abnormal Pap smear: NO - age 30-65 PAP every 5 years with negative HPV co-testing recommended  PAP / HPV Latest Ref Rng & Units 9/17/2019 7/23/2014 8/18/2010   PAP (Historical) - NIL NIL NIL   HPV16 NEG:Negative Negative - -   HPV18 NEG:Negative Negative - -   HRHPV NEG:Negative Negative - -     Reviewed and updated as needed this visit by clinical staff   Tobacco  Allergies  Meds   Med Hx  Surg Hx  Fam Hx  Soc Hx        Reviewed and updated as needed this visit by Provider     Meds                 Review of Systems   Constitutional: Negative for chills and fever.   HENT: Negative for congestion, ear pain, hearing loss and sore throat.    Eyes: Negative for pain and visual disturbance.   Respiratory: Negative for cough and shortness of breath.    Cardiovascular: Negative for chest pain, palpitations and peripheral edema.   Gastrointestinal: Negative for abdominal pain, constipation, diarrhea, heartburn, hematochezia and nausea.   Breasts:  Negative for tenderness, breast mass and discharge.   Genitourinary: Negative for dysuria, frequency, genital sores, hematuria, pelvic pain, urgency, vaginal bleeding and vaginal discharge.   Musculoskeletal: Negative for arthralgias, joint swelling and myalgias.   Skin: Negative for rash.   Neurological: Negative for dizziness, weakness, headaches and paresthesias.   Psychiatric/Behavioral: Negative for mood changes. The patient is not nervous/anxious.         OBJECTIVE:   /72 (BP Location: Right arm, Cuff Size: Adult Regular)   Pulse 78   Temp 97.2  F (36.2  C) (Tympanic)   Resp 14   Ht 1.654 m (5' 5.1\")   Wt 59.2 kg (130 lb 9.6 oz)   LMP 06/20/2008   SpO2 99%   BMI 21.67 kg/m    Physical Exam  GENERAL: " "healthy, alert and no distress  EYES: Eyes grossly normal to inspection, PERRL and conjunctivae and sclerae normal  HENT: ear canals and TM's normal, nose and mouth without ulcers or lesions  NECK: no adenopathy, no asymmetry, masses, or scars and thyroid normal to palpation  RESP: lungs clear to auscultation - no rales, rhonchi or wheezes  CV: regular rate and rhythm, normal S1 S2, no S3 or S4, no murmur, click or rub, no peripheral edema and peripheral pulses strong  PSYCH: mentation appears normal, affect normal/bright      ASSESSMENT/PLAN:   (Z00.00) Routine general medical examination at a health care facility  (primary encounter diagnosis)  Comment:   Plan: GLUCOSE, TSH with free T4 reflex          (M54.2) Cervicalgia  Comment: doing well on current regime  Plan:     (M54.81) Bilateral occipital neuralgia  Comment: stable on current regime. No changes made  Plan: nortriptyline (PAMELOR) 10 MG capsule          (N95.2) Post-menopausal atrophic vaginitis  Comment: stable, uses estrace PRN  Plan: estradiol (ESTRACE) 0.1 MG/GM vaginal cream            COUNSELING:  Reviewed preventive health counseling, as reflected in patient instructions  Special attention given to:        Regular exercise       Healthy diet/nutrition       Osteoporosis prevention/bone health    Estimated body mass index is 21.67 kg/m  as calculated from the following:    Height as of this encounter: 1.654 m (5' 5.1\").    Weight as of this encounter: 59.2 kg (130 lb 9.6 oz).        She reports that she has never smoked. She has never used smokeless tobacco.      Counseling Resources:  ATP IV Guidelines  Pooled Cohorts Equation Calculator  Breast Cancer Risk Calculator  BRCA-Related Cancer Risk Assessment: FHS-7 Tool  FRAX Risk Assessment  ICSI Preventive Guidelines  Dietary Guidelines for Americans, 2010  USDA's MyPlate  ASA Prophylaxis  Lung CA Screening    Paty Mason, APRN [facility]  [facility] [place]  "

## 2022-04-25 ENCOUNTER — MYC MEDICAL ADVICE (OUTPATIENT)
Dept: PEDIATRICS | Facility: CLINIC | Age: 62
End: 2022-04-25
Payer: COMMERCIAL

## 2022-04-25 DIAGNOSIS — G44.209 TENSION HEADACHE: Primary | ICD-10-CM

## 2022-04-26 ENCOUNTER — TELEPHONE (OUTPATIENT)
Dept: PALLIATIVE MEDICINE | Facility: CLINIC | Age: 62
End: 2022-04-26
Payer: COMMERCIAL

## 2022-04-26 NOTE — LETTER
April 26, 2022    Becky Lay  [address]  [place] [address]    Dear Harsha Pena to the [facility] at the [facility]. We are located at [address], [address], [place] [address]. Your appointment has been scheduled on 5/12/22 at 9:00a with Jacquelyn Nj NP.    At your first visit, you will meet your team of caregivers who will help you to develop pain management strategies that will last a lifetime. You will meet with our support staff to review your insurance information and collect your co-payment if required by your insurance company. You will meet with a medical pain specialist and care coordinator who will assess your pain and develop a plan of care for your successful pain rehabilitation. You should expect to spend approximately 1 hour at your first visit with us. Usually, patients work with us for a period of 6-12 months, and eventually return to their primary doctor once their pain management has stabilized.      To help us make your visit go as smoothly as possible, please bring the following items with you on your visit:     Completed Pain Questionnaire enclosed in this packet.  If you do not bring the completed questionnaire, we may have to reschedule your appointment.    List of any medicines that you are currently taking or have been prescribed    Pertinent NON-[facility] medical information such as medical records or tests results (X-rays, or laboratory tests)    Your health insurance card    Financial resources to cover your co-payment or balance due at the time of service (cash, personal check, Visa, and MasterCard are acceptable methods of payment)     Due to the high demand for new patient evaluations, you must notify the scheduling department 48 hours (2 days) in advance if you are not able to keep this appointment.  Failure to do so could affect your ability to reschedule with our clinic. Please do  not assume that you will receive any prescription medications at your first visit.    Please call [phone] with any questions regarding your appointment. We look forward to meeting you and working to address your health care needs.     Sincerely,    [facility]

## 2022-05-03 ENCOUNTER — HOSPITAL ENCOUNTER (EMERGENCY)
Facility: CLINIC | Age: 62
Discharge: HOME | End: 2022-05-03
Attending: EMERGENCY MEDICINE | Admitting: EMERGENCY MEDICINE
Payer: COMMERCIAL

## 2022-05-03 VITALS
HEART RATE: 92 BPM | OXYGEN SATURATION: 97 % | TEMPERATURE: 98 F | DIASTOLIC BLOOD PRESSURE: 92 MMHG | SYSTOLIC BLOOD PRESSURE: 147 MMHG | RESPIRATION RATE: 20 BRPM

## 2022-05-03 DIAGNOSIS — G43.809 OTHER MIGRAINE WITHOUT STATUS MIGRAINOSUS, NOT INTRACTABLE: ICD-10-CM

## 2022-05-03 PROCEDURE — 96375 TX/PRO/DX INJ NEW DRUG ADDON: CPT

## 2022-05-03 PROCEDURE — 96374 THER/PROPH/DIAG INJ IV PUSH: CPT

## 2022-05-03 PROCEDURE — 96361 HYDRATE IV INFUSION ADD-ON: CPT

## 2022-05-03 PROCEDURE — 250N000011 HC RX IP 250 OP 636: Performed by: EMERGENCY MEDICINE

## 2022-05-03 PROCEDURE — 99284 EMERGENCY DEPT VISIT MOD MDM: CPT | Mod: 25

## 2022-05-03 PROCEDURE — 258N000003 HC RX IP 258 OP 636: Performed by: EMERGENCY MEDICINE

## 2022-05-03 RX ORDER — SODIUM CHLORIDE 9 MG/ML
INJECTION, SOLUTION INTRAVENOUS CONTINUOUS
Status: DISCONTINUED | OUTPATIENT
Start: 2022-05-03 | End: 2022-05-03 | Stop reason: HOSPADM

## 2022-05-03 RX ORDER — DEXAMETHASONE SODIUM PHOSPHATE 10 MG/ML
10 INJECTION, SOLUTION INTRAMUSCULAR; INTRAVENOUS ONCE
Status: COMPLETED | OUTPATIENT
Start: 2022-05-03 | End: 2022-05-03

## 2022-05-03 RX ORDER — DIPHENHYDRAMINE HYDROCHLORIDE 50 MG/ML
25 INJECTION INTRAMUSCULAR; INTRAVENOUS ONCE
Status: COMPLETED | OUTPATIENT
Start: 2022-05-03 | End: 2022-05-03

## 2022-05-03 RX ADMIN — DEXAMETHASONE SODIUM PHOSPHATE 10 MG: 10 INJECTION, SOLUTION INTRAMUSCULAR; INTRAVENOUS at 19:25

## 2022-05-03 RX ADMIN — PROCHLORPERAZINE EDISYLATE 10 MG: 5 INJECTION INTRAMUSCULAR; INTRAVENOUS at 19:24

## 2022-05-03 RX ADMIN — DIPHENHYDRAMINE HYDROCHLORIDE 25 MG: 50 INJECTION, SOLUTION INTRAMUSCULAR; INTRAVENOUS at 19:25

## 2022-05-03 RX ADMIN — SODIUM CHLORIDE 1000 ML: 9 INJECTION, SOLUTION INTRAVENOUS at 19:24

## 2022-05-03 ASSESSMENT — ENCOUNTER SYMPTOMS
COUGH: 0
HEADACHES: 1
CONFUSION: 0
FEVER: 0
SPEECH DIFFICULTY: 0

## 2022-05-03 NOTE — ED PROVIDER NOTES
History   Chief Complaint:  Migraine     The history is provided by the patient.      Becky Lay is a 61 year old female with history of cancer who presents with headache that begins in the occipital region and radiates over to the frontal area and her face. She states this headache began on the 24th of April, and has worsened over time. She notes that this is similar to other headaches she has experienced in the past, and has been seen four times in the ED for similar headaches. Her last imaging and ED visit was around September 2020. She denies fever, vision changes, speech difficulty, confusion, or cough.    Review of Systems   Constitutional: Negative for fever.   Eyes: Negative for visual disturbance.   Respiratory: Negative for cough.    Neurological: Positive for headaches. Negative for speech difficulty.   Psychiatric/Behavioral: Negative for confusion.   All other systems reviewed and are negative.    Allergies:  The patient has no known allergies.     Medications:  Pamelor    Past Medical History:     DJD  Cervicalgia  Skin cancer      Past Surgical History:    Anterior C-5, 6 fusion     Family History:    Osteoporosis  Hyperlipidemia  Dementia  Heart disease  MI    Social History:  The patient presented to the ED with her .  The patient arrived to the ED via personal vehicle.    Physical Exam     Patient Vitals for the past 24 hrs:   BP Temp Temp src Pulse Resp SpO2   05/03/22 2015 (!) 147/92 -- -- 92 -- 97 %   05/03/22 1930 -- -- -- -- -- 100 %   05/03/22 1457 (!) 171/100 98  F (36.7  C) Temporal 87 20 100 %     Physical Exam  VS: Reviewed per above  HENT: Mucous membranes moist, no nuchal rigidity  EYES: sclera anicteric  CV: Rate as noted, regular rhythm.   RESP: Effort normal. Breath sounds are normal bilaterally.  NEURO: GCS 15, cranial nerves II through XII are intact, 5 out of 5 strength in all 4 extremities, sensation is intact light touch in all 4 extremities  MSK: No deformity of  the extremities  SKIN: Warm and dry    Emergency Department Course     Reviewed:  I reviewed nursing notes, vitals and past medical history    Assessments:  1900 I obtained history and examined the patient as noted above.   2005 I rechecked the patient and explained findings.     Interventions:  1924 NS 1 L IV   Compazine 10 mg IV  1925 Benadryl 25 mg IV   Decadron 10 mg IV    Disposition:  The patient was discharged to home.     Impression & Plan     Medical Decision Making:  Becky Lay is a 61 year old female who presents with a headache consistent with her typical migraine.  Evaluation in the emergency department has been negative. The patient has not had any fever, weakness, numbness, paresthesias, neck stiffness, seizures, vision changes or confusion. The headache was not worst at onset, worst headache of life, nor thunderclap in nature. Meningitis, pseudotumor, subarachnoid hemorrhage, CNS tumor, venous thrombosis and stroke are considered as part of the differential, and considered unlikely. There has been no trauma to suggest intracranial hemorrhage. I do not believe imaging is indicated at this time.  Her pain has improved with medication interventions.  The patient should follow-up with her primary physician.  I recommended she return for worse pain, fever, vomiting, weakness, or any other concerns.        Diagnosis:    ICD-10-CM    1. Other migraine without status migrainosus, not intractable  G43.809      Scribe Disclosure:  I, Riki Mascorro, am serving as a scribe at 6:42 PM on 5/3/2022 to document services personally performed by Emanuel Pardo MD based on my observations and the provider's statements to me.            Emanuel Pardo MD  05/04/22 0021

## 2022-05-03 NOTE — ED TRIAGE NOTES
Cervicogenic headaches. Hx of this. Feels the same as in the past. Migraine cocktail usually helps patient.

## 2022-05-03 NOTE — ED TRIAGE NOTES
Triage Assessment     Row Name 05/03/22 5110       Triage Assessment (Adult)    Airway WDL WDL       Respiratory WDL    Respiratory WDL WDL       Skin Circulation/Temperature WDL    Skin Circulation/Temperature WDL WDL       Cardiac WDL    Cardiac WDL X  Htn       Peripheral/Neurovascular WDL    Peripheral Neurovascular WDL WDL       Cognitive/Neuro/Behavioral WDL    Cognitive/Neuro/Behavioral WDL X  migraine

## 2022-05-04 NOTE — DISCHARGE INSTRUCTIONS
CRYOSURGERY    Your doctor has used a method called cryosurgery to treat your skin condition. Cryosurgery refers to the use of very cold substances to treat a variety of skin conditions such as warts, pre-skin cancers (actinic keratosis), molluscum contagiosum, sun spots, and several benign growths.  The substance we use in cryosurgery is liquid nitrogen and is so cold (-196 degrees Celsius) that it burns when administered.      Following treatment in the office, the skin may immediately burn and become red. You may find the area around the lesion is affected as well.  It is sometimes necessary to treat not only the lesion, but a small area of the surrounding normal skin to achieve a good response.      A blister, and even a blood filled blister, may form after treatment.  This is a normal response.  If the blister is painful, it is acceptable to sterilize a needle with rubbing alcohol and gently pop the blister.  It is important that you gently wash the area with soap and warm water as the blister fluid may contain wart virus if a wart was treated.  Do not remove the roof of the blister.      The blister may form into a dry crust.  The crust should peel off in 2-4 weeks.  There may be some minimal redness after the crust falls off, but this should fade with time.     The area treated can take anywhere from 1-3 weeks to heal. Healing time depends on the kind of skin lesion treated, the location, and how aggressively the lesion was treated.  It is recommended that the areas treated are covered with Vaseline or bacitracin ointment and a band-aid.  If a band-aid is not practical, just the ointment applied several times per day will do.  Keeping these areas \"greasy\" will speed healing time.      Treatment with liquid nitrogen can leave a scar.  In dark skin, it may be a light or dark scar, and in light skin, a white or pink scar.  These will generally fade with time, but may never go away completely.      Please call  Discharge Instructions  Headache    You were seen today for a headache. Headaches may be caused by many different things such as muscle tension, sinus inflammation, anxiety and stress, having too little sleep, too much alcohol, some medical conditions or injury. You may have a migraine, which is caused by changes in the blood vessels in your head.  At this time your provider does not find that your headache is a sign of anything dangerous or life-threatening.  However, sometimes the signs of serious illness do not show up right away.      Generally, every Emergency Department visit should have a follow-up clinic visit with either a primary or a specialty clinic/provider. Please follow-up as instructed by your emergency provider today.    Return to the Emergency Department if:  You get a new fever of 100.4 F or higher.  Your headache gets much worse.  You get a stiff neck with your headache.  You get a new headache that is significantly different or worse than headaches you have had before.  You are vomiting (throwing up) and cannot keep food or water down.  You have blurry or double vision or other problems with your eyes.  You have a new weakness on one side of your body.  You have difficulty with balance which is new.  You or your family thinks you are confused.  You have a seizure.    What can I do to help myself?  Pain medications - You may take a pain medication such as Tylenol  (acetaminophen), Advil , Motrin  (ibuprofen) or Aleve  (naproxen).  Take a pain reliever as soon as you notice symptoms.  Starting medications as soon as you start to have symptoms may lessen the amount of pain you have.  Relaxing in a quiet, dark room may help.  Get enough sleep and eat meals regularly.  You may need to watch for certain foods or other things which may trigger your headaches.  Keeping a journal of your headaches and possible triggers may help you and your primary provider to identify things which you should avoid which  if any questions:    [facility]  [phone]     may be causing your headaches.  If you were given a prescription for medicine here today, be sure to read all of the information (including the package insert) that comes with your prescription.  This will include important information about the medicine, its side effects, and any warnings that you need to know about.  The pharmacist who fills the prescription can provide more information and answer questions you may have about the medicine.  If you have questions or concerns that the pharmacist cannot address, please call or return to the Emergency Department.   Remember that you can always come back to the Emergency Department if you are not able to see your regular provider in the amount of time listed above, if you get any new symptoms, or if there is anything that worries you.

## 2022-05-10 NOTE — PROGRESS NOTES
[facility]     Date of visit: 5/12/2022    Assessment:  Becky Lay is a 61 year old female with a past medical history significant for hyperhidrosis who presents with complaints of neck pain.      1. Neck pain- etiology likely combination of facet arthropathy and myofascial pain, s/p C5-6 discectomy and fusion with Dr. Jose at [facility] in March of 2003.  2. Headaches- etiology likely combination of occipital neuralgia and tension headaches. Myofascial pain   3. Mental Health - the patient's mental health concerns, specifically situational depression, affect her experience of pain and contribute to her clinically significant distress.     Visit Diagnoses:  1. Myofascial pain    2. Tension headache    3. Encounter for therapeutic drug monitoring        Plan:  The following recommendations were given to the patient. Diagnosis, treatment options, risks, benefits, and alternatives were discussed, and all questions were answered. The patient expressed understanding of the plan for management.     I am recommending a multidisciplinary treatment plan to help this patient better manage her pain.  This includes:      1.  Pain Physical Therapy:               Continue to practice gentle stretches and exercises on a regular basis with extra self care.               2.  Pain Psychologist to address relaxation, behavioral change, coping style, and other factors important to improvement.  NO              3.  Medication Management:                           1. We will continue nortriptyline 30mg at bedtime for now. Discussed a possible increase to 40mg or 50mg and its potential benefit. Patient would like to hold off until after her injections to determine which intervention is providing benefit, which is appropriate. EKG ordered today as patient has not had one completed for >15 years for any potential increase in the future. Patient will call to schedule.                           2.  Okay to take Tylenol as needed.               4.  Potential procedures: It is reasonable to repeat trigger point injections, based on her physical exam today and her previous success. Injections ordered today. They will call to schedule.               5.  Referrals: She has had significant success with chiropractor care in the past. I would recommend Kle[facility] Friedman -[phone]. Discussed that because she has been seen by [facility] in the past, she should be able to schedule a follow up appointment. Will place outside referral if needed.              6.  Follow up with NANCY Henderson CNP in 2-4 weeks after injections.        Review of Electronic Chart: Today I have also reviewed available medical information in the patient's medical record at [place] ([facility]), including relevant provider notes, laboratory work, and imaging.     Liss Kothari DNP-FNP Student on 5/12/2022 at 10:53 AM    NANCY Henderson CNP  [facility]       -------------------------------------------------    Subjective:    Reason for consultation:    Becky Lay is a 61 year old female who is seen in consultation today at the request of her PCP,  Paty Thomson for evaluation of her pain issues and recommendations for management, with specific emphasis on  My Clinical Question Is: headache pain   Reason for Referral: Evaluation for Comprehensive Services   Please review opioid agreement in process instructions, do you agree to these terms? Yes   Are there any red flags that may impact the assessment or management of the patient? N/A     Please see the [facility] health questionnaire which the patient completed and reviewed with me in detail.    Review of [place] Prescription Monitoring Program (): No concern for abuse or misuse of controlled medications based on this report.    Review of Electronic Chart: Today I have also reviewed available medical  "information in the patient's medical record at [place] ([facility]), including relevant provider notes, laboratory work, and imaging.     Chief Complaint:    Chief Complaint   Patient presents with     Pain       Pain history:  Her last visit was in April, 2021 when she had trigger point injections with Dr. Gross. She states the injections did not necessarily help, but the pain resolved on its own and she was relatively stable for about a year. Since then, she has been regularly seeing a chiropractor and acupuncturist, although this was interrupted for about 6 months when the therapists she was seeing were let go. Eventually she reestablished care and found great success with this, especially the chiropractor. Unfortunately beginning in March of 2022, she began experiencing worsening of her headaches and neck pain. She continued with her chiropractor care, but did not experience the same degree of relief she did previously and does particularly like her current provider. This progressively worsened up until she had an ED visit on 5/3/2022 for an acute migraine. Her current medications include nortriptyline 30mg, which she has been on a stable dose and finds it improves her pain. She also has an appointment with neurology scheduled at the end of July.     The pain is located at the base of her skull and will wrap around the front of her face in a band like pattern. It will often progress to a migraine-like headache, with photophobia, photophonia and nausea. It is significantly affecting her functioning and as she has recently fully retired, she is hoping to experience relief so that she can enjoy her life again.     Initial HPI with me on 9/23/2019  Becky Lay is a 59 year old female who presents for initial evaluation of chief complaint of neck pain and headaches.       She first started having problems with neck pain and headaches in her 20s. States she, \"tweaked her neck easily.\" Insidious onset, without acute " precipitating event. Her pain gradually worsened over the years, significantly in 2002. She completed physical therapy without benefit. She was referred to neurosurgery in 2003. She had a C5-6 discectomy and fusion with Dr. Jose at [facility] in March of 2003. She notes her neck pain improved greatly post operatively, numbness/ache in left arm subsided. Her neck pain has gradually worsened since 2013, states neck pain usually comes in bouts. She was referred to [facility] around 2013, had x1 visit (?) and a cervical MRI completed. She usually manages her flares with ibuprofen, Flexeril, and occasionally Prednisone with good benefit. This most recent flare started in mid July while on vacation, reports the most significant flare in last six years. Her pain initially subsided with Aleve but then returned. She was referred to me for trigger point injections in August, had on 8/23/19. She notes 2 weeks of significant benefit, possibly 2.5 weeks. Pain returned and has persisted. The pain is located at the right base of her neck, often in left side as well. She has a hx of migraines, the pain starts in the back of her head, can be right sided or bilateral and often radiates over head. Describes the pain as an ache, has photophobia and phonophobia with headaches, also has nausea with headaches. Her headaches are equally as painful as her neck pain. Headaches have been nonstop over the last week, usually 15+ a month, lasting for 24 hours or longer. Very rare numbness and tingling in left forearm only. Subjective weakness in left arm since surgery.     Recommendations from last visit with pain clinic on 11/30/2020:    1.  Pain Physical Therapy:               Completed. Continue to practice gentle stretches and exercises on a regular basis with extra self care.               2.  Pain Psychologist to address relaxation, behavioral change, coping style, and other factors important to improvement.  NO       "        3.  Medication Management:                           1. Since increasing Nortriptyline to 30mg, headaches have nearly been eliminated. Advised she continue nortriptyline 30mg at bedtime for now. She should pay attention to see if hair loss continues to be ongoing. If it is may need to consider tapering off of medication.                           2. Okay to take Tylenol and Flexeril as needed.               4.  Potential procedures: May repeat TPI or ONB at anytime               5.  Referrals: She is going to work on finding new chiropractic and acupuncture locations.               6.  Follow up with NANCY Henderson CNP in 2-3 months.      Pain description:  Location: bilateral occiput, frontal bone, bilateral neck  Quality: aching  Severity/Intensity: 0/10 at best, 8/10 at worst, 1-3/10 on average  Aggravating factors include: turning head side to side (L>R), reading, food prep  Relieving factors include: massage, acupuncture, water, heat/cold, ibuprofen    The patient otherwise denies bowel or bladder incontinence, parasthesias, weakness, saddle anesthesia, unintentional weight loss, or fever/chills/sweats.     Becky Lay has been seen at a pain clinic in the past.  [facility] - 2018 (1 visit), [phone]    Pain Treatments:  (H--helped; HI--Helped initially; [facility]--Somewhat helpful; NH--No help; W--worse; SE--side effects; ?--Unsure if helpful)   1. Medications:       Current pain medications:   Nortriptyline 30mg at bedtime- H, SE, dry mouth     Current calculated MME: 0    1. Previous Pain Relevant Medications:              Opiates:  Vicodin- SE, nausea/vomiting, \"no I don't want it\"               NSAIDS: ibuprofen- NH, naproxen- H              Muscle Relaxants: Flexeril- [facility]/NH/?              Anti-migraine mediations: no              Anti-depressants: nortriptyline- HI              Sleep aids: no              Anxiolytics: no              Neuropathics: no                       Topicals: " no              Other medications not covered above: Tylenol- ?, Prednisone- H     2. Physical Therapy: yes prior to surgery and after- NH, practices occasionally, pain physical therapy- [facility], still utilizes some of the exercises and techniques recommended  3. Pain Psychology: yes Meg Hammer PsyD - [facility] - 'I don't think I need that.'  4. Surgery:  C5-6 discectomy and fusion with Dr. Jose at [facility] in March of 2003  5. Injections:  trigger point injections with Dr. Gross on 4/23/2021- [facility]  repeat bilateral occipital nerve blocks (with steroid) with me on 8/25/2020- [facility]  bilateral TON, C3,4 medial branch blocks #1 with Dr. Gross on 8/10/2020- [facility], W for a few days  bilateral occipital nerve blocks and trigger point injections with me on 6/1/2020- [facility]/ for about 8 weeks, less so than previous occipital nerve blocks   trigger point injections with me on 1/19/2020-  for 2-3 months  bilateral occipital nerve block with Dr. Gross on 11/1/19- H!!!! 8-9 weeks  trigger point injections with me on 9/23/19- H for neck pain, continue to have some benefit  trigger point injections with me 8/23/19 - H for 2 weeks neck pain and headaches, 2.5 weeks tops  6. Chiropractic: yes, regular care - [facility]!   7. Acupuncture: yes - [facility]  8. TENS Unit: no    Past Medical History:  Past Medical History:   Diagnosis Date     DJD C56,7        Past Surgical History:  Past Surgical History:   Procedure Laterality Date     SUNITA VASQUES W/O FACETEC FORAMOT/DSKC 1/2 VRT SEG, CERVICAL  2004    anterior c- 5, 6 fusion       Medications:  Current Outpatient Medications   Medication Sig Dispense Refill     acetaminophen (TYLENOL) 500 MG tablet Take 500-1,000 mg by mouth every 6 hours as needed for mild pain       CITRACAL/VITAMIN D PO 2 a day       estradiol (ESTRACE) 0.1 MG/GM vaginal cream PLACE 2 GRAMS VAGINALLY 3 TIMES A WEEK 42.5 g prn     ibuprofen (ADVIL/MOTRIN) 200 MG tablet Take 200 mg by mouth every 4 hours as needed for mild pain        nortriptyline (PAMELOR) 10 MG capsule Take 3 capsules (30 mg) by mouth At Bedtime 270 capsule prn       Allergies:     Allergies   Allergen Reactions     No Known Allergies        Social History:  Home situation: , lives at home with   Support system: Family  Occupation/Schooling: School RN - retired  Tobacco use: no  Drug use: no  Alcohol use: very rarely  History of chemical dependency treatment: no  Mental health admissions: no    Family history:  Family History   Problem Relation Age of Onset     Osteoporosis Mother      Lipids Mother      Dementia Mother      Heart Disease Father         MI age 50/BP and Chol Meds     Cancer Maternal Grandmother         colon     Cancer Maternal Grandfather         lung ca     Colon Cancer No family hx of      Review of Systems:    POSTIVE IN BOLD  GENERAL: fever/chills, fatigue, general unwell feeling, weight gain/loss.  HEAD/EYES:  headache, dizziness, or vision changes, photophobia  EARS/NOSE/THROAT: nosebleeds, hearing loss, sinus infection, earache, tinnitus.  IMMUNE:  allergies, cancer, immune deficiency, or infections.  SKIN:  itching, rash, hives  HEME/Lymphatic: anemia, easy bruising, easy bleeding.  RESPIRATORY: cough, wheezing, or shortness of breath  CARDIOVASCULAR/Circulation: extremity edema, syncope, hypertension, tachycardia, or angina.  GASTROINTESTINAL: abdominal pain, nausea/emesis, diarrhea, constipation,  hematochezia, or melena.  ENDOCRINE:  diabetes, steroid use,  thyroid disease or osteoporosis.  MUSCULOSKELETAL: joint pain, stiffness, neck pain, back pain, arthritis, or gout.  GENITOURINARY: frequency, urgency, dysuria, difficulty voiding, hematuria or incontinence.  NEUROLOGIC: weakness, numbness, paresthesias, seizure, tremor, stroke or memory loss.  PSYCHIATRIC: depression, anxiety, stress, suicidal thoughts or mood swings.     Objective:    Imaging:  MRI of cervial spine was completed on 10/2019 and shows:      Physical  Exam:  Vitals:    05/12/22 0905   BP: 127/86   Pulse: 86   SpO2: 98%     Exam:  Constitutional: Well developed, well nourished, appears stated age.  HEENT: Head atraumatic, normocephalic. Eyes without conjunctival injection or jaundice. Oropharynx clear. Neck supple. No obvious neck masses.  : Deferred.   Skin: No rash, lesions, or petechiae of exposed skin.   Extremities: Peripheral pulses intact. No clubbing, cyanosis, or edema.  Psychiatric/mental status: Alert, without lethargy or stupor. Speech fluent. Appropriate affect. Mood normal. Able to follow commands without difficulty.     Musculoskeletal exam:  Able to walk on the heels and toes without difficulty. Patient has antalgic gait favoring the neither side.   Normal bulk and tone. Unremarkable spinal curvature.     Cervical spine:  Range of motion slightly decreased related to pain, L=R  Tenderness in the cervical paraspinal muscles.Yes, bilateral tenderness to base of skull, mildly tender to greater occipital nerve bilateral   Rotation/ext to right: painful   Rotation/ext to left: painful     Thoracic spine:    Kyphosis. No   Tenderness in the thoracic paraspinal muscles.No    Lumbar spine:    Flex:  90 degrees   Ext: 30 degrees   Tenderness in the lumbar paraspinal muscles.No   Rotation/ext to right: pain free   Rotation/ext to left: pain free    Neurologic exam:  CN:  Cranial nerves 2-12 are grossly intact  Motor:  5/5 UE and LE strength      Reflexes:     Biceps:     R:  2/4 L: 2/4   Brachioradialis   R:  2/4 L: 2/4   Patella:  R:  2/4 L: 2/4   Achilles:  R:  2/4 L: 2/4    Sensory:   Light touch: normal bilateral upper and lower extremities    No allodynia, dysesthesia, or hyperalgesia.    I saw and examined the patient with the NP student have reviewed and agree with the NP student's note and plan of care and made changes and corrections directly to the body of the note.    TIME SPENT:  BY NP STUDENT ALONE 20 MIN  BY MYSELF AND NP STUDENT TOGETHER 35  MIN  BY MYSELF WITHOUT THE NP STUDENT 5 MIN      BILLING TIME DOCUMENTATION:   The total TIME spent on this patient on the date of the encounter/appointment was 40 minutes.      TOTAL TIME includes:   Time spent preparing to see the patient (reviewing records and tests)   Time spent face to face (or over the phone) with the patient   Time spent ordering tests, medications, procedures and referrals   Time spent Referring and communicating with other healthcare professionals   Time spent documenting clinical information in Epic

## 2022-05-12 ENCOUNTER — OFFICE VISIT (OUTPATIENT)
Dept: PALLIATIVE MEDICINE | Facility: CLINIC | Age: 62
End: 2022-05-12
Payer: COMMERCIAL

## 2022-05-12 VITALS — OXYGEN SATURATION: 98 % | HEART RATE: 86 BPM | SYSTOLIC BLOOD PRESSURE: 127 MMHG | DIASTOLIC BLOOD PRESSURE: 86 MMHG

## 2022-05-12 DIAGNOSIS — M79.18 MYOFASCIAL PAIN: Primary | ICD-10-CM

## 2022-05-12 DIAGNOSIS — Z51.81 ENCOUNTER FOR THERAPEUTIC DRUG MONITORING: ICD-10-CM

## 2022-05-12 DIAGNOSIS — G44.209 TENSION HEADACHE: ICD-10-CM

## 2022-05-12 PROCEDURE — 99215 OFFICE O/P EST HI 40 MIN: CPT | Performed by: NURSE PRACTITIONER

## 2022-05-12 ASSESSMENT — PAIN SCALES - GENERAL: PAINLEVEL: MODERATE PAIN (4)

## 2022-05-12 NOTE — NURSING NOTE
PEG Score 5/12/2022   PEG Total Score 4.67         Meliza Elam MA  [facility] [place]

## 2022-05-12 NOTE — PATIENT INSTRUCTIONS
1.  Pain Physical Therapy:               Continue to practice gentle stretches and exercises on a regular basis with extra self care.               2.  Pain Psychologist to address relaxation, behavioral change, coping style, and other factors important to improvement.  NO              3.  Medication Management:                           1. Continue nortriptyline 30mg at bedtime.                           2. Okay to take Tylenol as needed.               4.  Potential procedures: repeat TPI ordered today. They will call to schedule.               5.  Referrals: I would recommend [facility] Friedman -[phone]. Call to schedule follow up with [facility].               6.  Follow up with NANCY Henderson CNP in 2-4 weeks after injections.        ----------------------------------------------------------------  Clinic Number:  [phone]   Call with any questions about your care and for scheduling assistance.   Calls are returned Monday through Friday between 8 AM and 4:30 PM. We usually get back to you within 2 business days depending on the issue/request.    If we are prescribing your medications:  For opioid medication refills, call the clinic or send a [employer] message 7 days in advance.  Please include:  Name of requested medication  Name of the pharmacy.  For non-opioid medications, call your pharmacy directly to request a refill. Please allow 3-4 days to be processed.   Per [place] State Law:  All controlled substance prescriptions must be filled within 30 days of being written.    For those controlled substances allowing refills, pickup must occur within 30 days of last fill.      We believe regular attendance is key to your success in our program!    Any time you are unable to keep your appointment we ask that you call us at least 24 hours in advance to cancel.This will allow us to offer the appointment time to another patient.   Multiple missed appointments may lead to dismissal from  the clinic.

## 2022-05-13 ENCOUNTER — TELEPHONE (OUTPATIENT)
Dept: PALLIATIVE MEDICINE | Facility: CLINIC | Age: 62
End: 2022-05-13
Payer: COMMERCIAL

## 2022-05-13 NOTE — TELEPHONE ENCOUNTER
Pre-screening Questions for Clinic Based Injections, Shoulder/Knee, ONB, TPI Injections and Bursa Injection:    Botox- no questions needed  *of note, it is possible to do occipital nerve blocks and trigger point injections without steroid, so if they feel they need to schedule, we can do that and leave out steroid.      Location:    [place]:     Only schedule on Wednesdays (ultrasound machine NOT available Tuesday and Thursday)    If need to double book, use the 3:30 pm slot     [place]:  Ultrasound appointments NOT available at this time    [place]:      [place]:  Please send to RN pool after scheduling to verify U/S availability     Does patient have an active infection or treated for one within the past week? No  (If YES, do NOT schedule and route to RN)     Is patient currently taking any antibiotics?  No  (If YES, do NOT schedule and route to RN)  For patients on chronic, preventative or prophylactic antibiotics, procedures can be scheduled.    Johnie Epstein Snitzer-antibiotic course must have been completed for 4 days    Is patient taking any aspirin products? No     No need to hold non-aspirin anticoagulants.     If taking > 325mg/day of aspirin, limit aspirin to 81-325mg/day x 1 week.     No hold required day of procedure.    Has the patient had a flu shot or any other vaccinations within 7 days before or after the procedure.  No     Have you recently had a COVID vaccine or have plans to get it in the near future? No    If yes, explain that for the vaccine to work best they need to:       wait 1 week before and 1 week after getting Vaccine #1    wait 1 week before and 2 weeks after getting Vaccine #2    If patient has concerns about the timing, send to PATY OCHOA    [facility]

## 2022-05-20 ENCOUNTER — TELEPHONE (OUTPATIENT)
Dept: PALLIATIVE MEDICINE | Facility: CLINIC | Age: 62
End: 2022-05-20
Payer: COMMERCIAL

## 2022-05-20 DIAGNOSIS — Z51.81 ENCOUNTER FOR THERAPEUTIC DRUG MONITORING: Primary | ICD-10-CM

## 2022-05-20 NOTE — TELEPHONE ENCOUNTER
Routing to provider to review/order    Vaishali GALAN RN Care Coordinator  [facility]

## 2022-05-20 NOTE — TELEPHONE ENCOUNTER
I have had a hard time with that order- I did place an EKG order on 5/12 but it must not have been correct. I placed another similar order. Please let me know if this is also incorrect.    NANCY Henderson [facility]

## 2022-05-20 NOTE — TELEPHONE ENCOUNTER
Per pt an order for an EKG was to be placed for her. Please fax to [phone].      Isabel Sargent    [facility] [place]

## 2022-05-23 NOTE — PROGRESS NOTES
[facility] - Procedure Note    Date of Visit: 5/24/2022    Pre procedure Diagnosis: myofascial pain/myositis 60.9   Post procedure Diagnosis: Same  Procedure performed: trigger point injections  Complications: none  Operators: Jacquelyn CRUZ CNP    /82   Pulse 81   LMP 06/20/2008   SpO2 100%     Indications:   Becky Lay is a 61 year old female with a history of neck pain.  Exam shows myofascial pain of the muscle groups listed below and they have tried conservative treatment including PT and medications.    Options/alternatives, benefits and risks were discussed with the patient including bleeding, infection, tissue trauma and pnuemothorax.  Questions were answered to her satisfaction and she agrees to proceed. Voluntary informed consent was obtained and signed.     Vitals allergies and medications were reviewed.    This is a repeat injection. Previous TPI on 4/23/2021 was not significantly helpful.     Procedure:  After getting informed consent, a Pause for the Cause was performed.    Trigger points were identified by patient, and marked when appropriate.  The area was prepped with Chloroprep.    Using clean technique, injections were completed using a 25G, 1.5 inch needle.  After negative aspiration, injection was completed.  A total of 6 locations were injected.  When possible, tissue was retracted from the chest wall to avoid lung injury.    Muscle groups injected:  Bilateral splenius capitis, levator scapulae and left trapezius    Injection solution contained:  10ml of 0.5% bupivacaine.    Hemostasis was achieved, the area was cleaned, and bandaids were placed when appropriate.  The patient tolerated the procedure well.  Respirations were equal and unlabored.     Follow-up includes:   -f/u with the referring provider  -repeat as needed      Jacquelyn CRUZ CNP  [facility] [place]

## 2022-05-24 ENCOUNTER — OFFICE VISIT (OUTPATIENT)
Dept: PALLIATIVE MEDICINE | Facility: CLINIC | Age: 62
End: 2022-05-24
Payer: COMMERCIAL

## 2022-05-24 VITALS — HEART RATE: 81 BPM | SYSTOLIC BLOOD PRESSURE: 122 MMHG | OXYGEN SATURATION: 100 % | DIASTOLIC BLOOD PRESSURE: 82 MMHG

## 2022-05-24 DIAGNOSIS — M79.18 MYOFASCIAL PAIN: ICD-10-CM

## 2022-05-24 PROCEDURE — 20553 NJX 1/MLT TRIGGER POINTS 3/>: CPT | Performed by: NURSE PRACTITIONER

## 2022-05-24 RX ORDER — BUPIVACAINE HYDROCHLORIDE 5 MG/ML
10 INJECTION, SOLUTION EPIDURAL; INTRACAUDAL ONCE
Status: COMPLETED | OUTPATIENT
Start: 2022-05-24 | End: 2022-05-24

## 2022-05-24 RX ADMIN — BUPIVACAINE HYDROCHLORIDE 50 MG: 5 INJECTION, SOLUTION EPIDURAL; INTRACAUDAL at 11:00

## 2022-05-24 ASSESSMENT — PAIN SCALES - GENERAL: PAINLEVEL: MODERATE PAIN (4)

## 2022-05-24 NOTE — PATIENT INSTRUCTIONS
[facility]  Post Procedure Instructions    Today you had:  trigger point injections       Medications used:  lidocaine   bupivicaine   kenolog   dexamethasone        Go to the emergency room if you develop any shortness of breath  Monitor the injection sites for signs and symptoms of infection-fever, chills, redness, swelling, warmth, or drainage to areas.  You may have soreness at injection sites for up to 24 hours.  It may take up to 14 days for the steroid medication to start working although you may feel the effect as early as a few days after the procedure.     You may apply ice to the painful areas to help minimize the discomfort of the needle pokes.  Do not apply heat to sites for at least 12 hours.  You may use anti-inflammatory medications or Tylenol for pain control if necessary    Pain Clinic phone number during work hours (Monday through Friday 8 am-4:30 pm) at [phone] or the Provider Line after hours at [phone]:

## 2022-05-27 ENCOUNTER — HOSPITAL ENCOUNTER (OUTPATIENT)
Dept: CARDIOLOGY | Facility: CLINIC | Age: 62
Discharge: HOME | End: 2022-05-27
Attending: NURSE PRACTITIONER | Admitting: NURSE PRACTITIONER
Payer: COMMERCIAL

## 2022-05-27 DIAGNOSIS — Z51.81 ENCOUNTER FOR THERAPEUTIC DRUG MONITORING: ICD-10-CM

## 2022-05-27 PROCEDURE — 93005 ELECTROCARDIOGRAM TRACING: CPT

## 2022-06-01 LAB
ATRIAL RATE - MUSE: 91 BPM
DIASTOLIC BLOOD PRESSURE - MUSE: NORMAL MMHG
INTERPRETATION ECG - MUSE: NORMAL
P AXIS - MUSE: 81 DEGREES
PR INTERVAL - MUSE: 146 MS
QRS DURATION - MUSE: 78 MS
QT - MUSE: 358 MS
QTC - MUSE: 440 MS
R AXIS - MUSE: 98 DEGREES
SYSTOLIC BLOOD PRESSURE - MUSE: NORMAL MMHG
T AXIS - MUSE: 66 DEGREES
VENTRICULAR RATE- MUSE: 91 BPM

## 2022-06-16 ENCOUNTER — LAB (OUTPATIENT)
Dept: LAB | Facility: CLINIC | Age: 62
End: 2022-06-16
Payer: COMMERCIAL

## 2022-06-16 DIAGNOSIS — R51.9 FACIAL PAIN: Primary | ICD-10-CM

## 2022-06-16 LAB
ANION GAP SERPL CALCULATED.3IONS-SCNC: 4 MMOL/L (ref 3–14)
BASOPHILS # BLD AUTO: 0 10E3/UL (ref 0–0.2)
BASOPHILS NFR BLD AUTO: 1 %
BUN SERPL-MCNC: 11 MG/DL (ref 7–30)
CALCIUM SERPL-MCNC: 9.3 MG/DL (ref 8.5–10.1)
CHLORIDE BLD-SCNC: 101 MMOL/L (ref 94–109)
CO2 SERPL-SCNC: 28 MMOL/L (ref 20–32)
CREAT SERPL-MCNC: 0.73 MG/DL (ref 0.52–1.04)
EOSINOPHIL # BLD AUTO: 0.1 10E3/UL (ref 0–0.7)
EOSINOPHIL NFR BLD AUTO: 2 %
ERYTHROCYTE [DISTWIDTH] IN BLOOD BY AUTOMATED COUNT: 12.5 % (ref 10–15)
ERYTHROCYTE [SEDIMENTATION RATE] IN BLOOD BY WESTERGREN METHOD: 5 MM/HR (ref 0–30)
GFR SERPL CREATININE-BSD FRML MDRD: >90 ML/MIN/1.73M2
GLUCOSE BLD-MCNC: 92 MG/DL (ref 70–99)
HCT VFR BLD AUTO: 37.6 % (ref 35–47)
HGB BLD-MCNC: 12.5 G/DL (ref 11.7–15.7)
LYMPHOCYTES # BLD AUTO: 1.3 10E3/UL (ref 0.8–5.3)
LYMPHOCYTES NFR BLD AUTO: 24 %
MCH RBC QN AUTO: 28.1 PG (ref 26.5–33)
MCHC RBC AUTO-ENTMCNC: 33.2 G/DL (ref 31.5–36.5)
MCV RBC AUTO: 85 FL (ref 78–100)
MONOCYTES # BLD AUTO: 0.4 10E3/UL (ref 0–1.3)
MONOCYTES NFR BLD AUTO: 7 %
NEUTROPHILS # BLD AUTO: 3.5 10E3/UL (ref 1.6–8.3)
NEUTROPHILS NFR BLD AUTO: 66 %
PLATELET # BLD AUTO: 257 10E3/UL (ref 150–450)
POTASSIUM BLD-SCNC: 3.8 MMOL/L (ref 3.4–5.3)
RBC # BLD AUTO: 4.45 10E6/UL (ref 3.8–5.2)
SODIUM SERPL-SCNC: 133 MMOL/L (ref 133–144)
WBC # BLD AUTO: 5.3 10E3/UL (ref 4–11)

## 2022-06-16 PROCEDURE — 80048 BASIC METABOLIC PNL TOTAL CA: CPT

## 2022-06-16 PROCEDURE — 36415 COLL VENOUS BLD VENIPUNCTURE: CPT

## 2022-06-16 PROCEDURE — 85025 COMPLETE CBC W/AUTO DIFF WBC: CPT

## 2022-06-16 PROCEDURE — 85652 RBC SED RATE AUTOMATED: CPT

## 2022-06-21 NOTE — PROGRESS NOTES
[facility]     Date of visit: 6/23/2022      Assessment:   Becky Lay is a 61 year old female with a past medical history significant for hyperhidrosis who presents with complaints of neck pain.      1. Neck pain- etiology likely combination of facet arthropathy and myofascial pain, s/p C5-6 discectomy and fusion with Dr. Jose at [facility] in March of 2003.  2. Headaches- etiology likely combination of occipital neuralgia and tension headaches. Myofascial pain   3. Mental Health - the patient's mental health concerns, specifically situational depression, affect her experience of pain and contribute to her clinically significant distress.      Visit Diagnoses:  1. Tension headache    2. Bilateral occipital neuralgia    3. Myofascial pain        Plan:               1.  Pain Physical Therapy:               I encouraged she continue to practice gentle stretches and exercises on a regular basis with extra self care.               2.  Pain Psychologist to address relaxation, behavioral change, coping style, and other factors important to improvement.  NO              3.  Medication Management:                           1. Some additional benefit with increased dose of nortriptyline. We will increase nortriptyline to 50mg at bedtime. Monitor pain benefit.                           2. Okay to continue Tylenol as needed.               4.  Potential procedures: Becky Montenegro would like to be aggressive with pain management right now, feels as though it is limiting her life too much. She would like to repeat trigger point injections sooner and with steroid- -half dose used in September of 2019. It is reasonable to repeat trigger point injections with steroid. Injections ordered today. They will call to schedule.               5.  Referrals: recommended she restart with chiropractor as she would like to be aggressive.               6.  Follow up with NANCY Henderson CNP in 2-4 weeks after  injections.       Jacquelyn CRUZ CNP  [facility]     -------------------------------------------------    Subjective:    Chief complaint:   Chief Complaint   Patient presents with     Pain       Interval history:  Becky Lay is a 61 year old female last seen on 5/12/2022.  she is seen in follow up.     Recommendations/plan at the last visit included:             1.  Pain Physical Therapy:               Continue to practice gentle stretches and exercises on a regular basis with extra self care.               2.  Pain Psychologist to address relaxation, behavioral change, coping style, and other factors important to improvement.  NO              3.  Medication Management:                           1. We will continue nortriptyline 30mg at bedtime for now. Discussed a possible increase to 40mg or 50mg and its potential benefit. Patient would like to hold off until after her injections to determine which intervention is providing benefit, which is appropriate. EKG ordered today as patient has not had one completed for >15 years for any potential increase in the future. Patient will call to schedule.                           2. Okay to take Tylenol as needed.               4.  Potential procedures: It is reasonable to repeat trigger point injections, based on her physical exam today and her previous success. Injections ordered today. They will call to schedule.               5.  Referrals: She has had significant success with chiropractor care in the past. I would recommend [facility] -[phone]. Discussed that because she has been seen by [facility] in the past, she should be able to schedule a follow up appointment. Will place outside referral if needed.              6.  Follow up with NANCY Henderson CNP in 2-4 weeks after injections.        Since her last visit, Becky Lay reports:  -Her pain is somewhat better than it was at last visit.  "  -She had trigger point injections with me on 5/24/2022. She reports improvement in muscle tightness, fatigue, and nausea/dizziness but still has some break through pain. She is interested in repeating.    -She had considered an increase in nortriptyline as discussed at last visit. She discussed with her neurologist and he agreed with plan to increase nortriptyline. She increased to 40mg on 6/18, she has reported some improvement in symptoms but not enough.   -She would like to be aggressive with pain management right now.   -She continues to have tension headaches, at the front of her head and back of her head. States she does not have the same significant pain radiating over the top of her head as she did previously.   -She hasn't established with chiropractic care, hasn't felt well and didn't feel up to driving to [place]. She found her old chiropractor and plans to restart soon.   -She had a follow up visit with [facility]. She had updated MRI of her brain and blood work completed- all of which was normal. She had a sleep apnea study done as well.     Pain Information:   Pain rating: averages 5/10 on a 0-10 scale.      Current pain medications:    Nortriptyline 40mg at bedtime- H, SE, dry mouth     Current MME: 0    Review of [place] Prescription Monitoring Program (): No concern for abuse or misuse of controlled medications based on this report.     Annual Controlled Substance Agreement/UDS due date: NA    Past pain treatments:  1. Previous Pain Relevant Medications:              Opiates:  Vicodin- SE, nausea/vomiting, \"no I don't want it\"               NSAIDS: ibuprofen- NH, naproxen- H              Muscle Relaxants: Flexeril- [facility]/?              Anti-migraine mediations: no              Anti-depressants: nortriptyline- HI              Sleep aids: no              Anxiolytics: no              Neuropathics: no                       Topicals: no              Other medications not " covered above: Tylenol- ?, Prednisone- H     2. Physical Therapy: yes prior to surgery and after- [facility], practices occasionally, pain physical therapy- [facility], still utilizes some of the exercises and techniques recommended  3. Pain Psychology: yes Meg Hammer PsyD - [facility] - 'I don't think I need that.'  4. Surgery:  C5-6 discectomy and fusion with Dr. Jose at [facility] in March of 2003  5. Injections:    trigger point injections with me on 5/24/2022-     trigger point injections with Dr. Gross on 4/23/2021- [facility]    repeat bilateral occipital nerve blocks (with steroid) with me on 8/25/2020- [facility]    bilateral TON, C3,4 medial branch blocks #1 with Dr. Gross on 8/10/2020- [facility], W for a few days    bilateral occipital nerve blocks and trigger point injections with me on 6/1/2020- [facility]/ for about 8 weeks, less so than previous occipital nerve blocks     trigger point injections with me on 1/9/2020-  for 2-3 months    bilateral occipital nerve block with Dr. Gross on 11/1/19- H!!!! 8-9 weeks    trigger point injections with me on 9/23/19- H for neck pain, continue to have some benefit    trigger point injections with me 8/23/19 - H for 2 weeks neck pain and headaches, 2.5 weeks tops  6. Chiropractic: yes, regular care - H!   7. Acupuncture: yes - H  8. TENS Unit: no       Medications:  Current Outpatient Medications   Medication Sig Dispense Refill     acetaminophen (TYLENOL) 500 MG tablet Take 500-1,000 mg by mouth every 6 hours as needed for mild pain       CITRACAL/VITAMIN D PO 2 a day       estradiol (ESTRACE) 0.1 MG/GM vaginal cream PLACE 2 GRAMS VAGINALLY 3 TIMES A WEEK 42.5 g prn     ibuprofen (ADVIL/MOTRIN) 200 MG tablet Take 200 mg by mouth every 4 hours as needed for mild pain       nortriptyline (PAMELOR) 50 MG capsule Take 1 capsule (50 mg) by mouth At Bedtime 30 capsule 1       Medical History: any changes in medical history since they were last seen? No    Review of Systems: A 10-point review of  systems was negative, with the exception of chronic pain issues.     Objective:    Physical Exam:  Blood pressure 125/83, pulse 88, weight 59.5 kg (131 lb 1.6 oz), last menstrual period 06/20/2008, SpO2 99 %, not currently breastfeeding.  Constitutional: Well developed, well nourished, appears stated age.  Gait: Normal  HEENT: Head atraumatic, normocephalic. Eyes without conjunctival injection or jaundice. Oropharynx clear. Neck supple. No obvious neck masses.  Skin: No rash, lesions, or petechiae of exposed skin.   Psychiatric/mental status: Alert, without lethargy or stupor. Speech fluent. Appropriate affect. Mood normal. Able to follow commands without difficulty.     Imaging:  MRI of cervial spine was completed on 10/2019 and shows:         BILLING TIME DOCUMENTATION:   The total TIME spent on this patient on the date of the encounter/appointment was 22 minutes.      TOTAL TIME includes:   Time spent preparing to see the patient (reviewing records and tests)   Time spent face to face (or over the phone) with the patient   Time spent ordering tests, medications, procedures and referrals   Time spent Referring and communicating with other healthcare professionals   Time spent documenting clinical information in Epic

## 2022-06-23 ENCOUNTER — OFFICE VISIT (OUTPATIENT)
Dept: PALLIATIVE MEDICINE | Facility: CLINIC | Age: 62
End: 2022-06-23
Payer: COMMERCIAL

## 2022-06-23 VITALS
WEIGHT: 131.1 LBS | SYSTOLIC BLOOD PRESSURE: 125 MMHG | OXYGEN SATURATION: 99 % | HEART RATE: 88 BPM | DIASTOLIC BLOOD PRESSURE: 83 MMHG | BODY MASS INDEX: 21.75 KG/M2

## 2022-06-23 DIAGNOSIS — M54.81 BILATERAL OCCIPITAL NEURALGIA: ICD-10-CM

## 2022-06-23 DIAGNOSIS — G44.209 TENSION HEADACHE: Primary | ICD-10-CM

## 2022-06-23 DIAGNOSIS — M79.18 MYOFASCIAL PAIN: ICD-10-CM

## 2022-06-23 PROCEDURE — 99213 OFFICE O/P EST LOW 20 MIN: CPT | Performed by: NURSE PRACTITIONER

## 2022-06-23 RX ORDER — NORTRIPTYLINE HYDROCHLORIDE 50 MG/1
50 CAPSULE ORAL AT BEDTIME
Qty: 30 CAPSULE | Refills: 1 | Status: SHIPPED | OUTPATIENT
Start: 2022-06-23 | End: 2022-08-01 | Stop reason: DRUGHIGH

## 2022-06-23 ASSESSMENT — PAIN SCALES - GENERAL: PAINLEVEL: MODERATE PAIN (5)

## 2022-06-23 NOTE — PATIENT INSTRUCTIONS
1.  Pain Physical Therapy:               Continue to practice gentle stretches and exercises on a regular basis with extra self care.               2.  Pain Psychologist to address relaxation, behavioral change, coping style, and other factors important to improvement.  NO              3.  Medication Management:                           1. Increase nortriptyline to 50mg at bedtime. Monitor pain benefit.                           2. Okay to take Tylenol as needed.               4.  Potential procedures: It is reasonable to repeat trigger point injections. Injections ordered today. They will call to schedule.               5.  Referrals: restart with chiropractor if interested.               6.  Follow up with NANCY Henderson CNP in 2-4 weeks after injections.     ----------------------------------------------------------------  Clinic Number:  [phone]   Call with any questions about your care and for scheduling assistance.   Calls are returned Monday through Friday between 8 AM and 4:30 PM. We usually get back to you within 2 business days depending on the issue/request.    If we are prescribing your medications:  For opioid medication refills, call the clinic or send a [employer] message 7 days in advance.  Please include:  Name of requested medication  Name of the pharmacy.  For non-opioid medications, call your pharmacy directly to request a refill. Please allow 3-4 days to be processed.   Per [place] State Law:  All controlled substance prescriptions must be filled within 30 days of being written.    For those controlled substances allowing refills, pickup must occur within 30 days of last fill.      We believe regular attendance is key to your success in our program!    Any time you are unable to keep your appointment we ask that you call us at least 24 hours in advance to cancel.This will allow us to offer the appointment time to another patient.   Multiple missed appointments may lead to dismissal  from the clinic.

## 2022-07-05 NOTE — PROGRESS NOTES
[facility] - Procedure Note    Date of Visit: 7/5/2022    Pre procedure Diagnosis: myofascial pain/myositis 60.9   Post procedure Diagnosis: Same  Procedure performed: trigger point injections  Complications: none  Operators: Jacquelyn CRUZ CNP    /78   Pulse 102   LMP 06/20/2008     Indications:   Becky Lay is a 61 year old female with a history of neck pain.  Exam shows myofascial pain of the muscle groups listed below and they have tried conservative treatment including PT and medications.    Options/alternatives, benefits and risks were discussed with the patient including bleeding, infection, tissue trauma and pnuemothorax.  Questions were answered to her satisfaction and she agrees to proceed. Voluntary informed consent was obtained and signed.     Vitals allergies and medications were reviewed.    This is a repeat injection. Previous TPI on 5/24/2022 continues to provide some benefit.     Procedure:  After getting informed consent, a Pause for the Cause was performed.    Trigger points were identified by patient, and marked when appropriate.  The area was prepped with Chloroprep.    Using clean technique, injections were completed using a 25G, 1.5 inch needle.  After negative aspiration, injection was completed.  A total of 8 locations were injected.  When possible, tissue was retracted from the chest wall to avoid lung injury.    Muscle groups injected:  Bilateral trapezius, splenius capitis, levator scapulae     Injection solution contained:  9ml of 0.5% bupivacaine & 20mg kenolog.    Hemostasis was achieved, the area was cleaned, and bandaids were placed when appropriate.  The patient tolerated the procedure well.  Respirations were equal and unlabored.      Follow-up includes:   -f/u with the referring provider  -repeat as needed      Jacquelyn CRUZ CNP  [facility] [place]

## 2022-07-07 ENCOUNTER — OFFICE VISIT (OUTPATIENT)
Dept: PALLIATIVE MEDICINE | Facility: CLINIC | Age: 62
End: 2022-07-07
Attending: NURSE PRACTITIONER
Payer: COMMERCIAL

## 2022-07-07 VITALS — HEART RATE: 102 BPM | DIASTOLIC BLOOD PRESSURE: 78 MMHG | SYSTOLIC BLOOD PRESSURE: 118 MMHG

## 2022-07-07 DIAGNOSIS — M79.18 MYOFASCIAL PAIN: ICD-10-CM

## 2022-07-07 PROCEDURE — 20553 NJX 1/MLT TRIGGER POINTS 3/>: CPT | Performed by: NURSE PRACTITIONER

## 2022-07-07 RX ORDER — TRIAMCINOLONE ACETONIDE 40 MG/ML
20 INJECTION, SUSPENSION INTRA-ARTICULAR; INTRAMUSCULAR ONCE
Status: COMPLETED | OUTPATIENT
Start: 2022-07-07 | End: 2022-07-07

## 2022-07-07 RX ORDER — BUPIVACAINE HYDROCHLORIDE 5 MG/ML
9 INJECTION, SOLUTION PERINEURAL ONCE
Status: COMPLETED | OUTPATIENT
Start: 2022-07-07 | End: 2022-07-07

## 2022-07-07 RX ADMIN — BUPIVACAINE HYDROCHLORIDE 45 MG: 5 INJECTION, SOLUTION PERINEURAL at 09:58

## 2022-07-07 RX ADMIN — TRIAMCINOLONE ACETONIDE 20 MG: 40 INJECTION, SUSPENSION INTRA-ARTICULAR; INTRAMUSCULAR at 09:58

## 2022-07-07 ASSESSMENT — PAIN SCALES - GENERAL: PAINLEVEL: MODERATE PAIN (4)

## 2022-07-29 NOTE — PROGRESS NOTES
[facility]     Date of visit: 8/1/2022      Assessment:   Becky Lay is a 62 year old female with a past medical history significant for hyperhidrosis who presents with complaints of neck pain.      1. Neck pain- etiology likely combination of facet arthropathy and myofascial pain, s/p C5-6 discectomy and fusion with Dr. Jose at [facility] in March of 2003.  2. Headaches- etiology likely combination of occipital neuralgia and tension headaches. Myofascial pain   3. Mental Health - the patient's mental health concerns, specifically situational depression, affect her experience of pain and contribute to her clinically significant distress.      Visit Diagnoses:  1. Myofascial pain    2. Tension headache        Plan:               1.  Pain Physical Therapy:              Continue to practice gentle stretches and exercises on a regular basis with extra self care.               2.  Pain Psychologist to address relaxation, behavioral change, coping style, and other factors important to improvement.  NO              3.  Medication Management:                           1. Nortriptyline continues to be helpful in headache prevention and neck pain. She would like to be on the lowest effective dose. We will continue nortriptyline 30mg at bedtime. If she is noticing worsening pain for a couple of days or more at a time, advised she increase to 40mg at bedtime.                           2. Okay to continue Tylenol as needed.               4.  Potential procedures: repeat trigger point injections ordered today to be completed in early October prior to cruise. If needed, advised she reach out to the clinic sooner to have these done- but without steroid.               5.  Referrals: restart with chiropractor and acupuncture as she feels would be helpful.               6.  Follow up with NANCY Henderson CNP in October.     Jacquelyn CRUZ CNP  [facility]  "    -------------------------------------------------    Subjective:    Chief complaint:   Chief Complaint   Patient presents with     Pain       Interval history:  Becky Lay is a 61 year old female last seen on 6/23/2022.  she is seen in follow up.     Recommendations/plan at the last visit included:             1.  Pain Physical Therapy:               I encouraged she continue to practice gentle stretches and exercises on a regular basis with extra self care.               2.  Pain Psychologist to address relaxation, behavioral change, coping style, and other factors important to improvement.  NO              3.  Medication Management:                           1. Some additional benefit with increased dose of nortriptyline. We will increase nortriptyline to 50mg at bedtime. Monitor pain benefit.                           2. Okay to continue Tylenol as needed.               4.  Potential procedures: Becky Montenegro would like to be aggressive with pain management right now, feels as though it is limiting her life too much. She would like to repeat trigger point injections sooner and with steroid- -half dose used in September of 2019. It is reasonable to repeat trigger point injections with steroid. Injections ordered today. They will call to schedule.               5.  Referrals: recommended she restart with chiropractor as she would like to be aggressive.               6.  Follow up with NANCY Henderson CNP in 2-4 weeks after injections.          Since her last visit, Becky aLy reports:  -Her pain is better than it was at last visit.   -She states, \"I'm feeling really good.\"   -She attributes the improvement to be due to recent trigger point injections on 7/7/2022. States this has made a significant improvement in her pain, about 75%, \"it took all that pain and pressure away in my skull.\" Her current neck pain is manageable.   -She increase nortriptyline as directed to 50mg with some improvement in " "pain and headaches, but the dry mouth was terrible, \"like burning mouth.\" She reduced to 40mg with some improvement but wanted to go lower so reduced to 30mg.   -She continues regular physical activity, walking and stretching.   -She hasn't returned for chiropractic care as she hasn't felt like she needed it.   -She wonders about plan for long term management of pain and repeating trigger point injections. She has a plan to go on a cruise with her  in October.     Pain Information:   Pain rating: averages 1/10 on a 0-10 scale.      Current pain medications:    Nortriptyline 30mg at bedtime- H, SE, dry mouth but manageable    Current MME: 0    Review of [place] Prescription Monitoring Program (): No concern for abuse or misuse of controlled medications based on this report.     Annual Controlled Substance Agreement/UDS due date: NA    Past pain treatments:  1. Previous Pain Relevant Medications:              Opiates:  Vicodin- SE, nausea/vomiting, \"no I don't want it\"               NSAIDS: ibuprofen- NH, naproxen- H              Muscle Relaxants: Flexeril- [facility]/?              Anti-migraine mediations: no              Anti-depressants: nortriptyline- HI              Sleep aids: no              Anxiolytics: no              Neuropathics: no                       Topicals: no              Other medications not covered above: Tylenol- ?, Prednisone- H     2. Physical Therapy: yes prior to surgery and after- NH, practices occasionally, pain physical therapy- [facility], still utilizes some of the exercises and techniques recommended  3. Pain Psychology: yes Meg Hammer PsyD - [facility] - 'I don't think I need that.'  4. Surgery:  C5-6 discectomy and fusion with Dr. Jose at [facility] in March of 2003  5. Injections:    trigger point injections with me (with steroid) on 7/7/2022- H, about 75%     trigger point injections with me on 5/24/2022- H    trigger point injections with Dr. Gross on 4/23/2021- " [facility]    repeat bilateral occipital nerve blocks (with steroid) with me on 8/25/2020- [facility]    bilateral TON, C3,4 medial branch blocks #1 with Dr. Gross on 8/10/2020- NH, W for a few days    bilateral occipital nerve blocks and trigger point injections with me on 6/1/2020- [facility]/H for about 8 weeks, less so than previous occipital nerve blocks     trigger point injections with me on 1/9/2020- H for 2-3 months    bilateral occipital nerve block with Dr. Gross on 11/1/19- H!!!! 8-9 weeks    trigger point injections with me on 9/23/19- H for neck pain, continue to have some benefit    trigger point injections with me 8/23/19 - H for 2 weeks neck pain and headaches, 2.5 weeks tops  6. Chiropractic: yes, regular care - H!   7. Acupuncture: yes - H  8. TENS Unit: no       Medications:  Current Outpatient Medications   Medication Sig Dispense Refill     acetaminophen (TYLENOL) 500 MG tablet Take 500-1,000 mg by mouth every 6 hours as needed for mild pain (Patient not taking: Reported on 7/7/2022)       CITRACAL/VITAMIN D PO 2 a day       estradiol (ESTRACE) 0.1 MG/GM vaginal cream PLACE 2 GRAMS VAGINALLY 3 TIMES A WEEK 42.5 g prn     ibuprofen (ADVIL/MOTRIN) 200 MG tablet Take 200 mg by mouth every 4 hours as needed for mild pain       nortriptyline (PAMELOR) 50 MG capsule Take 1 capsule (50 mg) by mouth At Bedtime 30 capsule 1       Medical History: any changes in medical history since they were last seen? No    Review of Systems: A 10-point review of systems was negative, with the exception of chronic pain issues.     Objective:    Physical Exam:  Blood pressure 105/73, pulse 88, last menstrual period 06/20/2008, SpO2 98 %, not currently breastfeeding.  Constitutional: Well developed, well nourished, appears stated age.  Gait: Normal  HEENT: Head atraumatic, normocephalic. Eyes without conjunctival injection or jaundice. Oropharynx clear. Neck supple. No obvious neck masses.  Skin: No rash, lesions, or petechiae of exposed  skin.   Psychiatric/mental status: Alert, without lethargy or stupor. Speech fluent. Appropriate affect. Mood normal. Able to follow commands without difficulty.     Imaging:  MRI of cervial spine was completed on 10/2019 and shows:         BILLING TIME DOCUMENTATION:   The total TIME spent on this patient on the date of the encounter/appointment was 20 minutes.      TOTAL TIME includes:   Time spent preparing to see the patient (reviewing records and tests)   Time spent face to face (or over the phone) with the patient   Time spent ordering tests, medications, procedures and referrals   Time spent Referring and communicating with other healthcare professionals   Time spent documenting clinical information in Epic

## 2022-08-01 ENCOUNTER — OFFICE VISIT (OUTPATIENT)
Dept: PALLIATIVE MEDICINE | Facility: CLINIC | Age: 62
End: 2022-08-01
Payer: COMMERCIAL

## 2022-08-01 VITALS — DIASTOLIC BLOOD PRESSURE: 73 MMHG | SYSTOLIC BLOOD PRESSURE: 105 MMHG | OXYGEN SATURATION: 98 % | HEART RATE: 88 BPM

## 2022-08-01 DIAGNOSIS — M79.18 MYOFASCIAL PAIN: Primary | ICD-10-CM

## 2022-08-01 DIAGNOSIS — G44.209 TENSION HEADACHE: ICD-10-CM

## 2022-08-01 PROCEDURE — 99213 OFFICE O/P EST LOW 20 MIN: CPT | Performed by: NURSE PRACTITIONER

## 2022-08-01 RX ORDER — NORTRIPTYLINE HCL 10 MG
40 CAPSULE ORAL AT BEDTIME
Qty: 120 CAPSULE | Refills: 1 | Status: SHIPPED | OUTPATIENT
Start: 2022-08-01 | End: 2022-09-23

## 2022-08-01 ASSESSMENT — PAIN SCALES - GENERAL: PAINLEVEL: NO PAIN (1)

## 2022-08-01 NOTE — PATIENT INSTRUCTIONS
1.  Pain Physical Therapy:              Continue to practice gentle stretches and exercises on a regular basis with extra self care.               2.  Pain Psychologist to address relaxation, behavioral change, coping style, and other factors important to improvement.  NO              3.  Medication Management:                           1. Continue nortriptyline 30mg at bedtime. If you are noticing worsening pain for a couple of days or more at a time, increase to 40mg at bedtime.                           2. Okay to continue Tylenol as needed.               4.  Potential procedures: repeat trigger point injections ordered today to be completed in early October. If needed, reach out to the clinic sooner to have these done- without steroid.               5.  Referrals: restart with chiropractor and acupuncture as you feel would be helpful.               6.  Follow up with NANCY Henderson CNP in October.     ----------------------------------------------------------------  Clinic Number:  [phone]   Call with any questions about your care and for scheduling assistance.   Calls are returned Monday through Friday between 8 AM and 4:30 PM. We usually get back to you within 2 business days depending on the issue/request.    If we are prescribing your medications:  For opioid medication refills, call the clinic or send a [employer] message 7 days in advance.  Please include:  Name of requested medication  Name of the pharmacy.  For non-opioid medications, call your pharmacy directly to request a refill. Please allow 3-4 days to be processed.   Per [place] State Law:  All controlled substance prescriptions must be filled within 30 days of being written.    For those controlled substances allowing refills, pickup must occur within 30 days of last fill.      We believe regular attendance is key to your success in our program!    Any time you are unable to keep your appointment we ask that you call us at least 24  hours in advance to cancel.This will allow us to offer the appointment time to another patient.   Multiple missed appointments may lead to dismissal from the clinic.

## 2022-09-20 ASSESSMENT — ENCOUNTER SYMPTOMS
SINUS CONGESTION: 0
MUSCLE WEAKNESS: 0
SORE THROAT: 0
FLANK PAIN: 0
DOUBLE VISION: 0
NECK MASS: 0
SINUS PAIN: 0
TASTE DISTURBANCE: 0
BACK PAIN: 0
MUSCLE CRAMPS: 0
NECK PAIN: 1
HEMATURIA: 0
EYE PAIN: 0
MYALGIAS: 0
DYSURIA: 0
JOINT SWELLING: 0
EYE REDNESS: 0
HOARSE VOICE: 0
ARTHRALGIAS: 0
SMELL DISTURBANCE: 0
EYE WATERING: 1
DIFFICULTY URINATING: 0
EYE IRRITATION: 0
TROUBLE SWALLOWING: 0
STIFFNESS: 1

## 2022-09-20 ASSESSMENT — PAIN SCALES - PAIN ENJOYMENT GENERAL ACTIVITY SCALE (PEG)
INTERFERED_GENERAL_ACTIVITY: 2
PEG_TOTALSCORE: 2
AVG_PAIN_PASTWEEK: 2
PEG_TOTALSCORE: 2
INTERFERED_GENERAL_ACTIVITY: 2
INTERFERED_ENJOYMENT_LIFE: 2
AVG_PAIN_PASTWEEK: 2
INTERFERED_ENJOYMENT_LIFE: 2

## 2022-09-20 ASSESSMENT — ANXIETY QUESTIONNAIRES
2. NOT BEING ABLE TO STOP OR CONTROL WORRYING: NOT AT ALL
5. BEING SO RESTLESS THAT IT IS HARD TO SIT STILL: NOT AT ALL
3. WORRYING TOO MUCH ABOUT DIFFERENT THINGS: NOT AT ALL
GAD7 TOTAL SCORE: 0
7. FEELING AFRAID AS IF SOMETHING AWFUL MIGHT HAPPEN: NOT AT ALL
GAD7 TOTAL SCORE: 0
1. FEELING NERVOUS, ANXIOUS, OR ON EDGE: NOT AT ALL
4. TROUBLE RELAXING: NOT AT ALL
7. FEELING AFRAID AS IF SOMETHING AWFUL MIGHT HAPPEN: NOT AT ALL
6. BECOMING EASILY ANNOYED OR IRRITABLE: NOT AT ALL
GAD7 TOTAL SCORE: 0

## 2022-09-22 ENCOUNTER — IMMUNIZATION (OUTPATIENT)
Dept: PEDIATRICS | Facility: CLINIC | Age: 62
End: 2022-09-22
Payer: COMMERCIAL

## 2022-09-22 DIAGNOSIS — Z23 NEED FOR PROPHYLACTIC VACCINATION AND INOCULATION AGAINST INFLUENZA: Primary | ICD-10-CM

## 2022-09-22 PROCEDURE — 90471 IMMUNIZATION ADMIN: CPT

## 2022-09-22 PROCEDURE — 90682 RIV4 VACC RECOMBINANT DNA IM: CPT

## 2022-09-22 PROCEDURE — 99207 PR NO CHARGE NURSE ONLY: CPT

## 2022-09-22 NOTE — PROGRESS NOTES
[facility]     Date of visit: 9/23/2022      Assessment:   Becky Lay is a 62 year old female with a past medical history significant for hyperhidrosis who presents with complaints of neck pain.      1. Neck pain- etiology likely combination of facet arthropathy and myofascial pain, s/p C5-6 discectomy and fusion with Dr. Jose at [facility] in March of 2003.  2. Headaches- etiology likely combination of occipital neuralgia and tension headaches. Myofascial pain    3. Mental Health - the patient's mental health concerns, specifically situational depression, affect her experience of pain and contribute to her clinically significant distress.      Visit Diagnoses:  1. Myofascial pain    2. Tension headache        Plan:               1.  Pain Physical Therapy:              Continue to practice gentle stretches and exercises on a regular basis with extra self care.               2.  Pain Psychologist to address relaxation, behavioral change, coping style, and other factors important to improvement.  NO              3.  Medication Management:                           1. Nortriptyline continues to be helpful in headache prevention and neck pain. She would like to be on the lowest effective dose. Continue nortriptyline 30mg at bedtime. If noticing worsening pain for a couple of days or more at a time, okay to increase to 40mg at bedtime for 1 week.                           2. Okay to continue Tylenol as needed.     3.  May consider additional medication options in the future if needed.               4.  Potential procedures: we will plan to repeat trigger point injections in early October prior to cruise if needed- ordered at last visit.              5.  Referrals: continue with chiropractor and acupuncture with Kleber Friedman DC . This has been quite helpful addition.               6.  I will be leaving [facility] pain management and my last day will be on 10/19. Plan will  be to follow up with Ursula CRUZ CNP in December.     Jacquelyn CRUZ CNP  [facility]     -------------------------------------------------    Subjective:    Chief complaint:   Chief Complaint   Patient presents with     Pain       Interval history:  Becky Lay is a 62 year old female last seen on 8/1/2022.  she is seen in follow up.     Recommendations/plan at the last visit included:             1.  Pain Physical Therapy:              Continue to practice gentle stretches and exercises on a regular basis with extra self care.               2.  Pain Psychologist to address relaxation, behavioral change, coping style, and other factors important to improvement.  NO              3.  Medication Management:                           1. Nortriptyline continues to be helpful in headache prevention and neck pain. She would like to be on the lowest effective dose. We will continue nortriptyline 30mg at bedtime. If she is noticing worsening pain for a couple of days or more at a time, advised she increase to 40mg at bedtime.                           2. Okay to continue Tylenol as needed.               4.  Potential procedures: repeat trigger point injections ordered today to be completed in early October prior to cruise. If needed, advised she reach out to the clinic sooner to have these done- but without steroid.               5.  Referrals: restart with chiropractor and acupuncture as she feels would be helpful.               6.  Follow up with NANCY Henderson CNP in October.     Since her last visit, Becky Lay reports:  -Her pain is about the same as it was at last visit.   -She started chiropractic care and acupuncture with Kleber Friedman DC , has had regular visits for the last 2 months. She finds this quite helpful, more so than any other chiropractor she has seen before. She appreciates the posture recommendations, diet and po intake recommendations. States,  "\"shes been wonderful.\"   -She continues nortriptyline, typically taking 30mg at bedtime. Occasionally with an increase in pain she will take 40mg, maybe three times since last visit.   -Since she has been feeling better she has been able to entertain more at her home, has had x5 couples come to her home this week.   -She continues regular physical activity as able.   -She continues to report benefit from trigger point injections in July, would like to repeat before trip.   -She has a plan to go on a cruise with her  in October to [place] and the [facility].       Pain Information:   Pain rating: averages 2/10 on a 0-10 scale.      Current pain medications:    Nortriptyline 30mg at bedtime- H, SE, dry mouth but manageable, occasionally taking 40mg    Current MME: 0    Review of [place] Prescription Monitoring Program (): No concern for abuse or misuse of controlled medications based on this report.     Annual Controlled Substance Agreement/UDS due date: NA    Past pain treatments:  1. Previous Pain Relevant Medications:              Opiates:  Vicodin- SE, nausea/vomiting, \"no I don't want it\"               NSAIDS: ibuprofen- [facility], naproxen- H              Muscle Relaxants: Flexeril- [facility]/? \"it just wasn't working anymore\"               Anti-migraine mediations: no              Anti-depressants: nortriptyline- H              Sleep aids: no              Anxiolytics: no              Neuropathics: no                       Topicals: no              Other medications not covered above: Tylenol- ?, Prednisone- H     2. Physical Therapy: yes prior to surgery and after- [facility], practices occasionally, pain physical therapy- [facility], still utilizes some of the exercises and techniques recommended  3. [facility] 'I don't think I need that.'  4. Surgery:  C5-6 discectomy and fusion with Dr. Jose at [facility] in March of 2003  5. Injections:    trigger point injections " with me (with steroid) on 7/7/2022- H, about 75%     trigger point injections with me on 5/24/2022- H    trigger point injections with Dr. Gross on 4/23/2021- [facility]    repeat bilateral occipital nerve blocks (with steroid) with me on 8/25/2020- [facility]    bilateral TON, C3,4 medial branch blocks #1 with Dr. Gross on 8/10/2020- [facility], W for a few days    bilateral occipital nerve blocks and trigger point injections with me on 6/1/2020- H/H for about 8 weeks, less so than previous occipital nerve blocks     trigger point injections with me on 1/9/2020- H for 2-3 months    bilateral occipital nerve block with Dr. Gross on 11/1/19- H!!!! 8-9 weeks    trigger point injections with me on 9/23/19- H for neck pain, continue to have some benefit    trigger point injections with me 8/23/19 - H for 2 weeks neck pain and headaches, 2.5 weeks tops  6. Chiropractic: yes, regular care - H!   7. Acupuncture: yes - H  8. TENS Unit: no       Medications:  Current Outpatient Medications   Medication Sig Dispense Refill     CITRACAL/VITAMIN D PO 2 a day       estradiol (ESTRACE) 0.1 MG/GM vaginal cream PLACE 2 GRAMS VAGINALLY 3 TIMES A WEEK 42.5 g prn     ibuprofen (ADVIL/MOTRIN) 200 MG tablet Take 200 mg by mouth every 4 hours as needed for mild pain       nortriptyline (PAMELOR) 10 MG capsule Take 4 capsules (40 mg) by mouth At Bedtime 120 capsule 3     acetaminophen (TYLENOL) 500 MG tablet Take 500-1,000 mg by mouth every 6 hours as needed for mild pain (Patient not taking: No sig reported)         Medical History: any changes in medical history since they were last seen? No    Review of Systems: A 10-point review of systems was negative, with the exception of chronic pain issues.     Objective:    Physical Exam:  Blood pressure 118/83, pulse 78, last menstrual period 06/20/2008, not currently breastfeeding.  Constitutional: Well developed, well nourished, appears stated age.  Gait: Normal  HEENT: Head atraumatic, normocephalic. Eyes  without conjunctival injection or jaundice. Oropharynx clear. Neck supple. No obvious neck masses.  Skin: No rash, lesions, or petechiae of exposed skin.   Psychiatric/mental status: Alert, without lethargy or stupor. Speech fluent. Appropriate affect. Mood normal. Able to follow commands without difficulty.     Imaging:  MRI of cervial spine was completed on 10/2019 and shows:         BILLING TIME DOCUMENTATION:   The total TIME spent on this patient on the date of the encounter/appointment was 23  minutes.      TOTAL TIME includes:   Time spent preparing to see the patient (reviewing records and tests)   Time spent face to face (or over the phone) with the patient   Time spent ordering tests, medications, procedures and referrals   Time spent Referring and communicating with other healthcare professionals   Time spent documenting clinical information in Epic           Answers for HPI/ROS submitted by the patient on 9/20/2022  FRANCISCO 7 TOTAL SCORE: 0  General Symptoms: No  Skin Symptoms: No  HENT Symptoms: Yes  EYE SYMPTOMS: Yes  HEART SYMPTOMS: No  LUNG SYMPTOMS: No  INTESTINAL SYMPTOMS: No  URINARY SYMPTOMS: Yes  GYNECOLOGIC SYMPTOMS: No  BREAST SYMPTOMS: No  SKELETAL SYMPTOMS: Yes  BLOOD SYMPTOMS: No  NERVOUS SYSTEM SYMPTOMS: No  MENTAL HEALTH SYMPTOMS: No  Ear pain: No  Ear discharge: No  Hearing loss: No  Tinnitus: No  Nosebleeds: No  Congestion: No  Sinus pain: No  Trouble swallowing: No   Voice hoarseness: No  Mouth sores: No  Sore throat: No  Tooth pain: Yes  Gum tenderness: No  Bleeding gums: No  Change in taste: No  Change in sense of smell: No  Dry mouth: No  Hearing aid used: No  Neck lump: No  Eye pain: No  Vision loss: No  Dry eyes: Yes  Watery eyes: Yes  Eye bulging: No  Double vision: No  Flashing of lights: No  Spots: No  Floaters: No  Redness: No  Crossed eyes: No  Tunnel Vision: No  Yellowing of eyes: No  Eye irritation: No  Trouble holding urine or incontinence: No  Pain or burning: No  Trouble  starting or stopping: No  Increased frequency of urination: No  Blood in urine: No  Decreased frequency of urination: No  Frequent nighttime urination: Yes  Flank pain: No  Difficulty emptying bladder: No  Back pain: No  Muscle aches: No  Neck pain: Yes  Swollen joints: No  Joint pain: No  Bone pain: No  Muscle cramps: No  Muscle weakness: No  Joint stiffness: Yes  Bone fracture: No

## 2022-09-23 ENCOUNTER — OFFICE VISIT (OUTPATIENT)
Dept: PALLIATIVE MEDICINE | Facility: CLINIC | Age: 62
End: 2022-09-23
Payer: COMMERCIAL

## 2022-09-23 VITALS — SYSTOLIC BLOOD PRESSURE: 118 MMHG | DIASTOLIC BLOOD PRESSURE: 83 MMHG | HEART RATE: 78 BPM

## 2022-09-23 DIAGNOSIS — G44.209 TENSION HEADACHE: ICD-10-CM

## 2022-09-23 DIAGNOSIS — M79.18 MYOFASCIAL PAIN: Primary | ICD-10-CM

## 2022-09-23 PROCEDURE — 99213 OFFICE O/P EST LOW 20 MIN: CPT | Performed by: NURSE PRACTITIONER

## 2022-09-23 RX ORDER — NORTRIPTYLINE HCL 10 MG
40 CAPSULE ORAL AT BEDTIME
Qty: 120 CAPSULE | Refills: 3 | Status: SHIPPED | OUTPATIENT
Start: 2022-09-23 | End: 2023-04-07

## 2022-09-23 ASSESSMENT — PAIN SCALES - GENERAL: PAINLEVEL: MILD PAIN (2)

## 2022-09-23 NOTE — PROGRESS NOTES
09/23/22 0913   PEG: A Thee-Item Scale Assessing Pain Intensity and Interference        0 = No pain / No interference    10 = Pain as bad as you can imagine / Completely interferes   What number best describes your pain on average in the past week? 4   What number best describes how, during the past week, pain has interfered with your enjoyment of life? 4   What number best describes how, during the past week, pain has interfered with your general activity? 4   PEG Total Score 4

## 2022-09-23 NOTE — PATIENT INSTRUCTIONS
1.  Pain Physical Therapy:              Continue to practice gentle stretches and exercises on a regular basis with extra self care.               2.  Pain Psychologist to address relaxation, behavioral change, coping style, and other factors important to improvement.  NO              3.  Medication Management:                           1. Continue nortriptyline 30mg at bedtime. If noticing worsening pain for a couple of days or more at a time,  increase to 40mg at bedtime for 1 week.                           2. Okay to continue Tylenol as needed.     3.  May consider additional medication options in the future if needed.               4.  Potential procedures: we will plan to repeat trigger point injections in early October prior to cruise. If needed.              5.  Referrals: continue with chiropractor and acupuncture.              6.  I will be leaving [facility] pain management and my last day will be on 10/19. Plan will be to follow up with Ursula CRUZ CNP in December.       ----------------------------------------------------------------  Clinic Number:  [phone]   Call with any questions about your care and for scheduling assistance.   Calls are returned Monday through Friday between 8 AM and 4:30 PM. We usually get back to you within 2 business days depending on the issue/request.    If we are prescribing your medications:  For opioid medication refills, call the clinic or send a Mendor message 7 days in advance.  Please include:  Name of requested medication  Name of the pharmacy.  For non-opioid medications, call your pharmacy directly to request a refill. Please allow 3-4 days to be processed.   Per [place] State Law:  All controlled substance prescriptions must be filled within 30 days of being written.    For those controlled substances allowing refills, pickup must occur within 30 days of last fill.      We believe regular attendance is key to your success in our program!     Any time you are unable to keep your appointment we ask that you call us at least 24 hours in advance to cancel.This will allow us to offer the appointment time to another patient.   Multiple missed appointments may lead to dismissal from the clinic.

## 2023-03-26 ENCOUNTER — HEALTH MAINTENANCE LETTER (OUTPATIENT)
Age: 63
End: 2023-03-26

## 2023-04-07 DIAGNOSIS — M79.18 MYOFASCIAL PAIN: ICD-10-CM

## 2023-04-07 DIAGNOSIS — G44.209 TENSION HEADACHE: ICD-10-CM

## 2023-04-07 RX ORDER — NORTRIPTYLINE HCL 10 MG
40 CAPSULE ORAL AT BEDTIME
Qty: 120 CAPSULE | Refills: 3 | Status: SHIPPED | OUTPATIENT
Start: 2023-04-07 | End: 2023-05-05

## 2023-04-07 NOTE — TELEPHONE ENCOUNTER
Received fax request from [facility] pharmacy requesting refill(s) for nortriptyline (PAMELOR) 10 MG capsule    Last refilled on 12/29/22    Pt last seen on 09/23/22-Ondina  Next appt scheduled for None    Will facilitate refill.

## 2023-04-14 ENCOUNTER — ANCILLARY PROCEDURE (OUTPATIENT)
Dept: MAMMOGRAPHY | Facility: CLINIC | Age: 63
End: 2023-04-14
Attending: NURSE PRACTITIONER
Payer: COMMERCIAL

## 2023-04-14 DIAGNOSIS — Z12.31 VISIT FOR SCREENING MAMMOGRAM: ICD-10-CM

## 2023-04-14 PROCEDURE — 77067 SCR MAMMO BI INCL CAD: CPT | Mod: TC | Performed by: RADIOLOGY

## 2023-04-14 PROCEDURE — 77063 BREAST TOMOSYNTHESIS BI: CPT | Mod: TC | Performed by: RADIOLOGY

## 2023-05-04 SDOH — ECONOMIC STABILITY: INCOME INSECURITY: HOW HARD IS IT FOR YOU TO PAY FOR THE VERY BASICS LIKE FOOD, HOUSING, MEDICAL CARE, AND HEATING?: NOT HARD AT ALL

## 2023-05-04 SDOH — ECONOMIC STABILITY: FOOD INSECURITY: WITHIN THE PAST 12 MONTHS, THE FOOD YOU BOUGHT JUST DIDN'T LAST AND YOU DIDN'T HAVE MONEY TO GET MORE.: NEVER TRUE

## 2023-05-04 SDOH — HEALTH STABILITY: PHYSICAL HEALTH: ON AVERAGE, HOW MANY DAYS PER WEEK DO YOU ENGAGE IN MODERATE TO STRENUOUS EXERCISE (LIKE A BRISK WALK)?: 3 DAYS

## 2023-05-04 SDOH — HEALTH STABILITY: PHYSICAL HEALTH: ON AVERAGE, HOW MANY MINUTES DO YOU ENGAGE IN EXERCISE AT THIS LEVEL?: 40 MIN

## 2023-05-04 SDOH — ECONOMIC STABILITY: FOOD INSECURITY: WITHIN THE PAST 12 MONTHS, YOU WORRIED THAT YOUR FOOD WOULD RUN OUT BEFORE YOU GOT MONEY TO BUY MORE.: NEVER TRUE

## 2023-05-04 SDOH — ECONOMIC STABILITY: INCOME INSECURITY: IN THE LAST 12 MONTHS, WAS THERE A TIME WHEN YOU WERE NOT ABLE TO PAY THE MORTGAGE OR RENT ON TIME?: NO

## 2023-05-04 ASSESSMENT — SOCIAL DETERMINANTS OF HEALTH (SDOH)
IN A TYPICAL WEEK, HOW MANY TIMES DO YOU TALK ON THE PHONE WITH FAMILY, FRIENDS, OR NEIGHBORS?: MORE THAN THREE TIMES A WEEK
HOW OFTEN DO YOU ATTEND CHURCH OR RELIGIOUS SERVICES?: MORE THAN 4 TIMES PER YEAR
DO YOU BELONG TO ANY CLUBS OR ORGANIZATIONS SUCH AS CHURCH GROUPS UNIONS, FRATERNAL OR ATHLETIC GROUPS, OR SCHOOL GROUPS?: YES
HOW OFTEN DO YOU GET TOGETHER WITH FRIENDS OR RELATIVES?: TWICE A WEEK

## 2023-05-04 ASSESSMENT — ENCOUNTER SYMPTOMS
DIZZINESS: 0
ABDOMINAL PAIN: 0
NERVOUS/ANXIOUS: 0
HEMATOCHEZIA: 0
SHORTNESS OF BREATH: 0
COUGH: 0
FEVER: 0
DIARRHEA: 0
EYE PAIN: 0
WEAKNESS: 0
DYSURIA: 0
HEADACHES: 0
SORE THROAT: 0
HEMATURIA: 0
HEARTBURN: 0
BREAST MASS: 0
ARTHRALGIAS: 0
CHILLS: 0
PARESTHESIAS: 0
CONSTIPATION: 0
PALPITATIONS: 0
JOINT SWELLING: 0
NAUSEA: 0
MYALGIAS: 0
FREQUENCY: 0

## 2023-05-04 ASSESSMENT — LIFESTYLE VARIABLES
SKIP TO QUESTIONS 9-10: 1
HOW MANY STANDARD DRINKS CONTAINING ALCOHOL DO YOU HAVE ON A TYPICAL DAY: 1 OR 2
AUDIT-C TOTAL SCORE: 1
HOW OFTEN DO YOU HAVE SIX OR MORE DRINKS ON ONE OCCASION: NEVER
HOW OFTEN DO YOU HAVE A DRINK CONTAINING ALCOHOL: MONTHLY OR LESS

## 2023-05-05 ENCOUNTER — ANCILLARY PROCEDURE (OUTPATIENT)
Dept: BONE DENSITY | Facility: CLINIC | Age: 63
End: 2023-05-05
Attending: NURSE PRACTITIONER
Payer: COMMERCIAL

## 2023-05-05 ENCOUNTER — OFFICE VISIT (OUTPATIENT)
Dept: PEDIATRICS | Facility: CLINIC | Age: 63
End: 2023-05-05
Payer: COMMERCIAL

## 2023-05-05 VITALS
DIASTOLIC BLOOD PRESSURE: 70 MMHG | TEMPERATURE: 97.3 F | HEART RATE: 76 BPM | OXYGEN SATURATION: 97 % | HEIGHT: 65 IN | SYSTOLIC BLOOD PRESSURE: 140 MMHG | RESPIRATION RATE: 16 BRPM | BODY MASS INDEX: 20.88 KG/M2 | WEIGHT: 125.3 LBS

## 2023-05-05 DIAGNOSIS — E03.8 SUBCLINICAL HYPOTHYROIDISM: ICD-10-CM

## 2023-05-05 DIAGNOSIS — Z00.00 ROUTINE GENERAL MEDICAL EXAMINATION AT A HEALTH CARE FACILITY: Primary | ICD-10-CM

## 2023-05-05 DIAGNOSIS — Z86.0100 HISTORY OF COLONIC POLYPS: ICD-10-CM

## 2023-05-05 DIAGNOSIS — M85.80 OSTEOPENIA, UNSPECIFIED LOCATION: ICD-10-CM

## 2023-05-05 DIAGNOSIS — M79.18 MYOFASCIAL PAIN: ICD-10-CM

## 2023-05-05 DIAGNOSIS — G44.209 TENSION HEADACHE: ICD-10-CM

## 2023-05-05 DIAGNOSIS — Z12.11 COLON CANCER SCREENING: ICD-10-CM

## 2023-05-05 DIAGNOSIS — N95.2 POST-MENOPAUSAL ATROPHIC VAGINITIS: ICD-10-CM

## 2023-05-05 DIAGNOSIS — R03.0 ELEVATED BLOOD PRESSURE READING WITHOUT DIAGNOSIS OF HYPERTENSION: ICD-10-CM

## 2023-05-05 LAB
CHOLEST SERPL-MCNC: 243 MG/DL
FASTING STATUS PATIENT QL REPORTED: YES
GLUCOSE SERPL-MCNC: 94 MG/DL (ref 70–99)
HDLC SERPL-MCNC: 74 MG/DL
LDLC SERPL CALC-MCNC: 147 MG/DL
NONHDLC SERPL-MCNC: 169 MG/DL
TRIGL SERPL-MCNC: 111 MG/DL
TSH SERPL DL<=0.005 MIU/L-ACNC: 2.99 UIU/ML (ref 0.3–4.2)

## 2023-05-05 PROCEDURE — 77080 DXA BONE DENSITY AXIAL: CPT | Performed by: INTERNAL MEDICINE

## 2023-05-05 PROCEDURE — 80061 LIPID PANEL: CPT | Performed by: NURSE PRACTITIONER

## 2023-05-05 PROCEDURE — 82947 ASSAY GLUCOSE BLOOD QUANT: CPT | Performed by: NURSE PRACTITIONER

## 2023-05-05 PROCEDURE — 36415 COLL VENOUS BLD VENIPUNCTURE: CPT | Performed by: NURSE PRACTITIONER

## 2023-05-05 PROCEDURE — 99396 PREV VISIT EST AGE 40-64: CPT | Performed by: NURSE PRACTITIONER

## 2023-05-05 PROCEDURE — 84443 ASSAY THYROID STIM HORMONE: CPT | Performed by: NURSE PRACTITIONER

## 2023-05-05 PROCEDURE — 99214 OFFICE O/P EST MOD 30 MIN: CPT | Mod: 25 | Performed by: NURSE PRACTITIONER

## 2023-05-05 RX ORDER — NORTRIPTYLINE HCL 10 MG
30 CAPSULE ORAL AT BEDTIME
Qty: 120 CAPSULE | Refills: 3 | COMMUNITY
Start: 2023-05-05 | End: 2023-05-15

## 2023-05-05 RX ORDER — ESTRADIOL 0.1 MG/G
CREAM VAGINAL
Qty: 42.5 G | Status: SHIPPED | OUTPATIENT
Start: 2023-05-05 | End: 2023-06-08

## 2023-05-05 ASSESSMENT — ENCOUNTER SYMPTOMS
CONSTIPATION: 0
HEADACHES: 0
MYALGIAS: 0
CHILLS: 0
FEVER: 0
ARTHRALGIAS: 0
ABDOMINAL PAIN: 0
SORE THROAT: 0
DIARRHEA: 0
DIZZINESS: 0
NAUSEA: 0
EYE PAIN: 0
HEARTBURN: 0
HEMATOCHEZIA: 0
PALPITATIONS: 0
HEMATURIA: 0
JOINT SWELLING: 0
COUGH: 0
NERVOUS/ANXIOUS: 0
WEAKNESS: 0
FREQUENCY: 0
SHORTNESS OF BREATH: 0
BREAST MASS: 0
PARESTHESIAS: 0
DYSURIA: 0

## 2023-05-05 ASSESSMENT — PAIN SCALES - GENERAL: PAINLEVEL: NO PAIN (0)

## 2023-05-05 NOTE — PROGRESS NOTES
SUBJECTIVE:   CC: Becky Montenegro is an 62 year old who presents for preventive health visit.   Patient has been advised of split billing requirements and indicates understanding: Yes     Healthy Habits:     Getting at least 3 servings of Calcium per day:  Yes    Bi-annual eye exam:  Yes    Dental care twice a year:  Yes    Sleep apnea or symptoms of sleep apnea:  None    Diet:  Regular (no restrictions)    Frequency of exercise:  2-3 days/week    Duration of exercise:  30-45 minutes    Taking medications regularly:  Yes    PHQ-2 Total Score: 0    Additional concerns today:  No    Pap - utd  Mammo - utd  Colonoscopy - due     Today's PHQ-2 Score:       5/4/2023    10:42 AM   PHQ-2 ( 1999 [employer])   Q1: Little interest or pleasure in doing things 0   Q2: Feeling down, depressed or hopeless 0   PHQ-2 Score 0   Q1: Little interest or pleasure in doing things Not at all    Not at all   Q2: Feeling down, depressed or hopeless Not at all    Not at all   PHQ-2 Score 0    0       Social History     Tobacco Use     Smoking status: Never     Smokeless tobacco: Never   Vaping Use     Vaping status: Never Used   Substance Use Topics     Alcohol use: Not Currently     Alcohol/week: 10.0 standard drinks of alcohol     Comment: one glass per week             5/4/2023    10:42 AM   Alcohol Use   Prescreen: >3 drinks/day or >7 drinks/week? No     Reviewed orders with patient.  Reviewed health maintenance and updated orders accordingly - Yes  BP Readings from Last 3 Encounters:   05/05/23 (!) 140/70   09/23/22 118/83   08/01/22 105/73    Wt Readings from Last 3 Encounters:   05/05/23 56.8 kg (125 lb 4.8 oz)   06/23/22 59.5 kg (131 lb 1.6 oz)   03/21/22 59.2 kg (130 lb 9.6 oz)           Breast Cancer Screening:        3/18/2022     9:02 AM   Breast CA Risk Assessment (FHS-7)   Do you have a family history of breast, colon, or ovarian cancer? No / Unknown       Mammogram Screening: Recommended mammography every 1-2 years with patient  discussion and risk factor consideration  Pertinent mammograms are reviewed under the imaging tab.    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, 6, 7 DJD     Other and unspecified malignant neoplasm of skin of other and unspecified parts of face     Generalized hyperhidrosis     CARDIOVASCULAR SCREENING; LDL GOAL LESS THAN 160     Menopausal syndrome (hot flashes)     Cervicalgia     PMB (postmenopausal bleeding)     Bilateral occipital neuralgia     Past Medical History:   Diagnosis Date     DJD C56,7      Past Surgical History:   Procedure Laterality Date     HEAD & NECK SURGERY  3/2003    Discectomy and fusion     ZZC VASQUES W/O FACETEC FORAMOT/DSKC 1/2 VRT SEG, CERVICAL  2004    anterior c- 5, 6 fusion     Family History   Problem Relation Age of Onset     Osteoporosis Mother      Lipids Mother      Dementia Mother      Heart Disease Father         MI age 50/BP and Chol Meds     Hyperlipidemia Father      Cancer Maternal Grandmother         colon     Colon Cancer Maternal Grandmother      Cancer Maternal Grandfather         lung ca     Social History     Socioeconomic History     Marital status:      Spouse name: Not on file     Number of children: Not on file     Years of education: Not on file     Highest education level: Not on file   Occupational History     Occupation: school nurse     Employer: [employer]   Tobacco Use     Smoking status: Never     Smokeless tobacco: Never    Vaping Use     Vaping status: Never Used   Substance and Sexual Activity     Alcohol use: Yes     Alcohol/week: 10.0 standard drinks of alcohol     Comment: Rarely, a glass of prosecco.     Drug use: No     Sexual activity: Yes     Partners: Male     Birth control/protection: Post-menopausal   Other Topics Concern     Parent/sibling w/ CABG, MI or angioplasty before 65F 55M? Yes     Comment: Father at age 51   Social History Narrative    Retired.     Was a school nurse.     She and her  enjoy walking together and dancing.     Four kids.          Social Determinants of Health     Financial Resource Strain: Low Risk  (5/4/2023)    Overall Financial Resource Strain (CARDIA)      Difficulty of Paying Living Expenses: Not hard at all   Food Insecurity: No Food Insecurity (5/4/2023)    Hunger Vital Sign      Worried About Running Out of Food in the Last Year: Never true      Ran Out of Food in the Last Year: Never true   Transportation Needs: No Transportation Needs (5/4/2023)    PRAPARE - Transportation      Lack of Transportation (Medical): No      Lack of Transportation (Non-Medical): No   Physical Activity: Insufficiently Active (5/4/2023)    Exercise Vital Sign      Days of Exercise per Week: 3 days      Minutes of Exercise per Session: 40 min   Stress: No Stress Concern Present (5/4/2023)    [place] [employer] - Occupational Stress Questionnaire      Feeling of Stress : Only a little   Social Connections: Socially Integrated (5/4/2023)    Social Connection and Isolation Panel [NHANES]      Frequency of Communication with Friends and Family: More than three times a week      Frequency of Social Gatherings with Friends and Family: Twice a week      Attends Sabianist Services: More than 4 times per year      Active Member of Clubs or Organizations: Yes      Attends Club or Organization Meetings: Not on file      Marital Status:    Intimate Partner Violence: Not on file   Housing  "Stability: Low Risk  (5/4/2023)    Housing Stability Vital Sign      Unable to Pay for Housing in the Last Year: No      Number of Places Lived in the Last Year: 1      Unstable Housing in the Last Year: No     Current Outpatient Medications   Medication     acetaminophen (TYLENOL) 500 MG tablet     CITRACAL/VITAMIN D PO     estradiol (ESTRACE) 0.1 MG/GM vaginal cream     ibuprofen (ADVIL/MOTRIN) 200 MG tablet     nortriptyline (PAMELOR) 10 MG capsule     No current facility-administered medications for this visit.        Allergies   Allergen Reactions     No Known Allergies          Review of Systems   Constitutional: Negative for chills and fever.   HENT: Negative for congestion, ear pain, hearing loss and sore throat.    Eyes: Negative for pain and visual disturbance.   Respiratory: Negative for cough and shortness of breath.    Cardiovascular: Negative for chest pain, palpitations and peripheral edema.   Gastrointestinal: Negative for abdominal pain, constipation, diarrhea, heartburn, hematochezia and nausea.   Breasts:  Negative for tenderness, breast mass and discharge.   Genitourinary: Negative for dysuria, frequency, genital sores, hematuria, pelvic pain, urgency, vaginal bleeding and vaginal discharge.   Musculoskeletal: Negative for arthralgias, joint swelling and myalgias.   Skin: Negative for rash.   Neurological: Negative for dizziness, weakness, headaches and paresthesias.   Psychiatric/Behavioral: Negative for mood changes. The patient is not nervous/anxious.      OBJECTIVE:   BP (!) 140/70 (BP Location: Right arm, Patient Position: Sitting, Cuff Size: Adult Regular)   Pulse 76   Temp 97.3  F (36.3  C) (Tympanic)   Resp 16   Ht 1.651 m (5' 5\")   Wt 56.8 kg (125 lb 4.8 oz)   LMP 06/20/2008   SpO2 97%   BMI 20.85 kg/m    Physical Exam  Constitutional: appears to be in no acute distress, comfortable, pleasant.   Eyes: anicteric, conjunctiva clear without drainage or erythema. SHUBHAM.   Ears, Nose " and Throat: tympanic membranes gray with LR,  nose without nasal discharge. OP: no erythema to posterior pharynx, negative post nasal drainage, tonsils +1 no erythema or exudate.  Neck: supple, thyroid palpable,not enlarged, no nodules   Breast: Exam deferred (deferred after discussion of exam options with patient, no symptoms or concerns).   Cardiovascular: regular rate and rhythm, normal S1 and S2, no murmurs, rubs or gallops, peripheral pulses full and symmetric; negative peripheral edema   Respiratory: Air entry throughout. Breathing pattern unlabored without the use of accessory muscles. Clear to auscultation A and P, no wheezes or crackles, normal breath sounds.    Gastrointestinal: rounded, soft. Positive bowel sounds x4, nontender, no masses.   Genitourinary: Exam deferred (deferred after discussion of exam options with patient, no symptoms or concerns, pap is up to date).   Musculoskeletal: full range of motion, no edema.   Skin: pink, turgor smooth and elastic. Negative for lesions or dryness.  Neurological: normal gait, no tremor.   Psychological: appropriate mood and affect.   Lymphatic: no cervical, axillary, supraclavicular, or infraclavicular lymphadenopathy.    Diagnostic Test Results:  Labs reviewed in Epic    ASSESSMENT/PLAN:   (Z00.00) Routine general medical examination at a health care facility  (primary encounter diagnosis)  Age appropriate screening and preventative care have been addressed today. Vaccinations have been reviewed and are up to date. Patient has been advised to follow a balanced diet with adequate calcium and vitamin D. They have been advised to undertake routine aerobic activity. Recommend annual vision exams as well as biannual dental exams. They will follow up for annual physical again in one year.   - Lipid panel reflex to direct LDL Fasting  - Glucose  - Pap - utd  - Mammo - utd  - Colonoscopy - due           (Z12.11) Colon cancer screening  (Z86.010) History of colonic  polyps  Colonoscopy last completed in 2018, tubular adenoma, recommended to repeat in 5 years.   - Colonoscopy Screening  Referral          (M85.80) Osteopenia, unspecified location  Last completed in 2009, showed osteopenia. Was told to repeat in 3 years but this was never completed. Reviewed recommendations for adequate calcium and vitamin D as well as weight bearing activity.  - DX Hip/Pelvis/Spine          (N95.2) Post-menopausal atrophic vaginitis  Chronic, stable. No concerns. Refilled.   - estradiol (ESTRACE) 0.1 MG/GM vaginal cream  - OFFICE/OUTPT VISIT,EST,LEVL IV          (M79.18) Myofascial pain  (G44.209) Tension headache  Chronic, stable. Updated prescription to reflect how she is taking the medication (30 mg daily rather than 40 mg).   - nortriptyline (PAMELOR) 10 MG capsule  - OFFICE/OUTPT VISIT,EST,LEVL IV          (R03.0) Elevated blood pressure reading without diagnosis of hypertension  SBP elevated today. This is new for her. Her  has a cuff. Recommend spot checking at home and if BP is consistently >130/80, she will return for follow-up.     (E03.8) Subclinical hypothyroidism  Recheck TSH for monitoring purposes.   - TSH with free T4 reflex  - OFFICE/OUTPT VISIT,EST,LEVL IV          Follow-up:   - Return for DEXA  - Lab results pending, will follow-up as indicated after reviewing results.       COUNSELING:  Reviewed preventive health counseling, as reflected in patient instructions  Special attention given to:        Regular exercise       Healthy diet/nutrition       Vision screening       Immunizations       Osteoporosis prevention/bone health       Colorectal Cancer Screening       (Trudy)menopause management    She reports that she has never smoked. She has never used smokeless tobacco.           A discussion took place today regarding patient's desire to transition their care to me as their new primary care provider.     NANCY Perkins [facility]  [place]AND[place]

## 2023-05-05 NOTE — PATIENT INSTRUCTIONS
Chica Jean    Preventive Health Recommendations  Female Ages 50 - 64    Yearly exam: See your health care provider every year in order to  Review health changes.   Discuss preventive care.    Review your medicines if your doctor has prescribed any.    Get a Pap test every three years (unless you have an abnormal result and your provider advises testing more often).  If you get Pap tests with HPV test, you only need to test every 5 years, unless you have an abnormal result.   You do not need a Pap test if your uterus was removed (hysterectomy) and you have not had cancer.  You should be tested each year for STDs (sexually transmitted diseases) if you're at risk.   Have a mammogram every 1 to 2 years.  Have a colonoscopy at age 50, or have a yearly FIT test (stool test). These exams screen for colon cancer.    Have a cholesterol test every 5 years, or more often if advised.  Have a diabetes test (fasting glucose) every three years. If you are at risk for diabetes, you should have this test more often.   If you are at risk for osteoporosis (brittle bone disease), think about having a bone density scan (DEXA).    Shots: Get a flu shot each year. Get a tetanus shot every 10 years.    Nutrition:   Eat at least 5 servings of fruits and vegetables each day.  Eat whole-grain bread, whole-wheat pasta and brown rice instead of white grains and rice.  Get adequate Calcium and Vitamin D.     Lifestyle  Exercise at least 150 minutes a week (30 minutes a day, 5 days a week). This will help you control your weight and prevent disease.  Limit alcohol to one drink per day.  No smoking.   Wear sunscreen to prevent skin cancer.   See your dentist every six months for an exam and cleaning.  See your eye doctor every 1 to 2 years.

## 2023-05-07 PROBLEM — M85.851 OSTEOPENIA OF BOTH HIPS: Status: ACTIVE | Noted: 2023-05-07

## 2023-05-07 PROBLEM — M85.852 OSTEOPENIA OF BOTH HIPS: Status: ACTIVE | Noted: 2023-05-07

## 2023-05-13 ENCOUNTER — MYC MEDICAL ADVICE (OUTPATIENT)
Dept: PEDIATRICS | Facility: CLINIC | Age: 63
End: 2023-05-13
Payer: COMMERCIAL

## 2023-05-13 DIAGNOSIS — G44.209 TENSION HEADACHE: ICD-10-CM

## 2023-05-13 DIAGNOSIS — M79.18 MYOFASCIAL PAIN: ICD-10-CM

## 2023-05-15 RX ORDER — NORTRIPTYLINE HCL 10 MG
30 CAPSULE ORAL AT BEDTIME
Qty: 120 CAPSULE | Refills: 3 | Status: SHIPPED | OUTPATIENT
Start: 2023-05-15 | End: 2024-02-15

## 2023-05-15 NOTE — TELEPHONE ENCOUNTER
Routing refill request to provider for review/approval because:  Medication is reported/historical    Wilver RANDLE RN 5/15/2023 at 7:29 AM

## 2023-06-06 DIAGNOSIS — N95.2 POST-MENOPAUSAL ATROPHIC VAGINITIS: ICD-10-CM

## 2023-06-08 RX ORDER — ESTRADIOL 0.1 MG/G
CREAM VAGINAL
Qty: 42.5 G | Refills: 1 | Status: SHIPPED | OUTPATIENT
Start: 2023-06-08 | End: 2024-05-13

## 2023-07-07 ENCOUNTER — ANCILLARY PROCEDURE (OUTPATIENT)
Dept: GENERAL RADIOLOGY | Facility: CLINIC | Age: 63
End: 2023-07-07
Attending: NURSE PRACTITIONER
Payer: COMMERCIAL

## 2023-07-07 ENCOUNTER — OFFICE VISIT (OUTPATIENT)
Dept: PEDIATRICS | Facility: CLINIC | Age: 63
End: 2023-07-07
Payer: COMMERCIAL

## 2023-07-07 VITALS
HEART RATE: 84 BPM | DIASTOLIC BLOOD PRESSURE: 78 MMHG | BODY MASS INDEX: 20.44 KG/M2 | SYSTOLIC BLOOD PRESSURE: 114 MMHG | WEIGHT: 122.7 LBS | OXYGEN SATURATION: 100 % | RESPIRATION RATE: 18 BRPM | TEMPERATURE: 96.9 F | HEIGHT: 65 IN

## 2023-07-07 DIAGNOSIS — R19.5 CHANGE IN STOOL: ICD-10-CM

## 2023-07-07 DIAGNOSIS — K59.00 CONSTIPATION, UNSPECIFIED CONSTIPATION TYPE: ICD-10-CM

## 2023-07-07 DIAGNOSIS — R14.0 BLOATED ABDOMEN: ICD-10-CM

## 2023-07-07 DIAGNOSIS — K59.00 CONSTIPATION, UNSPECIFIED CONSTIPATION TYPE: Primary | ICD-10-CM

## 2023-07-07 PROCEDURE — 99214 OFFICE O/P EST MOD 30 MIN: CPT | Performed by: NURSE PRACTITIONER

## 2023-07-07 PROCEDURE — 74019 RADEX ABDOMEN 2 VIEWS: CPT | Mod: TC | Performed by: RADIOLOGY

## 2023-07-07 ASSESSMENT — PAIN SCALES - GENERAL: PAINLEVEL: NO PAIN (0)

## 2023-07-07 NOTE — PROGRESS NOTES
Assessment & Plan   Bloated abdomen  Change in stool  Constipation, unspecified constipation type  X-ray doesn't show a large stool burden.  Sounds like she was initially constipated, then treated with miralax and now having thinner stools. No weight loss, black stools, bloody stools or pain.  I recommend colonoscopy as next step but she would like to ensure gets done as screening colonoscopy (due 8/6/23). Already has order so will work on getting that scheduled appropriately. If all normal, next steps would be consider OBGYN etiology and proceed with pelvic US  - XR Abdomen 2 Views; Future    See Patient Instructions  Patient Instructions   DAILY  Metamucil (make sure you are doing plenty of fluids with this)    DAILY as needed  Miralax 1 capful 1-2x per day    If really backed up  Can do glycerin suppository (just do it today)    Plenty of fiber and fluids in diet. Stay active.    I will message you the final results of your x-ray                  Tea Cordova NP  [facility]    Adrian   Becky Montenegro is a 62 year old, presenting for the following health issues:  Gastrointestinal Problem        7/7/2023     7:58 AM   Additional Questions   Roomed by Jackson Caro   Accompanied by NA         7/7/2023     7:58 AM   Patient Reported Additional Medications   Patient reports taking the following new medications NO     History of Present Illness       Reason for visit:  GI symptoms  Symptom onset:  3-4 weeks ago  Symptoms include:  Bloating, sluggish bowel, decreased appetite, decrease energy  Symptom intensity:  Moderate  Symptom progression:  Worsening  Had these symptoms before:  Yes  Has tried/received treatment for these symptoms:  No  What makes it worse:  Eating more than a small amount  What makes it better:  Having a bowel movement. Eating small amounts of food.    She eats 4 or more servings of fruits and vegetables daily.She consumes 1 sweetened beverage(s) daily.She exercises with  "enough effort to increase her heart rate 20 to 29 minutes per day.  She exercises with enough effort to increase her heart rate 4 days per week.   She is taking medications regularly.     Has colonoscopy scheduled next week on 7/11/23  Hx of constipation, had work-up like pelvic US and was \"just constipated\"  Last colonoscopy with polyp so due for recheck Aug 2023  Traveled to [place] and then started to have small hard stools. Tried miralax and then not having great results, just thin and soft stools. Did enema with some stool but not a huge amount.  Appetite decreased dt bloating \"feels full\"  Denies any bloody or black stools, no mucus  No weight loss  Denies vag bleeding    Normal TSH 5/5/23      Wt Readings from Last 4 Encounters:   07/07/23 55.7 kg (122 lb 11.2 oz)   05/05/23 56.8 kg (125 lb 4.8 oz)   06/23/22 59.5 kg (131 lb 1.6 oz)   03/21/22 59.2 kg (130 lb 9.6 oz)       Review of Systems         Objective    /78 (BP Location: Right arm, Patient Position: Sitting, Cuff Size: Adult Regular)   Pulse 84   Temp 96.9  F (36.1  C) (Tympanic)   Resp 18   Ht 1.649 m (5' 4.92\")   Wt 55.7 kg (122 lb 11.2 oz)   LMP 06/20/2008   SpO2 100%   BMI 20.47 kg/m    Body mass index is 20.47 kg/m .  Physical Exam   GENERAL: healthy, alert and no distress  ABDOMEN: soft, nontender, no hepatosplenomegaly, no masses and bowel sounds normal                    "

## 2023-07-07 NOTE — PATIENT INSTRUCTIONS
DAILY  Metamucil (make sure you are doing plenty of fluids with this)    DAILY as needed  Miralax 1 capful 1-2x per day    If really backed up  Can do glycerin suppository (just do it today)    Plenty of fiber and fluids in diet. Stay active.    I will message you the final results of your x-ray

## 2023-07-13 ENCOUNTER — TRANSFERRED RECORDS (OUTPATIENT)
Dept: HEALTH INFORMATION MANAGEMENT | Facility: CLINIC | Age: 63
End: 2023-07-13
Payer: COMMERCIAL

## 2023-08-09 ENCOUNTER — VIRTUAL VISIT (OUTPATIENT)
Dept: PEDIATRICS | Facility: CLINIC | Age: 63
End: 2023-08-09
Payer: COMMERCIAL

## 2023-08-09 ENCOUNTER — LAB (OUTPATIENT)
Dept: LAB | Facility: CLINIC | Age: 63
End: 2023-08-09
Payer: COMMERCIAL

## 2023-08-09 DIAGNOSIS — K59.00 CONSTIPATION, UNSPECIFIED CONSTIPATION TYPE: ICD-10-CM

## 2023-08-09 DIAGNOSIS — R14.0 ABDOMINAL BLOATING: ICD-10-CM

## 2023-08-09 DIAGNOSIS — R14.0 ABDOMINAL BLOATING: Primary | ICD-10-CM

## 2023-08-09 PROCEDURE — 82784 ASSAY IGA/IGD/IGG/IGM EACH: CPT

## 2023-08-09 PROCEDURE — 86364 TISS TRNSGLTMNASE EA IG CLAS: CPT

## 2023-08-09 PROCEDURE — 99214 OFFICE O/P EST MOD 30 MIN: CPT | Mod: VID | Performed by: NURSE PRACTITIONER

## 2023-08-09 PROCEDURE — 36415 COLL VENOUS BLD VENIPUNCTURE: CPT

## 2023-08-09 NOTE — PROGRESS NOTES
"Becky Montenegro is a 63 year old who is being evaluated via a billable video visit.      How would you like to obtain your AVS? MyChart  If the video visit is dropped, the invitation should be resent by: Text to cell phone: [phone]  Will anyone else be joining your video visit? No          Assessment & Plan     Abdominal bloating  Constipation, unspecified constipation type  Ongoing. Colonoscopy 7/13, normal. Will pursue trans vag US to look at gyn etiology, rule out ovarian cancer. Rule out celiac sprue. If imaging and labs normal, referral to GI.   - US Pelvic Complete with Transvaginal; Future  - Tissue transglutaminase fracisco IgA and IgG; Future  - IgA; Future  - Adult GI  Referral - Consult Only; Future    20 minutes spent by me on the date of the encounter doing chart review, review of outside records, review of test results, patient visit, and documentation        FURTHER TESTING:       - Trans Vag ultrasound  CONSULTATION/REFERRAL to GI  FUTURE LABS:       - Schedule a non-fasting blood draw at earliest convenience.    NANCY Perkins CNP  [facility] SANDIE    Subjective   Becky Montenegro is a 63 year old, presenting for the following health issues:  Constipation        7/7/2023     7:58 AM   Additional Questions   Roomed by Jackson Caro   Accompanied by PRISCILA BAKER     Presents today for follow-up.     Felt like in June she developed a little belly. Worsening towards end of June. Whenever she eats, her gut \"explodes\" with bloating, located to lower abdomen. She has developed constipation. Early satiety. A couple pound weight loss. Bloating improved after bowel movements. Has been using metamucil. Trying to increase fiber in her diet. One day she had increased urination, seems to be associated with when she is constipated.    Interestingly, about a year ago she eliminated processed foods and added sugar and her constipation resolved completely. She felt great, was able to throw away her " Citrucel. She has not changed her diet since that time, however constipation and abdominal bloating returned as outlined above.    Wt Readings from Last 2 Encounters:   07/07/23 55.7 kg (122 lb 11.2 oz)   05/05/23 56.8 kg (125 lb 4.8 oz)     Colonoscopy completed 7/13 - internal/external hemorrhoids, otherwise normal exam. Told to repeat in 7 years.       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, 6, 7 DJD    Other and unspecified malignant neoplasm of skin of other and unspecified parts of face    Generalized hyperhidrosis    CARDIOVASCULAR SCREENING; LDL GOAL LESS THAN 160    Menopausal syndrome (hot flashes)    Cervicalgia    PMB (postmenopausal bleeding)    Bilateral occipital neuralgia    Osteopenia of both hips     Past Medical History:   Diagnosis Date    DJD C56,7      Current Outpatient Medications   Medication    acetaminophen (TYLENOL) 500 MG tablet    CITRACAL/VITAMIN D PO    estradiol (ESTRACE) 0.1 MG/GM vaginal cream    ibuprofen (ADVIL/MOTRIN) 200 MG tablet    nortriptyline (PAMELOR) 10 MG capsule     No current facility-administered medications for this visit.        Allergies   Allergen Reactions    No Known Allergies        Review of Systems    ROS: 10 point ROS neg other than the symptoms noted above in the HPI.        Objective       Vitals:  No vitals were obtained today due to virtual visit.    Physical Exam   GENERAL: Healthy, alert and no distress  EYES: Eyes grossly normal to inspection.  No discharge or erythema, or obvious scleral/conjunctival abnormalities.  RESP: No audible wheeze, cough, or visible cyanosis.  No visible retractions or increased work of breathing.    SKIN: Visible skin clear. No significant rash, abnormal pigmentation or lesions.  NEURO: Cranial nerves grossly intact.  Mentation and speech appropriate for age.  PSYCH: Mentation appears normal, affect normal/bright, judgement and insight intact, normal speech and appearance well-groomed.        Video-Visit  Details    Type of service:  Video Visit   Video Start Time:  7:26 am  Video End Time: 7:36 AM    Originating Location (pt. Location): Home    Distant Location (provider location):  Off-site  Platform used for Video Visit: Well

## 2023-08-10 ENCOUNTER — HOSPITAL ENCOUNTER (OUTPATIENT)
Dept: ULTRASOUND IMAGING | Facility: CLINIC | Age: 63
Discharge: HOME | End: 2023-08-10
Attending: NURSE PRACTITIONER | Admitting: NURSE PRACTITIONER
Payer: COMMERCIAL

## 2023-08-10 DIAGNOSIS — R14.0 ABDOMINAL BLOATING: ICD-10-CM

## 2023-08-10 LAB
IGA SERPL-MCNC: 153 MG/DL (ref 84–499)
TTG IGA SER-ACNC: 0.3 U/ML
TTG IGG SER-ACNC: 0.7 U/ML

## 2023-08-10 PROCEDURE — 76830 TRANSVAGINAL US NON-OB: CPT

## 2023-08-16 ENCOUNTER — HOSPITAL ENCOUNTER (EMERGENCY)
Facility: CLINIC | Age: 63
End: 2023-08-16
Payer: COMMERCIAL

## 2023-08-17 ENCOUNTER — OFFICE VISIT (OUTPATIENT)
Dept: URGENT CARE | Facility: URGENT CARE | Age: 63
End: 2023-08-17
Payer: COMMERCIAL

## 2023-08-17 VITALS
SYSTOLIC BLOOD PRESSURE: 119 MMHG | DIASTOLIC BLOOD PRESSURE: 79 MMHG | HEART RATE: 76 BPM | TEMPERATURE: 98.2 F | OXYGEN SATURATION: 99 %

## 2023-08-17 DIAGNOSIS — T63.441A BEE STING REACTION, ACCIDENTAL OR UNINTENTIONAL, INITIAL ENCOUNTER: Primary | ICD-10-CM

## 2023-08-17 PROCEDURE — 99213 OFFICE O/P EST LOW 20 MIN: CPT | Performed by: PHYSICIAN ASSISTANT

## 2023-08-17 NOTE — PROGRESS NOTES
URGENT CARE VISIT:    SUBJECTIVE:   Chief Complaint   Patient presents with    Urgent Care     Bee sting yx - left hand      Becky Lay is a 63 year old female who presents with a chief complaint of left forearm pain, swelling, and redness.  Symptoms began 1 day(s) ago, are mild and moderate and sudden onset  Redness is spreading but it is less intense than yesterday.  She treated it initially with Benadryl and HC cream with some relief. This is the first time this type of injury has occurred to this patient.     PMH:   Past Medical History:   Diagnosis Date    DJD C56,7      Allergies: No known allergies   Medications:   Current Outpatient Medications   Medication Sig Dispense Refill    acetaminophen (TYLENOL) 500 MG tablet Take 500-1,000 mg by mouth every 6 hours as needed for mild pain      CITRACAL/VITAMIN D PO 2 a day      estradiol (ESTRACE) 0.1 MG/GM vaginal cream PLACE 2 GRAMS VAGINALLY 3 TIMES A WEEK 42.5 g 1    ibuprofen (ADVIL/MOTRIN) 200 MG tablet Take 200 mg by mouth every 4 hours as needed for mild pain      nortriptyline (PAMELOR) 10 MG capsule Take 3 capsules (30 mg) by mouth At Bedtime 120 capsule 3     Social History:   Social History     Tobacco Use    Smoking status: Never    Smokeless tobacco: Never   Substance Use Topics    Alcohol use: Yes     Alcohol/week: 10.0 standard drinks of alcohol     Comment: Rarely, a glass of prosecco.       ROS:  Review of systems negative except as stated above.    OBJECTIVE:  /79   Pulse 76   Temp 98.2  F (36.8  C) (Tympanic)   LMP 06/20/2008   SpO2 99%   GENERAL APPEARANCE: healthy, alert and no distress  MUSCULOSKELETAL: No TTP over left forearm. FROM wrist.  EXTREMITIES: peripheral pulses normal  SKIN: moderate erythema and edema over dorsal surface of left hand and mid to distal forearm  NEURO: sensation intact       ASSESSMENT:    ICD-10-CM    1. Bee sting reaction, accidental or unintentional, initial encounter  T63.441A            PLAN:  Patient Instructions   Patient was educated on the natural course of condition. Conservative measures discussed including over-the-counter antihistamines (Zyrtec or Allegra) and hydrocortisone cream.  See your primary care provider if symptoms do not improve in 5 days. Seek emergency care if you develop severe swelling, difficulty swallowing, or difficulty breathing.    Patient verbalized understanding and is agreeable to plan. The patient was discharged ambulatory and in stable condition.    Brenda Leyva PA-C on 8/17/2023 at 1:00 PM

## 2023-08-17 NOTE — PATIENT INSTRUCTIONS
Patient was educated on the natural course of condition. Conservative measures discussed including over-the-counter antihistamines (Zyrtec or Allegra) and hydrocortisone cream.  See your primary care provider if symptoms do not improve in 5 days. Seek emergency care if you develop severe swelling, difficulty swallowing, or difficulty breathing.

## 2023-09-13 NOTE — TELEPHONE ENCOUNTER
Records Requested     September 13, 2023 2:05 PM  [place] [address]   Facility  [facility]  Fax: [phone]   Outcome Faxed request to [facility] for records.        REFERRAL INFORMATION:  Referring Provider:  Chica Jean APRN CNP  Referring Clinic:  [facility]  Reason for Visit/Diagnosis: R14.0 (ICD-10-CM) - Abdominal bloating  K59.00 (ICD-10-CM) - Constipation, unspecified constipation type      FUTURE VISIT INFORMATION:  Appointment Date: 9/14/23  Appointment Time: 8:45AM     NOTES STATUS DETAILS   OFFICE NOTE from Referring Provider Internal 8/9/23 - Chica Jean APRN CNP - Abdominal Bloating; constipation    OFFICE NOTE from Other Specialist In process [facility]   MEDICATION LIST Internal         COLONOSCOPY Received 7/13/23 - [facility]   8/6/18 -  GI    PERTINENT LABS Internal 8/9/23   PATHOLOGY REPORTS (RELATED) Received 8/6/18 - colonoscopy bx    IMAGING (CT, MRI, EGD, MRCP, Small Bowel Follow Through/SBT, MR/CT Enterography) Internal 8/10/23 - US pelvic transabdominal and transvaginal   7/7/23 - XR Abdomen

## 2023-09-14 ENCOUNTER — VIRTUAL VISIT (OUTPATIENT)
Dept: GASTROENTEROLOGY | Facility: CLINIC | Age: 63
End: 2023-09-14
Attending: NURSE PRACTITIONER
Payer: COMMERCIAL

## 2023-09-14 ENCOUNTER — PRE VISIT (OUTPATIENT)
Dept: GASTROENTEROLOGY | Facility: CLINIC | Age: 63
End: 2023-09-14

## 2023-09-14 DIAGNOSIS — R14.0 ABDOMINAL BLOATING: ICD-10-CM

## 2023-09-14 DIAGNOSIS — K59.00 CONSTIPATION, UNSPECIFIED CONSTIPATION TYPE: ICD-10-CM

## 2023-09-14 PROCEDURE — 99204 OFFICE O/P NEW MOD 45 MIN: CPT | Mod: VID | Performed by: PHYSICIAN ASSISTANT

## 2023-09-14 ASSESSMENT — PAIN SCALES - GENERAL: PAINLEVEL: NO PAIN (0)

## 2023-09-14 NOTE — LETTER
9/14/2023         RE: Becky Lay  [address]  [place] [address]        Dear Colleague,    Thank you for referring your patient, Becky Lay, to the [facility] [place]. Please see a copy of my visit note below.    GI CLINIC VISIT    CC/REFERRING PROVIDER: Chica Jaen    HPI: 63 year old female with presenting to GI clinic for chronic constipation and chronic abdominal pain    Becky Montenegro states that she is presenting with irregular bowel habits. She states she has struggled with this for several years. This was predominantly constipation, which she states she had chronically off and on. In the last year, she was seeing a chiropractor and was recommended to increase hydration and reduce sugar to help with headaches. With these changes, for almost a year, she felt like she had a significant improvement in symptoms. When she feels good, she has a daily satisfactory bowel habit without needing external compression. Around the end of June, she traveled to [place]. She states she typically does get irregular while traveling, but she tried to be prepared with francheska seeds, vegetables, etc, but this did not help. During this time, she was feeling more bloated, and had increasing difficulty with constipation.  She started Metamucil once daily without improvement, and then increased. She almost wonders if she was taking too much fiber. About a week ago, she stopped francheska seeds and Metamucil. She had been using 1-3 tablespoons of francheska seeds over the course of 1-2 days. She was also doing nuts, oatmeal, vegetables, etc. She has actually been feeling better with these changes. Right now stools are not what she would consider healthy. They are lumpy. Straining and external compression present. Intermittent sense of incomplete evacuation. She did feel temporarily improved after colonoscopy. She has had four babies, with her fourth being over 10#. She sometimes has to push  externally in the area of the anus to help with bowel movements. She is active with exercise, and takes in adequate hydration.    - TSH normal (5/2023), negative celiac serology (8/2023)  - Abdominal XR (7/7/2023) - unremarkable stool burden, no suggestive of obstruction  - TVUS (8/10/2023) - unremarkable  - Colonoscopy (7/2023) unremarkable endoscopic exam with the exception of internal and external hemorrhoids    Pertinent medications:  - Nortiptylline 30 mg nightly      ROS: 10pt ROS performed and otherwise negative.    PAST MEDICAL HISTORY:  Past Medical History:   Diagnosis Date    DJD C56,7        PREVIOUS ABDOMINAL/GYNECOLOGIC SURGERIES:    Past Surgical History:   Procedure Laterality Date    HEAD & NECK SURGERY  3/2003    Discectomy and fusion    ZZC VASQUES W/O FACETEC FORAMOT/DSKC 1/2 VRT SEG, CERVICAL  2004    anterior c- 5, 6 fusion         PERTINENT MEDICATIONS:  Current Outpatient Medications   Medication Sig Dispense Refill    acetaminophen (TYLENOL) 500 MG tablet Take 500-1,000 mg by mouth every 6 hours as needed for mild pain      CITRACAL/VITAMIN D PO 2 a day      estradiol (ESTRACE) 0.1 MG/GM vaginal cream PLACE 2 GRAMS VAGINALLY 3 TIMES A WEEK 42.5 g 1    ibuprofen (ADVIL/MOTRIN) 200 MG tablet Take 200 mg by mouth every 4 hours as needed for mild pain      nortriptyline (PAMELOR) 10 MG capsule Take 3 capsules (30 mg) by mouth At Bedtime 120 capsule 3       No other NSAIDs reported by patient.  No other OTC/herbal/supplements reported by patient.    SOCIAL HISTORY:  Social History     Socioeconomic History    Marital status:      Spouse name: Not on file    Number of children: Not on file    Years of education: Not on file    Highest education level: Not on file   Occupational History    Occupation: school nurse     Employer: [employer]   Tobacco Use    Smoking status: Never    Smokeless tobacco: Never   Vaping Use    Vaping Use: Never used   Substance and Sexual Activity     Alcohol use: Yes     Alcohol/week: 10.0 standard drinks of alcohol     Comment: Rarely, a glass of prosecco.    Drug use: No    Sexual activity: Yes     Partners: Male     Birth control/protection: Post-menopausal   Other Topics Concern    Parent/sibling w/ CABG, MI or angioplasty before 65F 55M? Yes     Comment: Father at age 51   Social History Narrative    Retired.     Was a school nurse.     She and her  enjoy walking together and dancing.     Four kids.          Social Determinants of Health     Financial Resource Strain: Low Risk  (5/4/2023)    Overall Financial Resource Strain (CARDIA)     Difficulty of Paying Living Expenses: Not hard at all   Food Insecurity: No Food Insecurity (5/4/2023)    Hunger Vital Sign     Worried About Running Out of Food in the Last Year: Never true     Ran Out of Food in the Last Year: Never true   Transportation Needs: No Transportation Needs (5/4/2023)    PRAPARE - Transportation     Lack of Transportation (Medical): No     Lack of Transportation (Non-Medical): No   Physical Activity: Insufficiently Active (5/4/2023)    Exercise Vital Sign     Days of Exercise per Week: 3 days     Minutes of Exercise per Session: 40 min   Stress: No Stress Concern Present (5/4/2023)    [place] [employer] - Occupational Stress Questionnaire     Feeling of Stress : Only a little   Social Connections: Socially Integrated (5/4/2023)    Social Connection and Isolation Panel [NHANES]     Frequency of Communication with Friends and Family: More than three times a week     Frequency of Social Gatherings with Friends and Family: Twice a week     Attends Hindu Services: More than 4 times per year     Active Member of Clubs or Organizations: Yes     Attends Club or Organization Meetings: Not on file     Marital Status:    Intimate Partner Violence: Not on file   Housing Stability: Low Risk  (5/4/2023)    Housing Stability Vital Sign     Unable to Pay for Housing in  the Last Year: No     Number of Places Lived in the Last Year: 1     Unstable Housing in the Last Year: No       FAMILY HISTORY:    Family History   Problem Relation Age of Onset    Osteoporosis Mother     Lipids Mother     Dementia Mother     Heart Disease Father         MI age 50/BP and Chol Meds    Hyperlipidemia Father     Cancer Maternal Grandmother         colon    Colon Cancer Maternal Grandmother     Cancer Maternal Grandfather         lung ca       PHYSICAL EXAMINATION:  Vitals reviewed  LMP 06/20/2008   Video physical exam  General: Patient appears well in no acute distress.   Skin: No visualized rash or lesions on visualized skin  Eyes: EOMI, no erythema, sclera icterus or discharge noted  Resp: Appears to be breathing comfortably without accessory muscle usage, speaking in full sentences, no cough  MSK: Appears to have normal range of motion based on visualized movements  Neurologic: No apparent tremors, facial movements symmetric  Psych: affect normal, alert and oriented    The rest of a comprehensive physical examination is deferred due to PHE (public health emergency) video restrictions      PERTINENT STUDIES Reviewed in EMR    ASSESSMENT/PLAN:    # Chronic constipation  History of intermittent constipation symptoms, marked by straining, sense of incomplete evacuation. and need for external compression to help with evacuation. Symptoms had previously resolved with high-fiber diet, however returned after traveling, which is not atypical for her. Despite adherence to high fiber diet and with additional Metamucil, she still did not have improvement in symptoms, prompting further work-up. This has included unremarkable TSH, celiac serologies, TVUS, and colonoscopy.    We reviewed the different types of constipation today. Her symptoms are not consistent with IBS-C. Suspect pelvic floor dysfunction is contributing, with possible component of  CIC/slow transit. Nortriptyline can also contribute to  constipation. Reassured by negative work-up. Discussed the following  1) Continue high-fiber diet  2) Consider adding in osmotic laxative in the form of MiraLAX or magnesium supplementation  3) Future consideration for pelvic floor referral    RTC PRN per patient preference  Thank you for this consultation. It was a pleasure to participate in the care of this patient; please contact us with any further questions.      51 minutes spent on the date of the encounter doing chart review, review of test results, patient visit and documentation        Again, thank you for allowing me to participate in the care of your patient.      Sincerely,    Stacy Bailey PA-C

## 2023-09-14 NOTE — PROGRESS NOTES
GI CLINIC VISIT    CC/REFERRING PROVIDER: Chica Jean    HPI: 63 year old female with presenting to GI clinic for chronic constipation and chronic abdominal pain    Becky Montenegro states that she is presenting with irregular bowel habits. She states she has struggled with this for several years. This was predominantly constipation, which she states she had chronically off and on. In the last year, she was seeing a chiropractor and was recommended to increase hydration and reduce sugar to help with headaches. With these changes, for almost a year, she felt like she had a significant improvement in symptoms. When she feels good, she has a daily satisfactory bowel habit without needing external compression. Around the end of June, she traveled to [place]. She states she typically does get irregular while traveling, but she tried to be prepared with francheska seeds, vegetables, etc, but this did not help. During this time, she was feeling more bloated, and had increasing difficulty with constipation.  She started Metamucil once daily without improvement, and then increased. She almost wonders if she was taking too much fiber. About a week ago, she stopped francheska seeds and Metamucil. She had been using 1-3 tablespoons of francheska seeds over the course of 1-2 days. She was also doing nuts, oatmeal, vegetables, etc. She has actually been feeling better with these changes. Right now stools are not what she would consider healthy. They are lumpy. Straining and external compression present. Intermittent sense of incomplete evacuation. She did feel temporarily improved after colonoscopy. She has had four babies, with her fourth being over 10#. She sometimes has to push externally in the area of the anus to help with bowel movements. She is active with exercise, and takes in adequate hydration.    - TSH normal (5/2023), negative celiac serology (8/2023)  - Abdominal XR (7/7/2023) - unremarkable stool burden, no suggestive of obstruction  -  TVUS (8/10/2023) - unremarkable  - Colonoscopy (7/2023) unremarkable endoscopic exam with the exception of internal and external hemorrhoids    Pertinent medications:  - Nortiptylline 30 mg nightly      ROS: 10pt ROS performed and otherwise negative.    PAST MEDICAL HISTORY:  Past Medical History:   Diagnosis Date     DJD C56,7        PREVIOUS ABDOMINAL/GYNECOLOGIC SURGERIES:    Past Surgical History:   Procedure Laterality Date     HEAD & NECK SURGERY  3/2003    Discectomy and fusion     ZZC VASQUES W/O FACETEC FORAMOT/DSKC 1/2 VRT SEG, CERVICAL  2004    anterior c- 5, 6 fusion         PERTINENT MEDICATIONS:  Current Outpatient Medications   Medication Sig Dispense Refill     acetaminophen (TYLENOL) 500 MG tablet Take 500-1,000 mg by mouth every 6 hours as needed for mild pain       CITRACAL/VITAMIN D PO 2 a day       estradiol (ESTRACE) 0.1 MG/GM vaginal cream PLACE 2 GRAMS VAGINALLY 3 TIMES A WEEK 42.5 g 1     ibuprofen (ADVIL/MOTRIN) 200 MG tablet Take 200 mg by mouth every 4 hours as needed for mild pain       nortriptyline (PAMELOR) 10 MG capsule Take 3 capsules (30 mg) by mouth At Bedtime 120 capsule 3       No other NSAIDs reported by patient.  No other OTC/herbal/supplements reported by patient.    SOCIAL HISTORY:  Social History     Socioeconomic History     Marital status:      Spouse name: Not on file     Number of children: Not on file     Years of education: Not on file     Highest education level: Not on file   Occupational History     Occupation: school nurse     Employer: [employer]   Tobacco Use     Smoking status: Never     Smokeless tobacco: Never   Vaping Use     Vaping Use: Never used   Substance and Sexual Activity     Alcohol use: Yes     Alcohol/week: 10.0 standard drinks of alcohol     Comment: Rarely, a glass of prosecco.     Drug use: No     Sexual activity: Yes     Partners: Male     Birth control/protection: Post-menopausal   Other Topics Concern     Parent/sibling  w/ CABG, MI or angioplasty before 65F 55M? Yes     Comment: Father at age 51   Social History Narrative    Retired.     Was a school nurse.     She and her  enjoy walking together and dancing.     Four kids.          Social Determinants of Health     Financial Resource Strain: Low Risk  (5/4/2023)    Overall Financial Resource Strain (CARDIA)      Difficulty of Paying Living Expenses: Not hard at all   Food Insecurity: No Food Insecurity (5/4/2023)    Hunger Vital Sign      Worried About Running Out of Food in the Last Year: Never true      Ran Out of Food in the Last Year: Never true   Transportation Needs: No Transportation Needs (5/4/2023)    PRAPARE - Transportation      Lack of Transportation (Medical): No      Lack of Transportation (Non-Medical): No   Physical Activity: Insufficiently Active (5/4/2023)    Exercise Vital Sign      Days of Exercise per Week: 3 days      Minutes of Exercise per Session: 40 min   Stress: No Stress Concern Present (5/4/2023)    [place] [employer] - Occupational Stress Questionnaire      Feeling of Stress : Only a little   Social Connections: Socially Integrated (5/4/2023)    Social Connection and Isolation Panel [NHANES]      Frequency of Communication with Friends and Family: More than three times a week      Frequency of Social Gatherings with Friends and Family: Twice a week      Attends Mandaeism Services: More than 4 times per year      Active Member of Clubs or Organizations: Yes      Attends Club or Organization Meetings: Not on file      Marital Status:    Intimate Partner Violence: Not on file   Housing Stability: Low Risk  (5/4/2023)    Housing Stability Vital Sign      Unable to Pay for Housing in the Last Year: No      Number of Places Lived in the Last Year: 1      Unstable Housing in the Last Year: No       FAMILY HISTORY:    Family History   Problem Relation Age of Onset     Osteoporosis Mother      Lipids Mother      Dementia  Mother      Heart Disease Father         MI age 50/BP and Chol Meds     Hyperlipidemia Father      Cancer Maternal Grandmother         colon     Colon Cancer Maternal Grandmother      Cancer Maternal Grandfather         lung ca       PHYSICAL EXAMINATION:  Vitals reviewed  LMP 06/20/2008   Video physical exam  General: Patient appears well in no acute distress.   Skin: No visualized rash or lesions on visualized skin  Eyes: EOMI, no erythema, sclera icterus or discharge noted  Resp: Appears to be breathing comfortably without accessory muscle usage, speaking in full sentences, no cough  MSK: Appears to have normal range of motion based on visualized movements  Neurologic: No apparent tremors, facial movements symmetric  Psych: affect normal, alert and oriented    The rest of a comprehensive physical examination is deferred due to PHE (public health emergency) video restrictions      PERTINENT STUDIES Reviewed in EMR    ASSESSMENT/PLAN:    # Chronic constipation  History of intermittent constipation symptoms, marked by straining, sense of incomplete evacuation. and need for external compression to help with evacuation. Symptoms had previously resolved with high-fiber diet, however returned after traveling, which is not atypical for her. Despite adherence to high fiber diet and with additional Metamucil, she still did not have improvement in symptoms, prompting further work-up. This has included unremarkable TSH, celiac serologies, TVUS, and colonoscopy.    We reviewed the different types of constipation today. Her symptoms are not consistent with IBS-C. Suspect pelvic floor dysfunction is contributing, with possible component of  CIC/slow transit. Nortriptyline can also contribute to constipation. Reassured by negative work-up. Discussed the following  1) Continue high-fiber diet  2) Consider adding in osmotic laxative in the form of MiraLAX or magnesium supplementation  3) Future consideration for pelvic floor  referral    RTC PRN per patient preference  Thank you for this consultation. It was a pleasure to participate in the care of this patient; please contact us with any further questions.    Stacy Bailey PA-C    51 minutes spent on the date of the encounter doing chart review, review of test results, patient visit and documentation

## 2023-09-14 NOTE — NURSING NOTE
Is the patient currently in the state of [place]? YES    Visit mode:VIDEO    If the visit is dropped, the patient can be reconnected by: VIDEO VISIT: Text to cell phone:   Telephone Information:   Mobile [phone]       Will anyone else be joining the visit? NO  (If patient encounters technical issues they should call [phone])    How would you like to obtain your AVS? MyChart    Are changes needed to the allergy or medication list? No    Reason for visit: No chief complaint on file.    Karyn HALL

## 2023-09-14 NOTE — PROGRESS NOTES
Virtual Visit Details    Type of service:  Video Visit   Video Start Time: 842Video End Time:920    Originating Location (pt. Location): Home    Distant Location (provider location):  Off-site  Platform used for Video Visit: OME[employer] MORGAN

## 2023-09-14 NOTE — PATIENT INSTRUCTIONS
It was a pleasure taking care of you today.  I've included a brief summary of our discussion and care plan from today's visit below.  Please review this information with your primary care provider.  _______________________________________________________________________    My recommendations are summarized as follows:    -- Continue the higher fiber diet you previously had been doing, as tolerated. Keep up the good work!  -- You can use over-the-counter magnesium supplements, which work as a gentle, non-habit forming laxative. Look for magnesium citrate products. These are often available in tablets, capsules, powders, or gummies. A good starting dose for most people is 300-400 mg daily. If you experience diarrhea with this dosing, you can decrease to 100-200 mg daily. It may be increased up to 1000 mg daily.  -- Instead of Magnesium, you could also use MiraLAX 1 capful daily. This can be adjusted down to 1/2 capful  daily, or up to 3 capfuls daily.  -- If still struggling with balancing these symptoms, next step would be a referral to the [facility]. You can let us know if you would a referral at any time.    Return to GI Clinic as needed   ______________________________________________________________________    How do I schedule labs, imaging studies, or procedures that were ordered in clinic today?     Labs: To schedule lab appointment at the [facility], use my chart or call [phone]. If you have a [facility] lab closer to home where you are regularly seen you can give them a call.     Procedures: If a colonoscopy, upper endoscopy, breath test, esophageal manometry, or pH impedence was ordered today, our endoscopy team will call you to schedule this. If you have not heard from our endoscopy team within a week, please call [phone] option 2 to schedule.     Imaging Studies: If you were scheduled for a CT scan, X-ray, MRI, ultrasound, HIDA scan or other imaging study, please call  [phone] to have this scheduled.     Referral: If a referral to another specialty was ordered, expect a phone call or follow instructions above. If you have not heard from anyone regarding your referral in a week, please call our clinic to check the status.     Who do I call with any questions after my visit?  Please be in touch if there are any further questions that arise following today's visit.  There are multiple ways to contact your gastroenterology care team.      During business hours, you may reach a Gastroenterology nurse at [phone]    To schedule or reschedule an appointment, please call [phone].     You can always send a secure message through [employer].  [employer] messages are answered by your nurse or doctor typically within 24 hours.  Please allow extra time on weekends and holidays.      For urgent/emergent questions after business hours, you may reach the on-call GI Fellow by contacting the [facility]  at [phone].     How will I get the results of any tests ordered?    You will receive all of your results.  If you have signed up for [employer], any tests ordered at your visit will be available to you after your physician reviews them.  Typically this takes 1-2 weeks.  If there are urgent results that require a change in your care plan, your physician or nurse will call you to discuss the next steps.      What is [employer]?  [employer] is a secure way for you to access all of your healthcare records from the [facility].  It is a web based computer program, so you can sign on to it from any location.  It also allows you to send secure messages to your care team.  I recommend signing up for [employer] access if you have not already done so and are comfortable with using a computer.      How to I schedule a follow-up visit?  If you did not schedule a follow-up visit today, please call [phone] to schedule a follow-up office visit.      Sincerely,    Stacy  GIORGI Bailey  Division of Gastroenterology, Hepatology & Nutrition  [facility]

## 2023-09-21 ENCOUNTER — TELEPHONE (OUTPATIENT)
Dept: GASTROENTEROLOGY | Facility: CLINIC | Age: 63
End: 2023-09-21
Payer: COMMERCIAL

## 2023-10-12 NOTE — TELEPHONE ENCOUNTER
[facility] Referral      1. Confirmed address with patient? Yes  2. Confirmed phone number with patient? Yes  3. Confirmed referring provider? Yes  4. Is the PCP the same as the referring provider? Yes  5. Has the patient been to any previous pain clinics? Yes  (If yes, send BIBIANA with welcome letter)  6. Which insurance are we to bill for this appointment?  [employer]     7. Informed pt of cancellation (48 hour) policy? Yes    REGARDING OPIOID MEDICATIONS: We will always address appropriateness of opioid pain medications, but we generally will not automatically take on a prescribing role. When we do take on prescribing of opioids for chronic pain, it is in collaboration with the referring physician for an intermediate period of time (months), with an expectation that the primary physician or provider will assume the prescribing role if medications are effective at stable doses with demonstrated compliance. Therefore, please do not assume that your prescribing responsibilities end on the day of pain clinic consultation.  8. Informed pt of prescribing policy? Yes    9.Please be aware that once you are established with a pain provider and location, you will need to continue have all future visits with that provider and location. It is best to determine what location is the most convenient for you and schedule with that one.    ** PATIENT INFORMED OF THIS POLICY Yes      9. Referring Provider: Paty Thomson     10. Criteria for Triage Eval:   -Missed/Failed 1st appointment? N/A     -Medication Focused? N/A     -Mental Health Concerns? (e.g. Recent psych hospitalization/snap shot)? N/A     -Active substance abuse? N/A     -Patient behaviors (e.g. Offensive language/raised voice)? N/A     Intubated 10/11 for mental status and hypoxia on max HFNC  Lung protective ventilation   ABCDEF bundle  CXR as indicated: monitor lung volumes and tube/line placement  VAP bundle prevention, oral care, post pyloric feeding  Head of bed > 30 degree  GI prophylaxis  Daily awakening and SBT trials unless contraindicated  Monitor for liberation  Respiratory treatments: prn  Trial of extubation today, albeit without perfect weaning parameters

## 2023-11-02 ENCOUNTER — IMMUNIZATION (OUTPATIENT)
Dept: FAMILY MEDICINE | Facility: CLINIC | Age: 63
End: 2023-11-02
Payer: COMMERCIAL

## 2023-11-02 DIAGNOSIS — Z23 NEED FOR INFLUENZA VACCINATION: Primary | ICD-10-CM

## 2023-11-02 PROCEDURE — 90662 IIV NO PRSV INCREASED AG IM: CPT

## 2023-11-02 PROCEDURE — G0008 ADMIN INFLUENZA VIRUS VAC: HCPCS

## 2023-11-02 PROCEDURE — 99207 PR NO CHARGE NURSE ONLY: CPT

## 2023-11-02 NOTE — PROGRESS NOTES
Prior to immunization administration, verified patients identity using patient s name and date of birth. Please see Immunization Activity for additional information.     Screening Questionnaire for Adult Immunization    Are you sick today?   No   Do you have allergies to medications, food, a vaccine component or latex?   No   Have you ever had a serious reaction after receiving a vaccination?   No   Do you have a long-term health problem with heart, lung, kidney, or metabolic disease (e.g., diabetes), asthma, a blood disorder, no spleen, complement component deficiency, a cochlear implant, or a spinal fluid leak?  Are you on long-term aspirin therapy?   No   Do you have cancer, leukemia, HIV/AIDS, or any other immune system problem?   No   Do you have a parent, brother, or sister with an immune system problem?   No   In the past 3 months, have you taken medications that affect  your immune system, such as prednisone, other steroids, or anticancer drugs; drugs for the treatment of rheumatoid arthritis, Crohn s disease, or psoriasis; or have you had radiation treatments?   No   Have you had a seizure, or a brain or other nervous system problem?   No   During the past year, have you received a transfusion of blood or blood    products, or been given immune (gamma) globulin or antiviral drug?   No   For women: Are you pregnant or is there a chance you could become       pregnant during the next month?   No   Have you received any vaccinations in the past 4 weeks?   No     Immunization questionnaire answers were all negative.    I have reviewed the following standing orders:   This patient is due and qualifies for the Influenza vaccine.    Click here for Influenza Vaccine Standing Order    I have reviewed the vaccines inclusion and exclusion criteria; No concerns regarding eligibility.     Patient instructed to remain in clinic for 15 minutes afterwards, and to report any adverse reactions.     Screening performed by  Maggie Kincaid on 11/2/2023 at 9:34 AM.

## 2023-11-20 ENCOUNTER — PATIENT OUTREACH (OUTPATIENT)
Dept: GASTROENTEROLOGY | Facility: CLINIC | Age: 63
End: 2023-11-20
Payer: COMMERCIAL

## 2024-02-15 ENCOUNTER — OFFICE VISIT (OUTPATIENT)
Dept: PEDIATRICS | Facility: CLINIC | Age: 64
End: 2024-02-15
Payer: COMMERCIAL

## 2024-02-15 VITALS
RESPIRATION RATE: 16 BRPM | WEIGHT: 124 LBS | TEMPERATURE: 97.8 F | HEART RATE: 68 BPM | SYSTOLIC BLOOD PRESSURE: 110 MMHG | OXYGEN SATURATION: 99 % | BODY MASS INDEX: 20.66 KG/M2 | HEIGHT: 65 IN | DIASTOLIC BLOOD PRESSURE: 70 MMHG

## 2024-02-15 DIAGNOSIS — R20.2 FACIAL PARESTHESIA: Primary | ICD-10-CM

## 2024-02-15 DIAGNOSIS — R20.2 PARESTHESIA OF LEFT ARM: ICD-10-CM

## 2024-02-15 PROCEDURE — 99215 OFFICE O/P EST HI 40 MIN: CPT | Performed by: NURSE PRACTITIONER

## 2024-02-15 RX ORDER — RESPIRATORY SYNCYTIAL VIRUS VACCINE 120MCG/0.5
0.5 KIT INTRAMUSCULAR ONCE
Qty: 1 EACH | Refills: 0 | Status: CANCELLED | OUTPATIENT
Start: 2024-02-15 | End: 2024-02-15

## 2024-02-15 ASSESSMENT — PAIN SCALES - GENERAL: PAINLEVEL: NO PAIN (0)

## 2024-02-15 NOTE — PROGRESS NOTES
Assessment & Plan     Facial paresthesia  Paresthesia of left arm  Etiology unclear. Neurologic exam today is largely unremarkable. Ddx include [place] Palsy, infection, migraine, multiple sclerosis, stroke, shingles, other. Given the long duration of her symptoms, I do not feel immediate work-up/hospitalization is necessary at this time. She was reportedly evaluated at [facility] on 1/24/24, which didn't yield a diagnosis according to patient report. No imaging was completed. Has appt with [facility] on 3/8/24. Will obtain brain MRI prior to that appt.   - MR Brain w/o & w Contrast; Future      47 minutes spent by me on the date of the encounter doing chart review, review of test results, patient visit, and documentation       FURTHER TESTING:       - MRI Brain  CONSULTATION/REFERRAL to Neurology    Subjective   Becky Montenegro is a 63 year old, presenting for the following health issues:  Facial Pain (Patient reports of burning and tingling in her face/arm. Onset since the fall in her chin and then started again in January in her face and into her leg and back area. It has been intermittent and nothing that she notices triggers it to happen.)        2/15/2024     2:39 PM   Additional Questions   Roomed by Myrtle   Accompanied by self         2/15/2024     2:39 PM   Patient Reported Additional Medications   Patient reports taking the following new medications no     In November (3 months ago) she noted a localized area of numbness to the left side of her chin. She called her dentist who recommended she follow-up with the oral surgeon given her history of implants to the left side. Symptoms were pretty mild at that time, intermittent and stable. Then in January she noticed the numbness to be affecting her left cheek and forehead. Face feels a little funny, mild aching. She also noticed similar intermittent numbness and tingling down left arm and to left upper back. She followed up with  "her oral surgeon 1/24/24 who said everything looked okay with her implant and this was not thought to be related. Recommended neurology follow-up. She saw her previous neurologist ([facility]) on 1/24/24. They did an exam but no imaging. She didn't get any answers and felt her symptoms were somewhat brushed off.       History of cervical fusion.  Sometimes she has slight tingling in her left arm related to that. Described as her left arm feeling a little different and weaker compared to her right.     Balance issues: maybe slight but she wonders if this is related to age.   Vision changes: no, recently got glasses but feels this was a long time coming.   No eye drainage or runny nose.   Denies new weakness.   No facial dropping.   History of tension headaches related to her neck problems.   No fevers.     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, 6, 7 DJD    Other and unspecified malignant neoplasm of skin of other and unspecified parts of face    Generalized hyperhidrosis    CARDIOVASCULAR SCREENING; LDL GOAL LESS THAN 160    Menopausal syndrome (hot flashes)    Cervicalgia    PMB (postmenopausal bleeding)    Bilateral occipital neuralgia    Osteopenia of both hips     Past Medical History:   Diagnosis Date    DJD C56,7      Current Outpatient Medications   Medication    acetaminophen (TYLENOL) 500 MG tablet    CITRACAL/VITAMIN D PO    estradiol (ESTRACE) 0.1 MG/GM vaginal cream    ibuprofen (ADVIL/MOTRIN) 200 MG tablet     No current facility-administered medications for this visit.        Allergies   Allergen Reactions    No Known Allergies               ROS: 10 point ROS neg other than the symptoms noted above in the HPI.        Objective    /70 (BP Location: Right arm, Patient Position: Sitting, Cuff Size: Adult Regular)   Pulse 68   Temp 97.8  F (36.6  C) (Tympanic)   Resp 16   Ht 1.651 m (5' 5\")   Wt 56.2 kg (124 lb)   LMP 06/20/2008   SpO2 99%   BMI 20.63 kg/m    Body " mass index is 20.63 kg/m .  Physical Exam  Constitutional:       General: She is not in acute distress.     Appearance: Normal appearance. She is not ill-appearing or toxic-appearing.   Cardiovascular:      Rate and Rhythm: Normal rate.   Pulmonary:      Effort: Pulmonary effort is normal. No respiratory distress.   Skin:     General: Skin is warm and dry.   Neurological:      General: No focal deficit present.      Mental Status: She is alert and oriented to person, place, and time. Mental status is at baseline.      Cranial Nerves: No cranial nerve deficit.      Motor: No weakness, tremor or pronator drift.      Coordination: Romberg sign negative. Coordination normal.      Gait: Gait normal.      Deep Tendon Reflexes:      Reflex Scores:       Patellar reflexes are 2+ on the right side and 2+ on the left side.  Psychiatric:         Mood and Affect: Mood normal.         Behavior: Behavior normal.                    Signed Electronically by: NANCY Perkins CNP

## 2024-03-02 ENCOUNTER — HOSPITAL ENCOUNTER (OUTPATIENT)
Dept: MRI IMAGING | Facility: CLINIC | Age: 64
Discharge: HOME | End: 2024-03-02
Attending: NURSE PRACTITIONER | Admitting: NURSE PRACTITIONER
Payer: COMMERCIAL

## 2024-03-02 DIAGNOSIS — R20.2 PARESTHESIA OF LEFT ARM: ICD-10-CM

## 2024-03-02 DIAGNOSIS — R20.2 FACIAL PARESTHESIA: ICD-10-CM

## 2024-03-02 PROCEDURE — 70553 MRI BRAIN STEM W/O & W/DYE: CPT

## 2024-03-02 PROCEDURE — 255N000002 HC RX 255 OP 636: Performed by: NURSE PRACTITIONER

## 2024-03-02 PROCEDURE — A9585 GADOBUTROL INJECTION: HCPCS | Performed by: NURSE PRACTITIONER

## 2024-03-02 RX ORDER — GADOBUTROL 604.72 MG/ML
6 INJECTION INTRAVENOUS ONCE
Status: COMPLETED | OUTPATIENT
Start: 2024-03-02 | End: 2024-03-02

## 2024-03-02 RX ADMIN — GADOBUTROL 6 ML: 604.72 INJECTION INTRAVENOUS at 09:54

## 2024-03-08 ENCOUNTER — OFFICE VISIT (OUTPATIENT)
Dept: NEUROLOGY | Facility: CLINIC | Age: 64
End: 2024-03-08
Attending: NURSE PRACTITIONER
Payer: COMMERCIAL

## 2024-03-08 VITALS
BODY MASS INDEX: 20.8 KG/M2 | DIASTOLIC BLOOD PRESSURE: 94 MMHG | SYSTOLIC BLOOD PRESSURE: 130 MMHG | WEIGHT: 125 LBS | RESPIRATION RATE: 16 BRPM | HEART RATE: 76 BPM

## 2024-03-08 DIAGNOSIS — R20.0 NUMBNESS: ICD-10-CM

## 2024-03-08 DIAGNOSIS — G43.809 OTHER MIGRAINE WITHOUT STATUS MIGRAINOSUS, NOT INTRACTABLE: ICD-10-CM

## 2024-03-08 DIAGNOSIS — M54.2 CERVICALGIA: Primary | ICD-10-CM

## 2024-03-08 DIAGNOSIS — R20.2 FACIAL PARESTHESIA: ICD-10-CM

## 2024-03-08 DIAGNOSIS — R73.9 HYPERGLYCEMIA: ICD-10-CM

## 2024-03-08 PROCEDURE — 99205 OFFICE O/P NEW HI 60 MIN: CPT | Performed by: PSYCHIATRY & NEUROLOGY

## 2024-03-08 RX ORDER — NORTRIPTYLINE HCL 10 MG
CAPSULE ORAL
Qty: 180 CAPSULE | Refills: 1 | Status: SHIPPED | OUTPATIENT
Start: 2024-03-08 | End: 2024-05-16

## 2024-03-08 NOTE — NURSING NOTE
Chief Complaint   Patient presents with    Consult     Facial and extremity symptoms intermittently     Cleo Griggs MA,CMA,9:28 AM

## 2024-03-08 NOTE — LETTER
3/8/2024         RE: Becky Lay  [address]  [place] [address]        Dear Colleague,    Thank you for referring your patient, Becky Lay, to the [facility] [place]. Please see a copy of my visit note below.    NEUROLOGY OUTPATIENT CONSULT NOTE   Mar 8, 2024     CHIEF COMPLAINT/REASON FOR VISIT/REASON FOR CONSULT  Patient presents with:  Consult: Facial and extremity symptoms intermittently     REASON FOR CONSULTATION- Numbness    REFERRAL SOURCE  Dr. Chica Jean  CC Dr. Chica Jean    HISTORY OF PRESENT ILLNESS  Becky Lay is a 63 year old female seen today for evaluation of numbness.  Her symptoms started in November 2023.  Initially the numbness was limited to the left chin but then spread to the left side of her face.  She is also been having intermittent numbness of the left arm and left leg.  Symptoms this could be more of a patchy distribution.  The symptoms on the face and the chin or not there all the time.  Symptoms are sporadic and can be multiple times a week or less than that.  Generally there is no clear provoking factor.  Does complain of occasional tingling in the left arm and leg.  She does have a history of fusion at the C5-C6 level though reports no change in the amount of neck pain that she has.  No recent trauma or any other strokelike symptoms.  No symptoms on the right side.  The symptoms in the legs correlate with the symptoms in the arm/face.    She does have a history of headaches previously diagnosed as tension headaches.  These generally are not at the base of her neck.  There is no associated photophobia or phonophobia.  Occasionally she will get shooting pain up the scalp but occasionally they will go to the temple and wraparound.  They are more of a pressure-like sensation.  She has been on 30 mg of nortriptyline for 3 years.  This was stopped in September as she felt that the headaches have gotten better and she did not  need this medication.  Stopping the medication has not changed the frequency of her headaches which are sporadic.    She reports no ongoing stress or anxiety that could be causing her symptoms.    Previous history is reviewed and this is unchanged.    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, 6, 7 DJD     Other and unspecified malignant neoplasm of skin of other and unspecified parts of face     Generalized hyperhidrosis     CARDIOVASCULAR SCREENING; LDL GOAL LESS THAN 160     Menopausal syndrome (hot flashes)     Cervicalgia     PMB (postmenopausal bleeding)     Bilateral occipital neuralgia     Osteopenia of both hips   Significant migraines, arthritis, tremors    FAMILY HISTORY  Family History   Problem Relation Age of Onset     Osteoporosis Mother      Lipids Mother      Dementia Mother      Heart Disease Father         MI age 50/BP and Chol Meds     Hyperlipidemia Father      Cancer Maternal Grandmother         colon     Colon Cancer Maternal Grandmother      Cancer Maternal Grandfather         lung ca   Positive for stroke.  No family history of migraine or complicated migraines.  Some family history of dementia.    SOCIAL HISTORY  Social History     Tobacco Use     Smoking status: Never     Smokeless tobacco: Never   Vaping Use     Vaping Use: Never used   Substance Use Topics     Alcohol use: Yes     Alcohol/week: 10.0 standard drinks of alcohol     Comment: Rarely, a glass of prosecco.     Drug use: No       SYSTEMS REVIEW  Twelve-system ROS was done and other than the HPI this was negative except for numbness/tingling, ringing in the ears, hearing loss.    MEDICATIONS  CITRACAL/VITAMIN D PO, 2 a day  estradiol (ESTRACE) 0.1 MG/GM vaginal cream, PLACE 2 GRAMS VAGINALLY 3 TIMES A WEEK  acetaminophen (TYLENOL) 500 MG tablet, Take 500-1,000 mg by mouth every 6 hours as needed for mild pain (Patient not taking:  Reported on 3/8/2024)  ibuprofen (ADVIL/MOTRIN) 200 MG tablet, Take 200 mg by mouth every 4 hours as needed for mild pain (Patient not taking: Reported on 3/8/2024)    No current facility-administered medications on file prior to visit.       PHYSICAL EXAMINATION  VITALS: BP (!) 130/94   Pulse 76   Resp 16   Wt 56.7 kg (125 lb)   LMP 06/20/2008   BMI 20.80 kg/m    GENERAL: Healthy appearing, alert, no acute distress, normal habitus.  CARDIOVASCULAR: Extremities warm and well perfused. Pulses present.   NEUROLOGICAL:  Patient is awake and oriented to self, place and time.  Attention span is normal.  Memory is grossly intact.  Language is fluent and follows commands appropriately.  Appropriate fund of knowledge. Cranial nerves 2-12 are intact. There is no pronator drift.  Motor exam shows 5/5 strength in all extremities.  Tone is symmetric bilaterally in upper and lower extremities.  Reflexes are symmetric and 2+ in upper extremities and lower extremities. Sensory exam is grossly intact to light touch, pin prick and vibration.  Finger to nose and heel to shin is without dysmetria.  Romberg is negative.  Gait is normal and the patient is able to do tandem walk and walk on toes and heels.        DIAGNOSTICS  MRI Brain 2024 images reviewed.      MRI C spine 2019      RELEVANT LABS  Component      Latest Ref Rng 5/5/2023  9:27 AM   TSH      0.30 - 4.20 uIU/mL 2.99        Component      Latest Ref Rng 3/21/2022  10:23 AM 6/16/2022  9:53 AM   WBC      4.0 - 11.0 10e3/uL  5.3    RBC Count      3.80 - 5.20 10e6/uL  4.45    Hemoglobin      11.7 - 15.7 g/dL  12.5    Hematocrit      35.0 - 47.0 %  37.6    MCV      78 - 100 fL  85    MCH      26.5 - 33.0 pg  28.1    MCHC      31.5 - 36.5 g/dL  33.2    RDW      10.0 - 15.0 %  12.5    Platelet Count      150 - 450 10e3/uL  257    % Neutrophils      %  66    % Lymphocytes      %  24    % Monocytes      %  7    % Eosinophils      %  2    % Basophils      %  1    Absolute  Neutrophils      1.6 - 8.3 10e3/uL  3.5    Absolute Lymphocytes      0.8 - 5.3 10e3/uL  1.3    Absolute Monocytes      0.0 - 1.3 10e3/uL  0.4    Absolute Eosinophils      0.0 - 0.7 10e3/uL  0.1    Absolute Basophils      0.0 - 0.2 10e3/uL  0.0    Sodium      133 - 144 mmol/L  133    Potassium      3.4 - 5.3 mmol/L  3.8    Chloride      94 - 109 mmol/L  101    Carbon Dioxide      20 - 32 mmol/L  28    Anion Gap      3 - 14 mmol/L  4    Urea Nitrogen      7 - 30 mg/dL  11    Creatinine      0.52 - 1.04 mg/dL  0.73    Calcium      8.5 - 10.1 mg/dL  9.3    Glucose      70 - 99 mg/dL  92    GFR Estimate      >60 mL/min/1.73m2  >90    TSH      0.40 - 4.00 mU/L 3.17     Sed Rate      0 - 30 mm/hr  5          OUTSIDE RECORDS  Outside referral notes and chart notes were reviewed and pertinent information has been summarized (in addition to the HPI):-      [facility]      IMPRESSION/REPORT/PLAN  Neck pain/cervicogenic headaches  Other migraine without status migrainosus, not intractable  Numbness    This is a 63 year old female with history of C5-C6 neck fusion/headaches in the back of the head clinically suggestive of migrainous headaches with intermittent left face arm and leg numbness.  She was previously taking nortriptyline for prevention of headaches and had to stop it because of side effects of constipation.  Subsequently stopping the medication the symptoms of left-sided numbness came on.  Her headaches have not increased in frequency or severity.  Possibly the left-sided numbness could be a migrainous phenomena/complicated migraine without the headache portion.    MRI brain has been negative for structural lesions.  MRI C-spine has not shown any significant stenosis in 2019 the patient wants to hold off on repeating the MRI at this point.  C-spine problem would not explain the facial numbness.  Did offer physical therapy though she wanted to hold off on that for right now.    We will retry the  nortriptyline to see if her symptoms go away with that.  If they do that would confirm that the symptoms are more related to her headaches.  Could try other medications where she does not have side effects of constipation.  She denied a stool softener today.    Will check blood work to look for other causes of numbness.    Can see her back in 3 to 4 months.  Encouraged her to keep a log of her symptoms to identify patterns.    -     nortriptyline (PAMELOR) 10 MG capsule; Take 1 cap/night and then increase by 1 cap/week to a max of 3 caps/night as needed and tolerated.  -     Vitamin B12; Future  -     Vitamin B1 whole blood; Future  -     TSH with free T4 reflex; Future  -     Protein Immunofixation Serum; Future  -     Protein electrophoresis; Future  -     Hemoglobin A1c; Future    Return in about 4 months (around 7/8/2024) for In-Clinic Visit (must).    Over 60 minutes were spent coordinating the care for the patient on the day of the encounter.  This includes previsit, during visit and post visit activities as documented above.  Counseling patient.  Reviewing chart.  Reviewing testing/previous notes.  Testing ordered.  Multiple problems reviewed/addressed.  (Activities include but not inclusive of reviewing chart, reviewing outside records, reviewing labs and imaging study results as well as the images, patient visit time including getting history and exam,  use if applicable, review of test results with the patient and coming up with a plan in a shared model, counseling patient and family, education and answering patient questions, EMR , EMR diagnosis entry and problem list management, medication reconciliation and prescription management if applicable, paperwork if applicable, printing documents and documentation of the visit activities.)        Waylon Live MD  Neurologist  [facility]  Tel:- [phone]    This note was dictated using voice  recognition software.  Any grammatical or context distortions are unintentional and inherent to the software.      Again, thank you for allowing me to participate in the care of your patient.        Sincerely,        Waylon Live MD

## 2024-03-08 NOTE — PROGRESS NOTES
NEUROLOGY OUTPATIENT CONSULT NOTE   Mar 8, 2024     CHIEF COMPLAINT/REASON FOR VISIT/REASON FOR CONSULT  Patient presents with:  Consult: Facial and extremity symptoms intermittently     REASON FOR CONSULTATION- Numbness    REFERRAL SOURCE  Dr. Chica Jean   Dr. Chica Jean    HISTORY OF PRESENT ILLNESS  Becky Lay is a 63 year old female seen today for evaluation of numbness.  Her symptoms started in November 2023.  Initially the numbness was limited to the left chin but then spread to the left side of her face.  She is also been having intermittent numbness of the left arm and left leg.  Symptoms this could be more of a patchy distribution.  The symptoms on the face and the chin or not there all the time.  Symptoms are sporadic and can be multiple times a week or less than that.  Generally there is no clear provoking factor.  Does complain of occasional tingling in the left arm and leg.  She does have a history of fusion at the C5-C6 level though reports no change in the amount of neck pain that she has.  No recent trauma or any other strokelike symptoms.  No symptoms on the right side.  The symptoms in the legs correlate with the symptoms in the arm/face.    She does have a history of headaches previously diagnosed as tension headaches.  These generally are not at the base of her neck.  There is no associated photophobia or phonophobia.  Occasionally she will get shooting pain up the scalp but occasionally they will go to the temple and wraparound.  They are more of a pressure-like sensation.  She has been on 30 mg of nortriptyline for 3 years.  This was stopped in September as she felt that the headaches have gotten better and she did not need this medication.  Stopping the medication has not changed the frequency of her headaches which are sporadic.    She reports no ongoing stress or anxiety that could be causing her symptoms.    Previous history is reviewed and this is unchanged.    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, 6, 7 DJD    Other and unspecified malignant neoplasm of skin of other and unspecified parts of face    Generalized hyperhidrosis    CARDIOVASCULAR SCREENING; LDL GOAL LESS THAN 160    Menopausal syndrome (hot flashes)    Cervicalgia    PMB (postmenopausal bleeding)    Bilateral occipital neuralgia    Osteopenia of both hips   Significant migraines, arthritis, tremors    FAMILY HISTORY  Family History   Problem Relation Age of Onset    Osteoporosis Mother     Lipids Mother     Dementia Mother     Heart Disease Father         MI age 50/BP and Chol Meds    Hyperlipidemia Father     Cancer Maternal Grandmother         colon    Colon Cancer Maternal Grandmother     Cancer Maternal Grandfather         lung ca   Positive for stroke.  No family history of migraine or complicated migraines.  Some family history of dementia.    SOCIAL HISTORY  Social History     Tobacco Use    Smoking status: Never    Smokeless tobacco: Never   Vaping Use    Vaping Use: Never used   Substance Use Topics    Alcohol use: Yes     Alcohol/week: 10.0 standard drinks of alcohol     Comment: Rarely, a glass of prosecco.    Drug use: No       SYSTEMS REVIEW  Twelve-system ROS was done and other than the HPI this was negative except for numbness/tingling, ringing in the ears, hearing loss.    MEDICATIONS  CITRACAL/VITAMIN D PO, 2 a day  estradiol (ESTRACE) 0.1 MG/GM vaginal cream, PLACE 2 GRAMS VAGINALLY 3 TIMES A WEEK  acetaminophen (TYLENOL) 500 MG tablet, Take 500-1,000 mg by mouth every 6 hours as needed for mild pain (Patient not taking: Reported on 3/8/2024)  ibuprofen (ADVIL/MOTRIN) 200 MG tablet, Take 200 mg by mouth every 4 hours as needed for mild pain (Patient not taking: Reported on 3/8/2024)    No current facility-administered medications on file prior to visit.       PHYSICAL EXAMINATION  VITALS: BP (!)  130/94   Pulse 76   Resp 16   Wt 56.7 kg (125 lb)   LMP 06/20/2008   BMI 20.80 kg/m    GENERAL: Healthy appearing, alert, no acute distress, normal habitus.  CARDIOVASCULAR: Extremities warm and well perfused. Pulses present.   NEUROLOGICAL:  Patient is awake and oriented to self, place and time.  Attention span is normal.  Memory is grossly intact.  Language is fluent and follows commands appropriately.  Appropriate fund of knowledge. Cranial nerves 2-12 are intact. There is no pronator drift.  Motor exam shows 5/5 strength in all extremities.  Tone is symmetric bilaterally in upper and lower extremities.  Reflexes are symmetric and 2+ in upper extremities and lower extremities. Sensory exam is grossly intact to light touch, pin prick and vibration.  Finger to nose and heel to shin is without dysmetria.  Romberg is negative.  Gait is normal and the patient is able to do tandem walk and walk on toes and heels.        DIAGNOSTICS  MRI Brain 2024 images reviewed.      MRI C spine 2019      RELEVANT LABS  Component      Latest Ref Rng 5/5/2023  9:27 AM   TSH      0.30 - 4.20 uIU/mL 2.99        Component      Latest Ref Rng 3/21/2022  10:23 AM 6/16/2022  9:53 AM   WBC      4.0 - 11.0 10e3/uL  5.3    RBC Count      3.80 - 5.20 10e6/uL  4.45    Hemoglobin      11.7 - 15.7 g/dL  12.5    Hematocrit      35.0 - 47.0 %  37.6    MCV      78 - 100 fL  85    MCH      26.5 - 33.0 pg  28.1    MCHC      31.5 - 36.5 g/dL  33.2    RDW      10.0 - 15.0 %  12.5    Platelet Count      150 - 450 10e3/uL  257    % Neutrophils      %  66    % Lymphocytes      %  24    % Monocytes      %  7    % Eosinophils      %  2    % Basophils      %  1    Absolute Neutrophils      1.6 - 8.3 10e3/uL  3.5    Absolute Lymphocytes      0.8 - 5.3 10e3/uL  1.3    Absolute Monocytes      0.0 - 1.3 10e3/uL  0.4    Absolute Eosinophils      0.0 - 0.7 10e3/uL  0.1    Absolute Basophils      0.0 - 0.2 10e3/uL  0.0    Sodium      133 - 144 mmol/L  133     Potassium      3.4 - 5.3 mmol/L  3.8    Chloride      94 - 109 mmol/L  101    Carbon Dioxide      20 - 32 mmol/L  28    Anion Gap      3 - 14 mmol/L  4    Urea Nitrogen      7 - 30 mg/dL  11    Creatinine      0.52 - 1.04 mg/dL  0.73    Calcium      8.5 - 10.1 mg/dL  9.3    Glucose      70 - 99 mg/dL  92    GFR Estimate      >60 mL/min/1.73m2  >90    TSH      0.40 - 4.00 mU/L 3.17     Sed Rate      0 - 30 mm/hr  5          OUTSIDE RECORDS  Outside referral notes and chart notes were reviewed and pertinent information has been summarized (in addition to the HPI):-      [facility]      IMPRESSION/REPORT/PLAN  Neck pain/cervicogenic headaches  Other migraine without status migrainosus, not intractable  Numbness    This is a 63 year old female with history of C5-C6 neck fusion/headaches in the back of the head clinically suggestive of migrainous headaches with intermittent left face arm and leg numbness.  She was previously taking nortriptyline for prevention of headaches and had to stop it because of side effects of constipation.  Subsequently stopping the medication the symptoms of left-sided numbness came on.  Her headaches have not increased in frequency or severity.  Possibly the left-sided numbness could be a migrainous phenomena/complicated migraine without the headache portion.    MRI brain has been negative for structural lesions.  MRI C-spine has not shown any significant stenosis in 2019 the patient wants to hold off on repeating the MRI at this point.  C-spine problem would not explain the facial numbness.  Did offer physical therapy though she wanted to hold off on that for right now.    We will retry the nortriptyline to see if her symptoms go away with that.  If they do that would confirm that the symptoms are more related to her headaches.  Could try other medications where she does not have side effects of constipation.  She denied a stool softener today.    Will check blood work to look  for other causes of numbness.    Can see her back in 3 to 4 months.  Encouraged her to keep a log of her symptoms to identify patterns.    -     nortriptyline (PAMELOR) 10 MG capsule; Take 1 cap/night and then increase by 1 cap/week to a max of 3 caps/night as needed and tolerated.  -     Vitamin B12; Future  -     Vitamin B1 whole blood; Future  -     TSH with free T4 reflex; Future  -     Protein Immunofixation Serum; Future  -     Protein electrophoresis; Future  -     Hemoglobin A1c; Future    Return in about 4 months (around 7/8/2024) for In-Clinic Visit (must).    Over 60 minutes were spent coordinating the care for the patient on the day of the encounter.  This includes previsit, during visit and post visit activities as documented above.  Counseling patient.  Reviewing chart.  Reviewing testing/previous notes.  Testing ordered.  Multiple problems reviewed/addressed.  (Activities include but not inclusive of reviewing chart, reviewing outside records, reviewing labs and imaging study results as well as the images, patient visit time including getting history and exam,  use if applicable, review of test results with the patient and coming up with a plan in a shared model, counseling patient and family, education and answering patient questions, EMR , EMR diagnosis entry and problem list management, medication reconciliation and prescription management if applicable, paperwork if applicable, printing documents and documentation of the visit activities.)        Waylon Live MD  Neurologist  [facility]  Tel:- [phone]    This note was dictated using voice recognition software.  Any grammatical or context distortions are unintentional and inherent to the software.

## 2024-04-02 ENCOUNTER — LAB (OUTPATIENT)
Dept: LAB | Facility: CLINIC | Age: 64
End: 2024-04-02
Payer: COMMERCIAL

## 2024-04-02 DIAGNOSIS — R20.0 NUMBNESS: ICD-10-CM

## 2024-04-02 DIAGNOSIS — R73.9 HYPERGLYCEMIA: ICD-10-CM

## 2024-04-02 LAB
HBA1C MFR BLD: 5.8 % (ref 0–5.6)
TOTAL PROTEIN SERUM FOR ELP: 6.7 G/DL (ref 6.4–8.3)
TSH SERPL DL<=0.005 MIU/L-ACNC: 0.77 UIU/ML (ref 0.3–4.2)
VIT B12 SERPL-MCNC: 560 PG/ML (ref 232–1245)

## 2024-04-02 PROCEDURE — 84425 ASSAY OF VITAMIN B-1: CPT | Mod: 90

## 2024-04-02 PROCEDURE — 83036 HEMOGLOBIN GLYCOSYLATED A1C: CPT

## 2024-04-02 PROCEDURE — 86334 IMMUNOFIX E-PHORESIS SERUM: CPT | Performed by: STUDENT IN AN ORGANIZED HEALTH CARE EDUCATION/TRAINING PROGRAM

## 2024-04-02 PROCEDURE — 84165 PROTEIN E-PHORESIS SERUM: CPT | Performed by: STUDENT IN AN ORGANIZED HEALTH CARE EDUCATION/TRAINING PROGRAM

## 2024-04-02 PROCEDURE — 99000 SPECIMEN HANDLING OFFICE-LAB: CPT

## 2024-04-02 PROCEDURE — 84443 ASSAY THYROID STIM HORMONE: CPT

## 2024-04-02 PROCEDURE — 84155 ASSAY OF PROTEIN SERUM: CPT

## 2024-04-02 PROCEDURE — 36415 COLL VENOUS BLD VENIPUNCTURE: CPT

## 2024-04-02 PROCEDURE — 82607 VITAMIN B-12: CPT

## 2024-04-03 LAB
ALBUMIN SERPL ELPH-MCNC: 4.6 G/DL (ref 3.7–5.1)
ALPHA1 GLOB SERPL ELPH-MCNC: 0.2 G/DL (ref 0.2–0.4)
ALPHA2 GLOB SERPL ELPH-MCNC: 0.6 G/DL (ref 0.5–0.9)
B-GLOBULIN SERPL ELPH-MCNC: 0.7 G/DL (ref 0.6–1)
GAMMA GLOB SERPL ELPH-MCNC: 0.6 G/DL (ref 0.7–1.6)
M PROTEIN SERPL ELPH-MCNC: 0 G/DL
PROT PATTERN SERPL ELPH-IMP: ABNORMAL
PROT PATTERN SERPL IFE-IMP: NORMAL

## 2024-04-06 LAB — VIT B1 PYROPHOSHATE BLD-SCNC: 106 NMOL/L

## 2024-04-25 NOTE — PROGRESS NOTES
"Becky Lay is a 59 year old female who is being evaluated via a billable telephone visit.      The patient has been notified of following:     \"This telephone visit will be conducted via a call between you and your physician/provider. We have found that certain health care needs can be provided without the need for a physical exam.  This service lets us provide the care you need with a short phone conversation.  If a prescription is necessary we can send it directly to your pharmacy.  If lab work is needed we can place an order for that and you can then stop by our lab to have the test done at a later time.    Telephone visits are billed at different rates depending on your insurance coverage. During this emergency period, for some insurers they may be billed the same as an in-person visit.  Please reach out to your insurance provider with any questions.    If during the course of the call the physician/provider feels a telephone visit is not appropriate, you will not be charged for this service.\"    Patient has given verbal consent for Telephone visit?  Yes    What phone number would you like to be contacted at? [phone]    How would you like to obtain your AVS? Niurka    Recommendations at last vist on 5/28/2020:  1.  Pain Physical Therapy:                Completed. Continue to practice gentle stretches and exercises.               2.  Pain Psychologist to address relaxation, behavioral change, coping style, and other factors important to improvement.                     Completed.              3.  Medication Management:                           1.Becky reports some concern about increased need for Flexeril and ibuprofen but is still taking well below maximum dosing. I reassured her that it is safe to continue increased use of ibuprofen and Flexeril for now until able to repeat injections.               4.  Potential procedures: repeat trigger point injections and occipital nerve blocks ordered. We will " I reviewed the pharmacy resident's documentation and discussed the patient with the resident. I agree with the resident medical decision making as documented in the note.     Katja Chisholm, PharmD       complete with a small increase in amount of anesthetic for occipital nerve blocks (occipital nerve blocks in November with 3ml injected bilaterally where injection in January with 2.5ml bilaterally).               5.  Follow up with NANCY Henderson CNP in 8 weeks, okay to push out further if doing well.     Additional provider notes:  -Her pain is worse as it was compared to last visit.   -She was seen in the ED on 6/16/2020 due to severe headache, states her headache worsened after recent injections, she was not able to manage at home.  -She had trigger point injections with bilateral occipital nerve blocks with me on 6/1/2020. Though she had immediate pain relief in headache in office, this subsided over the next couple days and then continued to worsen.   -She continues to take Flexeril and ibuprofen in the morning, feels like she has needed it.   -She wonders what might be recommended next, is somewhat frustrated she is still dealing with persistent chronic pain for a year now.     Imaging:  MRI of cervical spine was completed on 10/29/19 and shows:           Assessment:   Becky Lay is a 59 year old female with a past medical history significant for hyperhidrosis who presents with complaints of neck pain.      1. Neck pain- etiology likely combination of facet arthropathy and occipital neuralgia, s/p C5-6 discectomy and fusion with Dr. oJse at [facility] in March of 2003  2. Mental Health - the patient's mental health concerns, specifically situational depression, affect her experience of pain and contribute to her clinically significant distress.        1. Facet arthropathy, cervical    2. Bilateral occipital neuralgia        Plan:     1.  Pain Physical Therapy:                Completed. Continue to practice gentle stretches and exercises with extra self care.               2.  Pain Psychologist to address relaxation, behavioral change, coping style, and other factors important to  improvement.                     Completed.              3.  Medication Management:                           1. Okay to continue increased use of ibuprofen and Flexeril for now until able to repeat injections.               4.  Potential procedures: we discussed injections at length today. I am suspicious that occipital neuralgia is only a component of her pain origin and that instead, her headaches and neck pain are cervicogenic. She has a C5-6 fusion and facet arthropathy noted at C6-7 and C7-T1 as well as at C4-5. We discussed the cervical medial branch block to RFA process and facet joint injections. I think that the pain may be originating in lower facet levels. I did discuss this patient and her pain with Dr. Cardona, he informed me that the exact injection may need to be decided on the day of the procedure whether facet joint injection or medial branch block to RFA process would be most beneficial. TBD order placed. Patient understands she will either have treatment with facet joint injections or a medial branch block with plan to progress in process.               5.  Follow up with NANCY Henderson CNP in 8 weeks, okay to push out further if doing well.     Phone call duration: 23 minutes    NANCY Singletary CNP

## 2024-05-13 DIAGNOSIS — N95.2 POST-MENOPAUSAL ATROPHIC VAGINITIS: ICD-10-CM

## 2024-05-13 RX ORDER — ESTRADIOL 0.1 MG/G
CREAM VAGINAL
Qty: 42.5 G | Refills: 1 | Status: SHIPPED | OUTPATIENT
Start: 2024-05-13 | End: 2024-08-21

## 2024-05-16 ENCOUNTER — VIRTUAL VISIT (OUTPATIENT)
Dept: PEDIATRICS | Facility: CLINIC | Age: 64
End: 2024-05-16
Payer: COMMERCIAL

## 2024-05-16 DIAGNOSIS — R68.2 DRY MOUTH: Primary | ICD-10-CM

## 2024-05-16 PROCEDURE — 99214 OFFICE O/P EST MOD 30 MIN: CPT | Mod: 95 | Performed by: NURSE PRACTITIONER

## 2024-05-16 NOTE — PROGRESS NOTES
Becky Montenegro is a 63 year old who is being evaluated via a billable video visit.          Assessment & Plan     Dry mouth  Discussed sicca symptoms in older adults can be due partly to medications and partly to age-related atrophy of secreting tissue. No offending medications identified. Recent dental exam without concerns. Recommend avoiding anticholinergics like benadryl if possible. Will start work-up for sjogren's. Referral to ENT.   - Adult ENT  Referral; Future  - Anti Nuclear Le IgG by IFA with Reflex; Future  - CBC with platelets; Future  - Rheumatoid factor; Future  - Basic metabolic panel  (Ca, Cl, CO2, Creat, Gluc, K, Na, BUN); Future  - UA Macroscopic with reflex to Microscopic and Culture - Lab Collect; Future  - SSA Ro ABHAY Antibody IgG; Future          Subjective   Becky Montenegro is a 63 year old, presenting for the following health issues:  Derm Problem      Video Start Time: 8:04 AM    HPI     Presents complaining of dry mouth over the past couple of months, feels like she doesn't have enough saliva. Sometimes has difficulty speaking due to dry mouth. Worse after eating. Dry eyes are chronic. Has had a recent eye exam. Had a recent dental exam, no concerns with her dentition. Notes dry skin but attributes to recent [place] in the [place]. Occasional use of benadryl in the past but doesn't use antihistamines regularly.        Objective       Vitals:  No vitals were obtained today due to virtual visit.    Physical Exam   GENERAL: alert and no distress  EYES: Eyes grossly normal to inspection.  No discharge or erythema, or obvious scleral/conjunctival abnormalities.  RESP: No audible wheeze, cough, or visible cyanosis.    SKIN: Visible skin clear. No significant rash, abnormal pigmentation or lesions.  NEURO: Cranial nerves grossly intact.  Mentation and speech appropriate for age.  PSYCH: Appropriate affect, tone, and pace of words        Video-Visit Details    Type of service:  Video Visit   Video  End Time: 8:34 am  Originating Location (pt. Location): Home    Distant Location (provider location):  Off-site  Platform used for Video Visit: Sharda  Signed Electronically by: NANCY Perkins CNP

## 2024-05-18 ENCOUNTER — LAB (OUTPATIENT)
Dept: LAB | Facility: CLINIC | Age: 64
End: 2024-05-18
Payer: COMMERCIAL

## 2024-05-18 DIAGNOSIS — R68.2 DRY MOUTH: ICD-10-CM

## 2024-05-18 LAB
ALBUMIN UR-MCNC: NEGATIVE MG/DL
APPEARANCE UR: CLEAR
BILIRUB UR QL STRIP: NEGATIVE
COLOR UR AUTO: YELLOW
ERYTHROCYTE [DISTWIDTH] IN BLOOD BY AUTOMATED COUNT: 12.6 % (ref 10–15)
GLUCOSE UR STRIP-MCNC: NEGATIVE MG/DL
HCT VFR BLD AUTO: 35.1 % (ref 35–47)
HGB BLD-MCNC: 11.7 G/DL (ref 11.7–15.7)
HGB UR QL STRIP: ABNORMAL
KETONES UR STRIP-MCNC: NEGATIVE MG/DL
LEUKOCYTE ESTERASE UR QL STRIP: NEGATIVE
MCH RBC QN AUTO: 28.5 PG (ref 26.5–33)
MCHC RBC AUTO-ENTMCNC: 33.3 G/DL (ref 31.5–36.5)
MCV RBC AUTO: 85 FL (ref 78–100)
NITRATE UR QL: NEGATIVE
PH UR STRIP: 6.5 [PH] (ref 5–7)
PLATELET # BLD AUTO: 223 10E3/UL (ref 150–450)
RBC # BLD AUTO: 4.11 10E6/UL (ref 3.8–5.2)
RBC #/AREA URNS AUTO: ABNORMAL /HPF
SP GR UR STRIP: 1.01 (ref 1–1.03)
SQUAMOUS #/AREA URNS AUTO: ABNORMAL /LPF
UROBILINOGEN UR STRIP-ACNC: 0.2 E.U./DL
WBC # BLD AUTO: 7.1 10E3/UL (ref 4–11)
WBC #/AREA URNS AUTO: ABNORMAL /HPF

## 2024-05-18 PROCEDURE — 86038 ANTINUCLEAR ANTIBODIES: CPT

## 2024-05-18 PROCEDURE — 85027 COMPLETE CBC AUTOMATED: CPT

## 2024-05-18 PROCEDURE — 86235 NUCLEAR ANTIGEN ANTIBODY: CPT

## 2024-05-18 PROCEDURE — 81001 URINALYSIS AUTO W/SCOPE: CPT

## 2024-05-18 PROCEDURE — 36415 COLL VENOUS BLD VENIPUNCTURE: CPT

## 2024-05-18 PROCEDURE — 86431 RHEUMATOID FACTOR QUANT: CPT

## 2024-05-18 PROCEDURE — 80048 BASIC METABOLIC PNL TOTAL CA: CPT

## 2024-05-18 PROCEDURE — 86039 ANTINUCLEAR ANTIBODIES (ANA): CPT

## 2024-05-19 LAB
ANION GAP SERPL CALCULATED.3IONS-SCNC: 12 MMOL/L (ref 7–15)
BUN SERPL-MCNC: 10.1 MG/DL (ref 8–23)
CALCIUM SERPL-MCNC: 9.3 MG/DL (ref 8.8–10.2)
CHLORIDE SERPL-SCNC: 96 MMOL/L (ref 98–107)
CREAT SERPL-MCNC: 0.71 MG/DL (ref 0.51–0.95)
DEPRECATED HCO3 PLAS-SCNC: 25 MMOL/L (ref 22–29)
EGFRCR SERPLBLD CKD-EPI 2021: >90 ML/MIN/1.73M2
GLUCOSE SERPL-MCNC: 82 MG/DL (ref 70–99)
POTASSIUM SERPL-SCNC: 4.1 MMOL/L (ref 3.4–5.3)
RHEUMATOID FACT SERPL-ACNC: <10 IU/ML
SODIUM SERPL-SCNC: 133 MMOL/L (ref 135–145)

## 2024-05-20 LAB
ENA SS-A AB SER IA-ACNC: <0.5 U/ML
ENA SS-A AB SER IA-ACNC: NEGATIVE

## 2024-05-21 LAB
ANA PAT SER IF-IMP: ABNORMAL
ANA SER QL IF: ABNORMAL
ANA TITR SER IF: ABNORMAL {TITER}

## 2024-05-23 ENCOUNTER — TRANSFERRED RECORDS (OUTPATIENT)
Dept: HEALTH INFORMATION MANAGEMENT | Facility: CLINIC | Age: 64
End: 2024-05-23
Payer: COMMERCIAL

## 2024-05-23 DIAGNOSIS — R31.21 ASYMPTOMATIC MICROSCOPIC HEMATURIA: Primary | ICD-10-CM

## 2024-06-04 ENCOUNTER — ANCILLARY PROCEDURE (OUTPATIENT)
Dept: MAMMOGRAPHY | Facility: CLINIC | Age: 64
End: 2024-06-04
Attending: NURSE PRACTITIONER
Payer: COMMERCIAL

## 2024-06-04 ENCOUNTER — LAB (OUTPATIENT)
Dept: LAB | Facility: CLINIC | Age: 64
End: 2024-06-04
Payer: COMMERCIAL

## 2024-06-04 DIAGNOSIS — Z12.31 VISIT FOR SCREENING MAMMOGRAM: ICD-10-CM

## 2024-06-04 DIAGNOSIS — R31.21 ASYMPTOMATIC MICROSCOPIC HEMATURIA: ICD-10-CM

## 2024-06-04 LAB
ALBUMIN UR-MCNC: NEGATIVE MG/DL
APPEARANCE UR: CLEAR
BILIRUB UR QL STRIP: NEGATIVE
COLOR UR AUTO: YELLOW
GLUCOSE UR STRIP-MCNC: NEGATIVE MG/DL
HGB UR QL STRIP: ABNORMAL
KETONES UR STRIP-MCNC: NEGATIVE MG/DL
LEUKOCYTE ESTERASE UR QL STRIP: NEGATIVE
NITRATE UR QL: NEGATIVE
PH UR STRIP: 7 [PH] (ref 5–7)
RBC #/AREA URNS AUTO: ABNORMAL /HPF
SP GR UR STRIP: 1.01 (ref 1–1.03)
SQUAMOUS #/AREA URNS AUTO: ABNORMAL /LPF
UROBILINOGEN UR STRIP-ACNC: 0.2 E.U./DL
WBC #/AREA URNS AUTO: ABNORMAL /HPF

## 2024-06-04 PROCEDURE — 77067 SCR MAMMO BI INCL CAD: CPT | Mod: TC | Performed by: RADIOLOGY

## 2024-06-04 PROCEDURE — 81001 URINALYSIS AUTO W/SCOPE: CPT

## 2024-06-04 PROCEDURE — 77063 BREAST TOMOSYNTHESIS BI: CPT | Mod: TC | Performed by: RADIOLOGY

## 2024-06-28 ENCOUNTER — PATIENT OUTREACH (OUTPATIENT)
Dept: CARE COORDINATION | Facility: CLINIC | Age: 64
End: 2024-06-28
Payer: COMMERCIAL

## 2024-07-12 ENCOUNTER — TRANSFERRED RECORDS (OUTPATIENT)
Dept: HEALTH INFORMATION MANAGEMENT | Facility: CLINIC | Age: 64
End: 2024-07-12
Payer: COMMERCIAL

## 2024-07-16 SDOH — HEALTH STABILITY: PHYSICAL HEALTH: ON AVERAGE, HOW MANY MINUTES DO YOU ENGAGE IN EXERCISE AT THIS LEVEL?: 40 MIN

## 2024-07-16 SDOH — HEALTH STABILITY: PHYSICAL HEALTH: ON AVERAGE, HOW MANY DAYS PER WEEK DO YOU ENGAGE IN MODERATE TO STRENUOUS EXERCISE (LIKE A BRISK WALK)?: 3 DAYS

## 2024-07-16 ASSESSMENT — SOCIAL DETERMINANTS OF HEALTH (SDOH): HOW OFTEN DO YOU GET TOGETHER WITH FRIENDS OR RELATIVES?: TWICE A WEEK

## 2024-07-17 ENCOUNTER — OFFICE VISIT (OUTPATIENT)
Dept: PEDIATRICS | Facility: CLINIC | Age: 64
End: 2024-07-17
Payer: COMMERCIAL

## 2024-07-17 VITALS
WEIGHT: 126.6 LBS | DIASTOLIC BLOOD PRESSURE: 65 MMHG | HEART RATE: 74 BPM | RESPIRATION RATE: 16 BRPM | HEIGHT: 65 IN | TEMPERATURE: 97.1 F | OXYGEN SATURATION: 99 % | BODY MASS INDEX: 21.09 KG/M2 | SYSTOLIC BLOOD PRESSURE: 115 MMHG

## 2024-07-17 DIAGNOSIS — Z00.00 ENCOUNTER FOR MEDICARE ANNUAL WELLNESS EXAM: Primary | ICD-10-CM

## 2024-07-17 DIAGNOSIS — N94.10 DYSPAREUNIA IN FEMALE: ICD-10-CM

## 2024-07-17 DIAGNOSIS — Z13.6 ENCOUNTER FOR SCREENING FOR CARDIOVASCULAR DISORDERS: ICD-10-CM

## 2024-07-17 LAB
CHOLEST SERPL-MCNC: 205 MG/DL
FASTING STATUS PATIENT QL REPORTED: YES
FASTING STATUS PATIENT QL REPORTED: YES
GLUCOSE SERPL-MCNC: 92 MG/DL (ref 70–99)
HDLC SERPL-MCNC: 69 MG/DL
LDLC SERPL CALC-MCNC: 116 MG/DL
NONHDLC SERPL-MCNC: 136 MG/DL
TRIGL SERPL-MCNC: 100 MG/DL

## 2024-07-17 PROCEDURE — 36415 COLL VENOUS BLD VENIPUNCTURE: CPT | Performed by: NURSE PRACTITIONER

## 2024-07-17 PROCEDURE — 80061 LIPID PANEL: CPT | Performed by: NURSE PRACTITIONER

## 2024-07-17 PROCEDURE — G0438 PPPS, INITIAL VISIT: HCPCS | Performed by: NURSE PRACTITIONER

## 2024-07-17 PROCEDURE — 82947 ASSAY GLUCOSE BLOOD QUANT: CPT | Performed by: NURSE PRACTITIONER

## 2024-07-17 RX ORDER — RESPIRATORY SYNCYTIAL VIRUS VACCINE 120MCG/0.5
0.5 KIT INTRAMUSCULAR ONCE
Qty: 1 EACH | Refills: 0 | Status: CANCELLED | OUTPATIENT
Start: 2024-07-17 | End: 2024-07-17

## 2024-07-17 ASSESSMENT — PAIN SCALES - GENERAL: PAINLEVEL: NO PAIN (0)

## 2024-07-17 NOTE — PROGRESS NOTES
Preventive Care Visit  [facility] NANCY Puentes CNP, Family Medicine  Jul 17, 2024      Assessment & Plan     Encounter for Medicare annual wellness exam  Age appropriate screening and preventative care have been addressed today. Vaccinations have been reviewed and discussed. She was instructed to present to her retail pharmacy for vaccines. Patient has been advised to follow a balanced diet with adequate calcium. They have been advised to undertake routine aerobic activity. Recommend annual vision exams as well as biannual dental exams. They will follow up for annual physical again in one year.   - Glucose  - Colonoscopy 7/13/2023 - repeat in 7 years.   - Mammo - utd  - Pap - negative co-testing in 2019, 7/23/2014 - okay to stop screening.   - DEXA - 5/5/23, osteopenia, repeat in 2 years.     Encounter for screening for cardiovascular disorders  - Lipid panel reflex to direct LDL Fasting    Dyspareunia in female  Improved with vaginal estrogen cream. Has some new discomfort over the past couple of months, located within the vulva. She will try to use the vaginal cream with regularity and increase by 1x/week to see if this makes a difference. [facility] at which time I would consider OB/GYN e-consult.            Counseling  Appropriate preventive services were addressed with this patient via screening, questionnaire, or discussion as appropriate for fall prevention, nutrition, physical activity, Tobacco-use cessation, weight loss and cognition.  Checklist reviewing preventive services available has been given to the patient.  Reviewed patient's diet, addressing concerns and/or questions.   She is at risk for lack of exercise and has been provided with information to increase physical activity for the benefit of her well-being.   She is at risk for psychosocial distress and has been provided with information to reduce risk.   The patient was provided with  written information regarding signs of hearing loss.         Subjective   Syd is a 64 year old, presenting for the following:  Physical            7/17/2024    10:07 AM   Additional Questions   Roomed by Pilar LARKIN   Accompanied by None         7/17/2024    10:07 AM   Patient Reported Additional Medications   Patient reports taking the following new medications None       Health Care Directive  Patient does not have a Health Care Directive or Living Will: Sent [employer] message about this to patient following out visit, await her response.     HPI    Dry mouth - saw [facility]. They did offer biopsy or CT scan but she doesn't feel like she is was at that point yet.     Colonoscopy 7/13/2023 - repeat in 7 years.   Mammo - utd  Pap - negative co-testing in 2019, 7/23/2014 - okay to stop screening.   DEXA - 5/5/23, osteopenia, repeat in 2 years.     History of dyspareunia which has seemingly improved with the use of topical vagina estrogen. Over the past couple of months she has noticed inability to achieve climax, which is new for her. Has new discomfort to parts of her vulva.         7/16/2024   General Health   How would you rate your overall physical health? Good   Feel stress (tense, anxious, or unable to sleep) Only a little      (!) STRESS CONCERN      7/16/2024   Nutrition   Diet: Regular (no restrictions)            7/16/2024   Exercise   Days per week of moderate/strenous exercise 3 days   Average minutes spent exercising at this level 40 min            7/16/2024   Social Factors   Frequency of gathering with friends or relatives Twice a week   Worry food won't last until get money to buy more No   Food not last or not have enough money for food? No   Do you have housing? (Housing is defined as stable permanent housing and does not include staying ouside in a car, in a tent, in an abandoned building, in an overnight shelter, or couch-surfing.) Yes   Are you worried about losing your housing? No   Lack of  transportation? No   Unable to get utilities (heat,electricity)? No            7/16/2024   Fall Risk   Fallen 2 or more times in the past year? No   Trouble with walking or balance? No             7/16/2024   Activities of Daily Living- Home Safety   Needs help with the following daily activites None of the above   Safety concerns in the home None of the above            7/16/2024   Dental   Dentist two times every year? Yes            7/16/2024   Hearing Screening   Hearing concerns? (!) TROUBLE UNDERSTANDING SOFT OR WHISPERED SPEECH.            7/16/2024   Driving Risk Screening   Patient/family members have concerns about driving No            7/16/2024   General Alertness/Fatigue Screening   Have you been more tired than usual lately? No            7/16/2024   Urinary Incontinence Screening   Bothered by leaking urine in past 6 months No            7/16/2024   TB Screening   Were you born outside of the [place]? No            Today's PHQ-2 Score:       7/16/2024     4:42 PM   PHQ-2 ( 1999 [employer])   Q1: Little interest or pleasure in doing things 0   Q2: Feeling down, depressed or hopeless 0   PHQ-2 Score 0   Q1: Little interest or pleasure in doing things Not at all   Q2: Feeling down, depressed or hopeless Not at all   PHQ-2 Score 0           7/16/2024   Substance Use   Alcohol more than 3/day or more than 7/wk Not Applicable   Do you have a current opioid prescription? No   How severe/bad is pain from 1 to 10? 0/10 (No Pain)   Do you use any other substances recreationally? No        Social History     Tobacco Use    Smoking status: Never    Smokeless tobacco: Never   Vaping Use    Vaping status: Never Used   Substance Use Topics    Alcohol use: Yes     Alcohol/week: 10.0 standard drinks of alcohol     Comment: Rarely, a glass of prosecco.    Drug use: No           6/4/2024   LAST FHS-7 RESULTS   1st degree relative breast or ovarian cancer No   Any relative bilateral breast cancer No   Any male have breast cancer  No   Any ONE woman have BOTH breast AND ovarian cancer No   Any woman with breast cancer before 50yrs No   2 or more relatives with breast AND/OR ovarian cancer No   2 or more relatives with breast AND/OR bowel cancer No           Mammogram Screening - Mammogram every 1-2 years updated in Health Maintenance based on mutual decision making      History of abnormal Pap smear: No - age 65 or older with pap indicated due to inadequate prior screening (3 consecutive negative cytology results, 2 consecutive negative cotesting results, or 2 consecutive negative HrHPV test results within 10 years, with the most recent test occurring within the recommended screening interval for the test used)        Latest Ref Rng & Units 9/17/2019     6:24 PM 9/17/2019     1:37 PM 7/23/2014    12:00 AM   PAP / HPV   PAP (Historical)   NIL  NIL    HPV 16 DNA NEG^Negative Negative      HPV 18 DNA NEG^Negative Negative      Other HR HPV NEG^Negative Negative        ASCVD Risk   The 10-year ASCVD risk score (Juan C STUART, et al., 2019) is: 5%    Values used to calculate the score:      Age: 64 years      Sex: Female      Is Non- : No      Diabetic: No      Tobacco smoker: No      Systolic Blood Pressure: 115 mmHg      Is BP treated: No      HDL Cholesterol: 74 mg/dL      Total Cholesterol: 243 mg/dL            Reviewed and updated as needed this visit by Provider                    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, 6, 7 DJD    Other and unspecified malignant neoplasm of skin of other and unspecified parts of face    Generalized hyperhidrosis    CARDIOVASCULAR SCREENING; LDL GOAL LESS THAN 160    Menopausal syndrome (hot flashes)    Cervicalgia    PMB (postmenopausal bleeding)    Bilateral occipital neuralgia    Osteopenia of both hips     Past Surgical History:   Procedure Laterality Date    HEAD & NECK SURGERY  3/2003    Discectomy and fusion    LAI VASQUES W/O FACETEC FORAMOT/DSKC 1/2 VRT SEG, CERVICAL   2004    anterior c- 5, 6 fusion       Social History     Tobacco Use    Smoking status: Never    Smokeless tobacco: Never   Substance Use Topics    Alcohol use: Yes     Alcohol/week: 10.0 standard drinks of alcohol     Comment: Rarely, a glass of prosecco.     Family History   Problem Relation Age of Onset    Osteoporosis Mother     Lipids Mother     Dementia Mother     Heart Disease Father         MI age 50/BP and Chol Meds    Hyperlipidemia Father     Cancer Maternal Grandmother         colon    Colon Cancer Maternal Grandmother     Cancer Maternal Grandfather         lung ca         Current providers sharing in care for this patient include:  Patient Care Team:  Chica Jean APRN CNP as PCP - General (Family Medicine)  Chica Jean APRN CNP as Assigned PCP  Stacy Bailey PA-C as Physician Assistant (Gastroenterology)  Stacy Bailey PA-C as Assigned Cancer Care Provider  Waylon Live MD as MD (Neurology)  Waylon Live MD as Assigned Neuroscience Provider  Leanna Duarte MD as MD (Ophthalmology)  [facility]    The following health maintenance items are reviewed in Epic and correct as of today:  Health Maintenance   Topic Date Due    RSV VACCINE (Pregnancy & 60+) (1 - 1-dose 60+ series) Never done    ANNUAL REVIEW OF HM ORDERS  03/21/2023    Pneumococcal Vaccine: 65+ Years (1 of 1 - PCV) Never done    ADVANCE CARE PLANNING  08/06/2023    COVID-19 Vaccine (5 - 2023-24 season) 09/01/2023    MEDICARE ANNUAL WELLNESS VISIT  05/05/2024    INFLUENZA VACCINE (1) 09/01/2024    MAMMO SCREENING  06/04/2025    FALL RISK ASSESSMENT  07/17/2025    GLUCOSE  05/18/2027    LIPID  05/05/2028    DTAP/TDAP/TD IMMUNIZATION (3 - Td or Tdap) 06/29/2028    COLORECTAL CANCER SCREENING  07/13/2030    DEXA  05/05/2038    HEPATITIS C SCREENING  Completed    PHQ-2 (once per calendar year)  Completed    ZOSTER IMMUNIZATION  Completed    IPV IMMUNIZATION  Aged Out    HPV  "IMMUNIZATION  Aged Out    MENINGITIS IMMUNIZATION  Aged Out    RSV MONOCLONAL ANTIBODY  Aged Out    PAP  Discontinued            Objective    Exam  /65 (BP Location: Right arm, Patient Position: Sitting, Cuff Size: Adult Regular)   Pulse 74   Temp 97.1  F (36.2  C) (Tympanic)   Resp 16   Ht 1.645 m (5' 4.76\")   Wt 57.4 kg (126 lb 9.6 oz)   LMP 06/20/2008   SpO2 99%   BMI 21.22 kg/m     Estimated body mass index is 21.22 kg/m  as calculated from the following:    Height as of this encounter: 1.645 m (5' 4.76\").    Weight as of this encounter: 57.4 kg (126 lb 9.6 oz).    Physical Exam  Constitutional: appears to be in no acute distress, comfortable, pleasant.   Eyes: anicteric, conjunctiva clear without drainage or erythema. SHUBHAM.   Ears, Nose and Throat: tympanic membranes gray with LR,  nose without nasal discharge. OP: no erythema to posterior pharynx, negative post nasal drainage, tonsils +1 no erythema or exudate.  Neck: supple, thyroid palpable,not enlarged, no nodules   Breast: Exam deferred (deferred after discussion of exam options with patient, no symptoms or concerns).   Cardiovascular: regular rate and rhythm, normal S1 and S2, no murmurs, rubs or gallops, peripheral pulses full and symmetric; negative peripheral edema   Respiratory: Air entry throughout. Breathing pattern unlabored without the use of accessory muscles. Clear to auscultation A and P, no wheezes or crackles, normal breath sounds.    Gastrointestinal: rounded, soft. Positive bowel sounds x4, nontender, no masses.   Genitourinary: Exam deferred (deferred after discussion of exam options with patient, no symptoms or concerns, pap not indicated due to age).   Musculoskeletal: full range of motion, no edema.   Skin: pink, turgor smooth and elastic. Negative for lesions or dryness.  Neurological: normal gait, no tremor.   Psychological: appropriate mood and affect.   Lymphatic: no cervical, axillary, supraclavicular, or " infraclavicular lymphadenopathy.          7/17/2024   Mini Cog   Clock Draw Score 2 Normal   3 Item Recall 3 objects recalled   Mini Cog Total Score 5                7/17/2024   Vision Screen   Reason Vision Screen Not Completed Patient had exam in last 12 months          Signed Electronically by: NANCY Perkins CNP

## 2024-08-20 DIAGNOSIS — N95.2 POST-MENOPAUSAL ATROPHIC VAGINITIS: ICD-10-CM

## 2024-08-21 RX ORDER — ESTRADIOL 0.1 MG/G
CREAM VAGINAL
Qty: 42.5 G | Refills: 1 | Status: SHIPPED | OUTPATIENT
Start: 2024-08-21

## 2024-08-28 ENCOUNTER — TRANSFERRED RECORDS (OUTPATIENT)
Dept: HEALTH INFORMATION MANAGEMENT | Facility: CLINIC | Age: 64
End: 2024-08-28
Payer: COMMERCIAL

## 2024-10-03 ENCOUNTER — OFFICE VISIT (OUTPATIENT)
Dept: PEDIATRICS | Facility: CLINIC | Age: 64
End: 2024-10-03
Payer: COMMERCIAL

## 2024-10-03 VITALS
BODY MASS INDEX: 20.93 KG/M2 | DIASTOLIC BLOOD PRESSURE: 66 MMHG | TEMPERATURE: 98.2 F | RESPIRATION RATE: 16 BRPM | OXYGEN SATURATION: 98 % | HEIGHT: 65 IN | SYSTOLIC BLOOD PRESSURE: 114 MMHG | WEIGHT: 125.6 LBS | HEART RATE: 66 BPM

## 2024-10-03 DIAGNOSIS — Z23 NEED FOR PROPHYLACTIC VACCINATION AND INOCULATION AGAINST INFLUENZA: Primary | ICD-10-CM

## 2024-10-03 DIAGNOSIS — K21.00 GASTROESOPHAGEAL REFLUX DISEASE WITH ESOPHAGITIS, UNSPECIFIED WHETHER HEMORRHAGE: ICD-10-CM

## 2024-10-03 PROCEDURE — 90662 IIV NO PRSV INCREASED AG IM: CPT | Performed by: INTERNAL MEDICINE

## 2024-10-03 PROCEDURE — G0008 ADMIN INFLUENZA VIRUS VAC: HCPCS | Performed by: INTERNAL MEDICINE

## 2024-10-03 PROCEDURE — G2211 COMPLEX E/M VISIT ADD ON: HCPCS | Performed by: INTERNAL MEDICINE

## 2024-10-03 PROCEDURE — 99214 OFFICE O/P EST MOD 30 MIN: CPT | Performed by: INTERNAL MEDICINE

## 2024-10-03 ASSESSMENT — PAIN SCALES - GENERAL: PAINLEVEL: NO PAIN (0)

## 2024-10-03 NOTE — PROGRESS NOTES
Assessment & Plan     Gastroesophageal reflux disease with esophagitis, unspecified whether hemorrhage  Long discussion with patient today - given improvement in her symptoms with short course PPI, I do think it is reasonable to proceed with longer course of PPI therapy to see if this is helpful at fully treating symptoms. If symptoms recur following treatment would pursue endoscopy to rule out ulcer disease, EE, or other underlying pathology contributing to symptoms. Did discuss H pylori testing, but given marked improvement with PPI (and recent use of PPI) will hold off on testing for now, and if symptoms recur would plan for H pylori biopsies with endoscopy.     Need for prophylactic vaccination and inoculation against influenza  Vaccine given today.             Patient Instructions   Restart prilosec and take for at least a month (ideally until symptoms are fully gone and then for another week). If symptoms recur after stopping let us know and we can order the endoscopy.     Make sure to take the medication 30-60 minutes before eating.     You can also try Pepcid (famotidine), and dietary changes (avoid chocolate, alcohol, spicy food, citrus, etc).     Subjective   Syd is a 64 year old, presenting for the following health issues:  Gastrointestinal Problem        10/3/2024     3:42 PM   Additional Questions   Roomed by Ana Lilia   Accompanied by none         10/3/2024     3:42 PM   Patient Reported Additional Medications   Patient reports taking the following new medications none     History of Present Illness       Reason for visit:  GI issues  Symptom onset:  More than a month  Symptoms include:  Burning and discomfort in upper abdomen, nausea fatigue  Symptom intensity:  Moderate  Symptom progression:  Staying the same  Had these symptoms before:  No  What makes it worse:  Caffeine, spicy foods  What makes it better:  Prilosec and tums, bland foods   She is taking medications regularly.     Syd comes in  "for evaluation of ongoing epigastric burning and pain, as well as nausea and fatigue. Symptoms started over the summer following a particularly stressful period when she was having more stomach discomfort, upset and bloating. Stress has improved, but she still has the burning and pain as well as fatigue and nausea. Tried 2 weeks of omeprazole in August with recurrence of symptoms in Sept; repeated x2 weeks in Sept and now with recurrence of symptoms again.    No recent travel or exposure to foreign foods or close contact with H pylori positive individuals. No weight loss, dark stools. No excessive ibuprofen use. She has been avoiding caffeine and spicy foods which helps somewhat.             Review of Systems  Constitutional, gi and gu systems are negative, except as otherwise noted.      Objective    /66 (BP Location: Right arm, Patient Position: Sitting, Cuff Size: Adult Regular)   Pulse 66   Temp 98.2  F (36.8  C) (Tympanic)   Resp 16   Ht 1.651 m (5' 5\")   Wt 57 kg (125 lb 9.6 oz)   LMP 06/20/2008 (Exact Date)   SpO2 98%   BMI 20.90 kg/m    Body mass index is 20.9 kg/m .  Physical Exam   GENERAL: alert and no distress            Signed Electronically by: Hanna Burch MD      30 minutes spent by me on the date of the encounter doing chart review, history and exam, documentation and further activities per the note    "

## 2024-10-03 NOTE — PATIENT INSTRUCTIONS
Restart prilosec and take for at least a month (ideally until symptoms are fully gone and then for another week). If symptoms recur after stopping let us know and we can order the endoscopy.     Make sure to take the medication 30-60 minutes before eating.     You can also try Pepcid (famotidine), and dietary changes (avoid chocolate, alcohol, spicy food, citrus, etc).

## 2024-10-24 NOTE — DISCHARGE INSTRUCTIONS
Discharge Instructions  Headache    You were seen today for a headache. Headaches may be caused by many different things such as muscle tension, sinus inflammation, anxiety and stress, having too little sleep, too much alcohol, some medical conditions or injury. You may have a migraine, which is caused by changes in the blood vessels in your head.  At this time your provider does not find that your headache is a sign of anything dangerous or life-threatening.  However, sometimes the signs of serious illness do not show up right away.      Generally, every Emergency Department visit should have a follow-up clinic visit with either a primary or a specialty clinic/provider. Please follow-up as instructed by your emergency provider today.    Return to the Emergency Department if:  You get a new fever of 100.4 F or higher.  Your headache gets much worse.  You get a stiff neck with your headache.  You get a new headache that is significantly different or worse than headaches you have had before.  You are vomiting (throwing up) and cannot keep food or water down.  You have blurry or double vision or other problems with your eyes.  You have a new weakness on one side of your body.  You have difficulty with balance which is new.  You or your family thinks you are confused.  You have a seizure.    What can I do to help myself?  Pain medications - You may take a pain medication such as Tylenol  (acetaminophen), Advil , Motrin  (ibuprofen) or Aleve  (naproxen).  Take a pain reliever as soon as you notice symptoms.  Starting medications as soon as you start to have symptoms may lessen the amount of pain you have.  Relaxing in a quiet, dark room may help.  Get enough sleep and eat meals regularly.  You may need to watch for certain foods or other things which may trigger your headaches.  Keeping a journal of your headaches and possible triggers may help you and your primary provider to identify things which you should avoid which  may be causing your headaches.  If you were given a prescription for medicine here today, be sure to read all of the information (including the package insert) that comes with your prescription.  This will include important information about the medicine, its side effects, and any warnings that you need to know about.  The pharmacist who fills the prescription can provide more information and answer questions you may have about the medicine.  If you have questions or concerns that the pharmacist cannot address, please call or return to the Emergency Department.   Remember that you can always come back to the Emergency Department if you are not able to see your regular provider in the amount of time listed above, if you get any new symptoms, or if there is anything that worries you.     Other

## 2024-10-28 ENCOUNTER — E-VISIT (OUTPATIENT)
Dept: PEDIATRICS | Facility: CLINIC | Age: 64
End: 2024-10-28
Payer: COMMERCIAL

## 2024-10-28 DIAGNOSIS — R14.0 ABDOMINAL DISTENSION: Primary | ICD-10-CM

## 2024-10-28 PROCEDURE — 99207 PR NON-BILLABLE SERV PER CHARTING: CPT

## 2024-10-30 ENCOUNTER — OFFICE VISIT (OUTPATIENT)
Dept: PEDIATRICS | Facility: CLINIC | Age: 64
End: 2024-10-30
Payer: COMMERCIAL

## 2024-10-30 VITALS
RESPIRATION RATE: 16 BRPM | TEMPERATURE: 96.8 F | WEIGHT: 124.5 LBS | SYSTOLIC BLOOD PRESSURE: 118 MMHG | BODY MASS INDEX: 20.74 KG/M2 | HEIGHT: 65 IN | DIASTOLIC BLOOD PRESSURE: 78 MMHG | HEART RATE: 68 BPM | OXYGEN SATURATION: 99 %

## 2024-10-30 DIAGNOSIS — K21.00 GASTROESOPHAGEAL REFLUX DISEASE WITH ESOPHAGITIS, UNSPECIFIED WHETHER HEMORRHAGE: Primary | ICD-10-CM

## 2024-10-30 DIAGNOSIS — R14.0 BLOATING: ICD-10-CM

## 2024-10-30 DIAGNOSIS — R19.8 ABDOMINAL FULLNESS: ICD-10-CM

## 2024-10-30 PROCEDURE — 99213 OFFICE O/P EST LOW 20 MIN: CPT | Performed by: PHYSICIAN ASSISTANT

## 2024-10-30 PROCEDURE — G2211 COMPLEX E/M VISIT ADD ON: HCPCS | Performed by: PHYSICIAN ASSISTANT

## 2024-10-30 ASSESSMENT — PAIN SCALES - GENERAL: PAINLEVEL_OUTOF10: MODERATE PAIN (4)

## 2024-10-30 NOTE — PROGRESS NOTES
Assessment & Plan     Gastroesophageal reflux disease with esophagitis, unspecified whether hemorrhage  Continue on omeprazole 20mg once daily. Consider increasing to 40mg. Pt declined today.   Take famotidine up to twice daily as needed for break through symptoms.   Recommend US abdomen to r/o gallbladder/pancreas concerns.   If not improved recommend EGD, ordered today for scheduling purposes.   Will complete H pylori testing at this time.  Pt can send update via [employer] as needed for symptoms.  - US Abdomen Limited  - Helicobacter pylori Antigen Stool  - Adult GI  Referral - Procedure Only  - Helicobacter pylori Antigen Stool    Abdominal fullness  Bloating  - US Abdomen Limited  - Adult GI  Referral - Procedure Only    The longitudinal plan of care for the diagnosis(es)/condition(s) as documented were addressed during this visit. Due to the added complexity in care, I will continue to support Syd in the subsequent management and with ongoing continuity of care with PCP.    FUTURE APPOINTMENTS:       - Follow-up visit pending imaging and lab studies    Subjective   Syd is a 64 year old, presenting for the following health issues:  Abdominal Pain        10/30/2024     9:45 AM   Additional Questions   Roomed by elsa LARKIN   Accompanied by None         10/30/2024     9:45 AM   Patient Reported Additional Medications   Patient reports taking the following new medications omeprazole     History of Present Illness       Reason for visit:  Increasing symptoms of abdominal discomfort.  Symptom onset:  More than a month  Symptoms include:  Reflux, abdominal, nausea, bloating, feeling of fullness  Symptom progression:  Worsening  Prior treatment description:  Omeprazole  What makes it better:  Dietary changes (unsure if helpful)    She eats 4 or more servings of fruits and vegetables daily.She consumes 2 sweetened beverage(s) daily.She exercises with enough effort to increase her heart rate 30 to  "60 minutes per day.  She exercises with enough effort to increase her heart rate 4 days per week.   She is taking medications regularly.     Pt is a 64 y.o.who presents for ongoing abdominal bloating, fullness, epigastric pain. She was started on omeprazole 20mg about 3-4 weeks ago and has seen improvement in acid reflux symptoms. She does still get some break through symptoms. She has tried to avoid certain foods. She does not find pain worse before or after food. States discomfort with clothes that she is wearing. States overall just not feeling the best. Has been going on since July. Reports stools are regular and formed.    Review of Systems  Constitutional, HEENT, cardiovascular, pulmonary, gi and gu systems are negative, except as otherwise noted.      Objective    /78 (BP Location: Right arm, Patient Position: Sitting, Cuff Size: Adult Regular)   Pulse 68   Temp 96.8  F (36  C) (Tympanic)   Resp 16   Ht 1.651 m (5' 5\")   Wt 56.5 kg (124 lb 8 oz)   LMP 06/20/2008 (Exact Date)   SpO2 99%   BMI 20.72 kg/m    Body mass index is 20.72 kg/m .    Physical Exam   GENERAL: alert and no distress  NECK: no adenopathy, no asymmetry, masses, or scars  RESP: lungs clear to auscultation - no rales, rhonchi or wheezes  CV: regular rate and rhythm, normal S1 S2, no S3 or S4, no murmur, click or rub, no peripheral edema  ABDOMEN: soft, mild epigastric pain, no hepatosplenomegaly, no masses and bowel sounds normal  MS: no gross musculoskeletal defects noted, no edema  SKIN: no suspicious lesions or rashes  PSYCH: mentation appears normal, affect normal/bright        Signed Electronically by: Kasandra Santo PA-C    "

## 2024-10-30 NOTE — PATIENT INSTRUCTIONS
US Abdomen -will call you to schedule     H pylori stool testing today     Order EGD for future, can cancel if symptoms improve or other tests show something    Can take famotidine 20mg twice daily as needed for break through reflux symptoms.     Can consider increasing omeprazole to 40mg once daily

## 2024-10-31 ENCOUNTER — HOSPITAL ENCOUNTER (OUTPATIENT)
Dept: ULTRASOUND IMAGING | Facility: CLINIC | Age: 64
Discharge: HOME | End: 2024-10-31
Attending: PHYSICIAN ASSISTANT | Admitting: PHYSICIAN ASSISTANT
Payer: COMMERCIAL

## 2024-10-31 DIAGNOSIS — K21.00 GASTROESOPHAGEAL REFLUX DISEASE WITH ESOPHAGITIS, UNSPECIFIED WHETHER HEMORRHAGE: ICD-10-CM

## 2024-10-31 DIAGNOSIS — R14.0 BLOATING: ICD-10-CM

## 2024-10-31 DIAGNOSIS — R19.8 ABDOMINAL FULLNESS: ICD-10-CM

## 2024-10-31 PROCEDURE — 76705 ECHO EXAM OF ABDOMEN: CPT

## 2024-10-31 PROCEDURE — 87338 HPYLORI STOOL AG IA: CPT | Performed by: PHYSICIAN ASSISTANT

## 2024-11-01 ENCOUNTER — TRANSFERRED RECORDS (OUTPATIENT)
Dept: HEALTH INFORMATION MANAGEMENT | Facility: CLINIC | Age: 64
End: 2024-11-01
Payer: COMMERCIAL

## 2024-11-04 ENCOUNTER — TELEPHONE (OUTPATIENT)
Dept: GASTROENTEROLOGY | Facility: CLINIC | Age: 64
End: 2024-11-04
Payer: COMMERCIAL

## 2024-11-04 ENCOUNTER — HOSPITAL ENCOUNTER (OUTPATIENT)
Facility: CLINIC | Age: 64
End: 2024-11-04
Attending: INTERNAL MEDICINE | Admitting: INTERNAL MEDICINE
Payer: COMMERCIAL

## 2024-11-04 LAB — H PYLORI AG STL QL IA: NEGATIVE

## 2024-11-04 NOTE — TELEPHONE ENCOUNTER
"Endoscopy Scheduling Screen    Have you had any respiratory illness or flu-like symptoms in the last 10 days?  No    What is your communication preference for Instructions and/or Bowel Prep?   MyChart    What insurance is in the chart?  Other:  [employer]    Ordering/Referring Provider: Kasandra Santo PA-C in  IM/PEDS   (If ordering provider performs procedure, schedule with ordering provider unless otherwise instructed. )    BMI: Estimated body mass index is 20.72 kg/m  as calculated from the following:    Height as of 10/30/24: 1.651 m (5' 5\").    Weight as of 10/30/24: 56.5 kg (124 lb 8 oz).     Sedation Ordered  moderate sedation.   If patient BMI > 50 do not schedule in ASC.    If patient BMI > 45 do not schedule at [facility].    Are you taking methadone or Suboxone?  NO, No RN review required.    Have you been diagnosed and are being treated for severe PTSD or severe anxiety?  NO, No RN review required.    Are you taking any prescription medications for pain 3 or more times per week?   NO, No RN review required.    Do you have a history of malignant hyperthermia?  No    (Females) Are you currently pregnant?   No     Have you been diagnosed or told you have pulmonary hypertension?   No    Do you have an LVAD?  No    Have you been told you have moderate to severe sleep apnea?  No.    Have you been told you have COPD, asthma, or any other lung disease?  No    Do you have any heart conditions?  No     Have you ever had or are you waiting for an organ transplant?  No. Continue scheduling, no site restrictions.    Have you had a stroke or transient ischemic attack (TIA aka \"mini stroke\" in the last 6 months?   No    Have you been diagnosed with or been told you have cirrhosis of the liver?   No.    Are you currently on dialysis?   No    Do you need assistance transferring?   No    BMI: Estimated body mass index is 20.72 kg/m  as calculated from the following:    Height as of 10/30/24: 1.651 m (5' 5\").    Weight as of " 10/30/24: 56.5 kg (124 lb 8 oz).     Is patients BMI > 40 and scheduling location [facility]?  No    Do you take an injectable or oral medication for weight loss or diabetes (excluding insulin)?  No    Do you take the medication Naltrexone?  No    Do you take blood thinners?  No       Prep   Are you currently on dialysis or do you have chronic kidney disease?  No    Do you have a diagnosis of diabetes?  No    Do you have a diagnosis of cystic fibrosis (CF)?  No    On a regular basis do you go 3 -5 days between bowel movements?  No    BMI > 40?  No    Preferred Pharmacy:    [id number] - [place], [place] - [address] AT [facility] [facility] [address]  [address]  [place] [address]  Phone: [phone] Fax: [phone]    Final Scheduling Details     Procedure scheduled  Upper endoscopy (EGD)    Surgeon:  Randi     Date of procedure:  11.26.24     Pre-OP / PAC:   No - Not required for this site.    Location  [facility] - Patient preference.    Sedation   Moderate Sedation - Per order.      Patient Reminders:   You will receive a call from a Nurse to review instructions and health history.  This assessment must be completed prior to your procedure.  Failure to complete the Nurse assessment may result in the procedure being cancelled.      On the day of your procedure, please designate an adult(s) who can drive you home stay with you for the next 24 hours. The medicines used in the exam will make you sleepy. You will not be able to drive.      You cannot take public transportation, ride share services, or non-medical taxi service without a responsible caregiver.  Medical transport services are allowed with the requirement that a responsible caregiver will receive you at your destination.  We require that drivers and caregivers are confirmed prior to your procedure.

## 2024-11-05 ENCOUNTER — TELEPHONE (OUTPATIENT)
Dept: GASTROENTEROLOGY | Facility: CLINIC | Age: 64
End: 2024-11-05
Payer: COMMERCIAL

## 2024-11-05 NOTE — TELEPHONE ENCOUNTER
Caller: Monika    Reason for Reschedule/Cancellation   (please be detailed, any staff messages or encounters to note?): patient got in sooner at [facility]      Prior to reschedule please review:  Ordering Provider: Gal Dillard Determined: CS  Does patient have any ASC Exclusions, please identify?: N      Notes on Cancelled Procedure:  Procedure: Upper Endoscopy [EGD]   Date: 11/26/24  Location: [facility]; [address] [place] [address]  Surgeon: Randi      Rescheduled: No,         Did you cancel or rescheduled an EUS procedure? No.

## 2025-01-07 DIAGNOSIS — N95.2 POST-MENOPAUSAL ATROPHIC VAGINITIS: ICD-10-CM

## 2025-01-08 ENCOUNTER — TRANSFERRED RECORDS (OUTPATIENT)
Dept: HEALTH INFORMATION MANAGEMENT | Facility: CLINIC | Age: 65
End: 2025-01-08
Payer: COMMERCIAL

## 2025-01-08 RX ORDER — ESTRADIOL 0.1 MG/G
CREAM VAGINAL
Qty: 42.5 G | Refills: 1 | Status: SHIPPED | OUTPATIENT
Start: 2025-01-08

## 2025-02-06 ENCOUNTER — OFFICE VISIT (OUTPATIENT)
Dept: PEDIATRICS | Facility: CLINIC | Age: 65
End: 2025-02-06
Payer: COMMERCIAL

## 2025-02-06 VITALS
TEMPERATURE: 98.1 F | DIASTOLIC BLOOD PRESSURE: 84 MMHG | WEIGHT: 125.1 LBS | BODY MASS INDEX: 20.84 KG/M2 | HEART RATE: 73 BPM | HEIGHT: 65 IN | RESPIRATION RATE: 16 BRPM | OXYGEN SATURATION: 99 % | SYSTOLIC BLOOD PRESSURE: 124 MMHG

## 2025-02-06 DIAGNOSIS — Z01.818 PREOP GENERAL PHYSICAL EXAM: Primary | ICD-10-CM

## 2025-02-06 DIAGNOSIS — H26.9 CATARACT OF LEFT EYE, UNSPECIFIED CATARACT TYPE: ICD-10-CM

## 2025-02-06 PROCEDURE — 99214 OFFICE O/P EST MOD 30 MIN: CPT | Mod: GC

## 2025-02-06 RX ORDER — NORTRIPTYLINE HYDROCHLORIDE 10 MG/1
10 CAPSULE ORAL AT BEDTIME
COMMUNITY
Start: 2025-01-06

## 2025-02-06 ASSESSMENT — PAIN SCALES - GENERAL: PAINLEVEL_OUTOF10: NO PAIN (0)

## 2025-02-06 NOTE — PROGRESS NOTES
Preoperative Evaluation  [facility] [place]  [address]  [address]  [place] [address]  Phone: [phone]  Fax: [phone]  Primary Provider: NANCY Perkins CNP  Pre-op Performing Provider: Jalyn Mendoza MD  Feb 6, 2025 2/6/2025   Surgical Information   What procedure is being done? cataract surgery of left eye    Facility or Hospital where procedure/surgery will be performed: [facility]    Who is doing the procedure / surgery? Dr Leanna Duarte    Date of surgery / procedure: 2/18/25    Time of surgery / procedure: time tbd    Where do you plan to recover after surgery? at home with family        Proxy-reported     Fax number for surgical facility: [phone]    Assessment & Plan     The proposed surgical procedure is considered LOW risk.    1. Preop general physical exam (Primary)  - Low risk procedure, no need to hold nortriptyline prior to surgery     2. Cataract of left eye, unspecified cataract type     - No identified additional risk factors other than previously addressed    Preoperative Medication Instructions  Antiplatelet or Anticoagulation Medication Instructions   - Patient is on no antiplatelet or anticoagulation medications.   - Bleeding risk is low for this procedure (e.g. dental, skin, cataract).    Additional Medication Instructions  Take all scheduled medications on the day of surgery    Recommendation  Approval given to proceed with proposed procedure, without further diagnostic evaluation.    Adrian Velarde is a 64 year old, presenting for the following:  Pre-Op Exam     will be dropping her off and picking her up from surgery.         2/6/2025     8:52 AM   Additional Questions   Roomed by Kizzy Martínez CMA         2/6/2025   Forms   Any forms needing to be completed Yes         2/6/2025   Pre-Op Questionnaire   Have you ever had a heart attack or stroke? No    Have you ever had surgery on your heart or  blood vessels, such as a stent placement, a coronary artery bypass, or surgery on an artery in your head, neck, heart, or legs? No    Do you have chest pain with activity? No    Do you have a history of heart failure? No    Do you currently have a cold, bronchitis or symptoms of other infection? No    Do you have a cough, shortness of breath, or wheezing? No    Do you or anyone in your family have previous history of blood clots? No    Do you or does anyone in your family have a serious bleeding problem such as prolonged bleeding following surgeries or cuts? No    Have you ever had problems with anemia or been told to take iron pills? No    Have you had any abnormal blood loss such as black, tarry or bloody stools, or abnormal vaginal bleeding? No    Have you ever had a blood transfusion? No    Are you willing to have a blood transfusion if it is medically needed before, during, or after your surgery? Yes    Have you or any of your relatives ever had problems with anesthesia? No    Do you have sleep apnea, excessive snoring or daytime drowsiness? No    Do you have any artifical heart valves or other implanted medical devices like a pacemaker, defibrillator, or continuous glucose monitor? No    Do you have artificial joints? No    Are you allergic to latex? No        Proxy-reported     Health Care Directive  Patient does not have a Health Care Directive: Discussed advance care planning with patient; however, patient declined at this time.    Preoperative Review of    reviewed - no record of controlled substances prescribed.        Patient Active Problem List    Diagnosis Date Noted    Osteopenia of both hips 05/07/2023     Priority: Medium    Bilateral occipital neuralgia 03/21/2022     Priority: Medium    PMB (postmenopausal bleeding) 09/28/2020     Priority: Medium    Cervicalgia 08/03/2016     Priority: Medium    Menopausal syndrome (hot flashes) 08/19/2011     Priority: Medium    Generalized hyperhidrosis  "07/17/2009     Priority: Medium    Other and unspecified malignant neoplasm of skin of other and unspecified parts of face 10/14/2004     Priority: Medium     IMO update changed this record. 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      CITRACAL/VITAMIN D PO 2 a day      estradiol (ESTRACE) 0.1 MG/GM vaginal cream PLACE 2 GRAMS VAGINALLY 3 TIMES A WEEK 42.5 g 1    nortriptyline (PAMELOR) 10 MG capsule Take 10 mg by mouth at bedtime.      omeprazole (PRILOSEC) 20 MG DR capsule Take 20 mg by mouth daily. (Patient not taking: Reported on 2/6/2025)         Allergies   Allergen Reactions    No Known Allergies         Social History     Tobacco Use    Smoking status: Never    Smokeless tobacco: Never   Substance Use Topics    Alcohol use: Yes     Alcohol/week: 10.0 standard drinks of alcohol     Comment: Rarely, a glass of prosecco.       History   Drug Use No               Objective    /84 (BP Location: Right arm, Cuff Size: Adult Regular)   Pulse 73   Temp 98.1  F (36.7  C) (Tympanic)   Resp 16   Ht 1.645 m (5' 4.75\")   Wt 56.7 kg (125 lb 1.6 oz)   LMP 06/20/2008 (Exact Date)   SpO2 99%   BMI 20.98 kg/m     Estimated body mass index is 20.98 kg/m  as calculated from the following:    Height as of this encounter: 1.645 m (5' 4.75\").    Weight as of this encounter: 56.7 kg (125 lb 1.6 oz).  Physical Exam  GENERAL: alert and no distress  EYES: Eyes grossly normal to inspection, PERRL and conjunctivae and sclerae normal  HENT: ear canals and TM's " normal, nose and mouth without ulcers or lesions  NECK: no adenopathy, no asymmetry, masses, or scars  RESP: lungs clear to auscultation - no rales, rhonchi or wheezes  CV: regular rate and rhythm, normal S1 S2, no S3 or S4, no murmur, click or rub, no peripheral edema  ABDOMEN: soft, nontender, no hepatosplenomegaly, no masses and bowel sounds normal  MS: no gross musculoskeletal defects noted, no edema  SKIN: no suspicious lesions or rashes  NEURO: Normal strength and tone, mentation intact and speech normal  PSYCH: mentation appears normal, affect normal/bright    Recent Labs   Lab Test 05/18/24  1324 04/02/24  1111   HGB 11.7  --      --    *  --    POTASSIUM 4.1  --    CR 0.71  --    A1C  --  5.8*        Diagnostics  No labs were ordered during this visit.   No EKG required for low risk surgery (cataract, skin procedure, breast biopsy, etc).    Revised Cardiac Risk Index (RCRI)  The patient has the following serious cardiovascular risks for perioperative complications:   - No serious cardiac risks = 0 points     RCRI Interpretation: 0 points: Class I (very low risk - 0.4% complication rate)      Signed Electronically by: Jalyn Mendoza MD  A copy of this evaluation report is provided to the requesting physician.

## 2025-02-06 NOTE — PATIENT INSTRUCTIONS
How to Take Your Medication Before Surgery  Preoperative Medication Instructions   Antiplatelet or Anticoagulation Medication Instructions   - Patient is on no antiplatelet or anticoagulation medications.   - Bleeding risk is low for this procedure (e.g. dental, skin, cataract).    Additional Medication Instructions  Take all scheduled medications on the day of surgery       Patient Education   Preparing for Your Surgery  For Adults  Getting started  In most cases, a nurse will call to review your health history and instructions. They will give you an arrival time based on your scheduled surgery time. Please be ready to share:  Your doctor's clinic name and phone number  Your medical, surgical, and anesthesia history  A list of allergies and sensitivities  A list of medicines, including herbal treatments and over-the-counter drugs  Whether the patient has a legal guardian (ask how to send us the papers in advance)  Note: You may not receive a call if you were seen at our PAC (Preoperative Assessment Center).  Please tell us if you're pregnant--or if there's any chance you might be pregnant. Some surgeries may injure a fetus (unborn baby), so they require a pregnancy test. Surgeries that are safe for a fetus don't always need a test, and you can choose whether to have one.   Preparing for surgery  Within 10 to 30 days of surgery: Have a pre-op exam (sometimes called an H&P, or History and Physical). This can be done at a clinic or pre-operative center.  If you're having a , you may not need this exam. Talk to your care team.  At your pre-op exam, talk to your care team about all medicines you take. (This includes CBD oil and any drugs, such as THC, marijuana, and other forms of cannabis.) If you need to stop any medicine before surgery, ask when to start taking it again.  This is for your safety. Many medicines and drugs can make you bleed too much during surgery. Some change how well surgery (anesthesia)  drugs work.  Call your insurance company to let them know you're having surgery. (If you don't have insurance, call [phone].)  Call your clinic if there's any change in your health. This includes a scrape or scratch near the surgery site, or any signs of a cold (sore throat, runny nose, cough, rash, fever).  Eating and drinking guidelines  For your safety: Unless your surgeon tells you otherwise, follow the guidelines below.  Eat and drink as normal until 8 hours before you arrive for surgery. After that, no food or milk. You can spit out gum when you arrive.  Drink clear liquids until 2 hours before you arrive. These are liquids you can see through, like water, Gatorade, and Propel Water. They also include plain black coffee and tea (no cream or milk).  No alcohol for 24 hours before you arrive. The night before surgery, stop any drinks that contain THC.  If your care team tells you to take medicine on the morning of surgery, it's okay to take it with a sip of water. No other medicines or drugs are allowed (including CBD oil)--follow your care team's instructions.  If you have questions the day of surgery, call your hospital or surgery center.   Preventing infection  Shower or bathe the night before and the morning of surgery. Follow the instructions your clinic gave you. (If no instructions, use regular soap.)  Don't shave or clip hair near your surgery site. We'll remove the hair if needed.  Don't smoke or vape the morning of surgery. No chewing tobacco for 6 hours before you arrive. A nicotine patch is okay. You may spit out nicotine gum when you arrive.  For some surgeries, the surgeon will tell you to fully quit smoking and nicotine.  We will make every effort to keep you safe from infection. We will:  Clean our hands often with soap and water (or an alcohol-based hand rub).  Clean the skin at your surgery site with a special soap that kills germs.  Give you a special gown to keep you warm. (Cold raises  the risk of infection.)  Wear hair covers, masks, gowns, and gloves during surgery.  Give antibiotic medicine, if prescribed. Not all surgeries need this medicine.  What to bring on the day of surgery  Photo ID and insurance card  Copy of your health care directive, if you have one  Glasses and hearing aids (bring cases)  You can't wear contacts during surgery  Inhaler and eye drops, if you use them (tell us about these when you arrive)  CPAP machine or breathing device, if you use them  A few personal items, if spending the night  If you have . . .  A pacemaker, ICD (cardiac defibrillator), or other implant: Bring the ID card.  An implanted stimulator: Bring the remote control.  A legal guardian: Bring a copy of the certified (court-stamped) guardianship papers.  Please remove any jewelry, including body piercings. Leave jewelry and other valuables at home.  If you're going home the day of surgery  You must have a responsible adult drive you home. They should stay with you overnight as well.  If you don't have someone to stay with you, and you aren't safe to go home alone, we may keep you overnight. Insurance often won't pay for this.  After surgery  If it's hard to control your pain or you need more pain medicine, please call your surgeon's office.  Questions?   If you have any questions for your care team, list them here:   ____________________________________________________________________________________________________________________________________________________________________________________________________________________________________________________________  For informational purposes only. Not to replace the advice of your health care provider. Copyright   2003, 2019 [facility]. All rights reserved. Clinically reviewed by Kavon Pardo MD. [employer] [id number] - REV 08/24.

## 2025-03-02 DIAGNOSIS — N95.2 POST-MENOPAUSAL ATROPHIC VAGINITIS: ICD-10-CM

## 2025-03-03 RX ORDER — ESTRADIOL 0.1 MG/G
CREAM VAGINAL
Qty: 42.5 G | Refills: 1 | Status: SHIPPED | OUTPATIENT
Start: 2025-03-03

## 2025-03-19 ENCOUNTER — TRANSFERRED RECORDS (OUTPATIENT)
Dept: HEALTH INFORMATION MANAGEMENT | Facility: CLINIC | Age: 65
End: 2025-03-19
Payer: COMMERCIAL

## 2025-05-05 ENCOUNTER — PATIENT OUTREACH (OUTPATIENT)
Dept: CARE COORDINATION | Facility: CLINIC | Age: 65
End: 2025-05-05
Payer: COMMERCIAL

## 2025-06-09 ENCOUNTER — ANCILLARY PROCEDURE (OUTPATIENT)
Dept: MAMMOGRAPHY | Facility: CLINIC | Age: 65
End: 2025-06-09
Attending: NURSE PRACTITIONER
Payer: COMMERCIAL

## 2025-06-09 DIAGNOSIS — Z12.31 VISIT FOR SCREENING MAMMOGRAM: ICD-10-CM

## 2025-06-09 PROCEDURE — 77063 BREAST TOMOSYNTHESIS BI: CPT | Mod: TC | Performed by: RADIOLOGY

## 2025-06-09 PROCEDURE — 77067 SCR MAMMO BI INCL CAD: CPT | Mod: TC | Performed by: RADIOLOGY

## 2025-07-30 ENCOUNTER — TELEPHONE (OUTPATIENT)
Dept: PEDIATRICS | Facility: CLINIC | Age: 65
End: 2025-07-30
Payer: COMMERCIAL

## (undated) DEVICE — KIT ENDO TURNOVER/PROCEDURE W/CLEAN A SCOPE LINERS 103888

## (undated) RX ORDER — FENTANYL CITRATE 50 UG/ML
INJECTION, SOLUTION INTRAMUSCULAR; INTRAVENOUS
Status: DISPENSED
Start: 2018-08-06